# Patient Record
Sex: MALE | Race: AMERICAN INDIAN OR ALASKA NATIVE | HISPANIC OR LATINO | Employment: UNEMPLOYED | URBAN - METROPOLITAN AREA
[De-identification: names, ages, dates, MRNs, and addresses within clinical notes are randomized per-mention and may not be internally consistent; named-entity substitution may affect disease eponyms.]

---

## 2017-01-19 ENCOUNTER — ALLSCRIPTS OFFICE VISIT (OUTPATIENT)
Dept: OTHER | Facility: OTHER | Age: 52
End: 2017-01-19

## 2017-01-19 DIAGNOSIS — G25.81 RESTLESS LEGS SYNDROME: ICD-10-CM

## 2017-01-19 DIAGNOSIS — E78.5 HYPERLIPIDEMIA: ICD-10-CM

## 2017-01-19 DIAGNOSIS — R07.89 OTHER CHEST PAIN: ICD-10-CM

## 2017-01-19 DIAGNOSIS — M54.9 DORSALGIA: ICD-10-CM

## 2017-01-30 ENCOUNTER — HOSPITAL ENCOUNTER (OUTPATIENT)
Dept: RADIOLOGY | Facility: HOSPITAL | Age: 52
Discharge: HOME | End: 2017-01-30
Payer: COMMERCIAL

## 2017-01-30 ENCOUNTER — APPOINTMENT (OUTPATIENT)
Dept: LAB | Facility: HOSPITAL | Age: 52
End: 2017-01-30
Payer: COMMERCIAL

## 2017-01-30 DIAGNOSIS — G25.81 RESTLESS LEGS SYNDROME: ICD-10-CM

## 2017-01-30 DIAGNOSIS — E78.5 HYPERLIPIDEMIA: ICD-10-CM

## 2017-01-30 DIAGNOSIS — M54.9 DORSALGIA: ICD-10-CM

## 2017-01-30 DIAGNOSIS — R07.89 OTHER CHEST PAIN: ICD-10-CM

## 2017-01-30 LAB
ALBUMIN SERPL BCP-MCNC: 3.6 G/DL (ref 3.5–5)
ALP SERPL-CCNC: 78 U/L (ref 46–116)
ALT SERPL W P-5'-P-CCNC: 38 U/L (ref 12–78)
ANION GAP SERPL CALCULATED.3IONS-SCNC: 9 MMOL/L (ref 4–13)
AST SERPL W P-5'-P-CCNC: 20 U/L (ref 5–45)
BACTERIA UR QL AUTO: ABNORMAL /HPF
BASOPHILS # BLD AUTO: 0.05 THOUSANDS/ΜL (ref 0–0.1)
BASOPHILS NFR BLD AUTO: 1 % (ref 0–1)
BILIRUB SERPL-MCNC: 0.29 MG/DL (ref 0.2–1)
BILIRUB UR QL STRIP: NEGATIVE
BUN SERPL-MCNC: 14 MG/DL (ref 5–25)
CALCIUM SERPL-MCNC: 8.8 MG/DL (ref 8.3–10.1)
CHLORIDE SERPL-SCNC: 106 MMOL/L (ref 100–108)
CHOLEST SERPL-MCNC: 213 MG/DL (ref 50–200)
CLARITY UR: CLEAR
CO2 SERPL-SCNC: 27 MMOL/L (ref 21–32)
COLOR UR: YELLOW
CREAT SERPL-MCNC: 1.01 MG/DL (ref 0.6–1.3)
EOSINOPHIL # BLD AUTO: 0.2 THOUSAND/ΜL (ref 0–0.61)
EOSINOPHIL NFR BLD AUTO: 3 % (ref 0–6)
ERYTHROCYTE [DISTWIDTH] IN BLOOD BY AUTOMATED COUNT: 14.8 % (ref 11.6–15.1)
FOLATE SERPL-MCNC: 10.6 NG/ML (ref 3.1–17.5)
GFR SERPL CREATININE-BSD FRML MDRD: >60 ML/MIN/1.73SQ M
GLUCOSE SERPL-MCNC: 99 MG/DL (ref 65–140)
GLUCOSE UR STRIP-MCNC: NEGATIVE MG/DL
HCT VFR BLD AUTO: 44.2 % (ref 36.5–49.3)
HDLC SERPL-MCNC: 42 MG/DL (ref 40–60)
HGB BLD-MCNC: 15.1 G/DL (ref 12–17)
HGB UR QL STRIP.AUTO: ABNORMAL
KETONES UR STRIP-MCNC: NEGATIVE MG/DL
LDLC SERPL CALC-MCNC: 149 MG/DL (ref 0–100)
LEUKOCYTE ESTERASE UR QL STRIP: NEGATIVE
LYMPHOCYTES # BLD AUTO: 2.09 THOUSANDS/ΜL (ref 0.6–4.47)
LYMPHOCYTES NFR BLD AUTO: 29 % (ref 14–44)
MCH RBC QN AUTO: 31.5 PG (ref 26.8–34.3)
MCHC RBC AUTO-ENTMCNC: 34.2 G/DL (ref 31.4–37.4)
MCV RBC AUTO: 92 FL (ref 82–98)
MONOCYTES # BLD AUTO: 0.53 THOUSAND/ΜL (ref 0.17–1.22)
MONOCYTES NFR BLD AUTO: 7 % (ref 4–12)
NEUTROPHILS # BLD AUTO: 4.46 THOUSANDS/ΜL (ref 1.85–7.62)
NEUTS SEG NFR BLD AUTO: 60 % (ref 43–75)
NITRITE UR QL STRIP: NEGATIVE
NON-SQ EPI CELLS URNS QL MICRO: ABNORMAL /HPF
PH UR STRIP.AUTO: 5.5 [PH] (ref 4.5–8)
PLATELET # BLD AUTO: 202 THOUSANDS/UL (ref 149–390)
PMV BLD AUTO: 11 FL (ref 8.9–12.7)
POTASSIUM SERPL-SCNC: 4.1 MMOL/L (ref 3.5–5.3)
PROT SERPL-MCNC: 7.3 G/DL (ref 6.4–8.2)
PROT UR STRIP-MCNC: NEGATIVE MG/DL
RBC # BLD AUTO: 4.79 MILLION/UL (ref 3.88–5.62)
RBC #/AREA URNS AUTO: ABNORMAL /HPF
SODIUM SERPL-SCNC: 142 MMOL/L (ref 136–145)
SP GR UR STRIP.AUTO: 1.02 (ref 1–1.03)
TRIGL SERPL-MCNC: 108 MG/DL
TSH SERPL DL<=0.05 MIU/L-ACNC: 2.96 UIU/ML (ref 0.36–3.74)
UROBILINOGEN UR QL STRIP.AUTO: 0.2 E.U./DL
VIT B12 SERPL-MCNC: 205 PG/ML (ref 100–900)
WBC # BLD AUTO: 7.33 THOUSAND/UL (ref 4.31–10.16)
WBC #/AREA URNS AUTO: ABNORMAL /HPF

## 2017-01-30 PROCEDURE — 36415 COLL VENOUS BLD VENIPUNCTURE: CPT

## 2017-01-30 PROCEDURE — 82607 VITAMIN B-12: CPT

## 2017-01-30 PROCEDURE — 72110 X-RAY EXAM L-2 SPINE 4/>VWS: CPT

## 2017-01-30 PROCEDURE — 84443 ASSAY THYROID STIM HORMONE: CPT

## 2017-01-30 PROCEDURE — 82746 ASSAY OF FOLIC ACID SERUM: CPT

## 2017-01-30 PROCEDURE — 80053 COMPREHEN METABOLIC PANEL: CPT

## 2017-01-30 PROCEDURE — 87086 URINE CULTURE/COLONY COUNT: CPT

## 2017-01-30 PROCEDURE — 80061 LIPID PANEL: CPT

## 2017-01-30 PROCEDURE — 81001 URINALYSIS AUTO W/SCOPE: CPT

## 2017-01-30 PROCEDURE — 85025 COMPLETE CBC W/AUTO DIFF WBC: CPT

## 2017-01-31 ENCOUNTER — GENERIC CONVERSION - ENCOUNTER (OUTPATIENT)
Dept: OTHER | Facility: OTHER | Age: 52
End: 2017-01-31

## 2017-01-31 LAB — BACTERIA UR CULT: NORMAL

## 2017-02-01 ENCOUNTER — GENERIC CONVERSION - ENCOUNTER (OUTPATIENT)
Dept: OTHER | Facility: OTHER | Age: 52
End: 2017-02-01

## 2017-02-14 ENCOUNTER — HOSPITAL ENCOUNTER (OUTPATIENT)
Dept: NON INVASIVE DIAGNOSTICS | Facility: HOSPITAL | Age: 52
Discharge: HOME | End: 2017-02-14
Payer: COMMERCIAL

## 2017-02-14 DIAGNOSIS — R07.89 OTHER CHEST PAIN: ICD-10-CM

## 2017-02-14 PROCEDURE — 93017 CV STRESS TEST TRACING ONLY: CPT

## 2017-02-21 ENCOUNTER — GENERIC CONVERSION - ENCOUNTER (OUTPATIENT)
Dept: OTHER | Facility: OTHER | Age: 52
End: 2017-02-21

## 2017-05-11 ENCOUNTER — ALLSCRIPTS OFFICE VISIT (OUTPATIENT)
Dept: OTHER | Facility: OTHER | Age: 52
End: 2017-05-11

## 2017-05-11 DIAGNOSIS — N52.9 MALE ERECTILE DYSFUNCTION: ICD-10-CM

## 2018-01-10 NOTE — RESULT NOTES
Verified Results  (1) URINALYSIS w URINE C/S REFLEX (will reflex a microscopy if leukocytes, occult blood, or nitrites are not within normal limits) 37HBQ4356 10:08AM Aristides Moya Order Number: [id number]     Test Name Result Flag Reference   COLOR Yellow     CLARITY Clear     PH UA 5 5  4 5-8 0   LEUKOCYTE ESTERASE UA Negative  Negative   NITRITE UA Negative  Negative   PROTEIN UA Negative mg/dl  Negative   GLUCOSE UA Negative mg/dl  Negative   KETONES UA Negative mg/dl  Negative   UROBILINOGEN UA 0 2 E U /dl  0 2, 1 0 E U /dl   BILIRUBIN UA Negative  Negative   BLOOD UA Small A Negative, Trace-Intact   SPECIFIC GRAVITY UA 1 025  1 003-1 030   BACTERIA Occasional /hpf  None Seen, Occasional   EPITHELIAL CELLS Occasional /hpf  None Seen, Occasional   RBC UA 0-1 /hpf A None Seen   WBC UA 0-1 /hpf A None Seen   CLINICAL REPORT (Report)     Test:        Urine culture  Specimen Type:   Urine  Specimen Date:   1/30/2017 10:08 AM  Result Date:    1/31/2017 9:33 AM  Result Status:   Final result  Resulting Lab:    [facility]            Tel: [phone]      CULTURE                                       ------------------                                   No Growth <1000 cfu/mL

## 2018-01-12 VITALS
TEMPERATURE: 97 F | WEIGHT: 183 LBS | SYSTOLIC BLOOD PRESSURE: 108 MMHG | HEIGHT: 68 IN | HEART RATE: 90 BPM | OXYGEN SATURATION: 98 % | RESPIRATION RATE: 20 BRPM | DIASTOLIC BLOOD PRESSURE: 60 MMHG | BODY MASS INDEX: 27.74 KG/M2

## 2018-01-12 NOTE — RESULT NOTES
Verified Results  (1) CBC/PLT/DIFF 52XSF7791 10:08AM Lizabeth Santana Order Number: [id number]     Test Name Result Flag Reference   WBC COUNT 7 33 Thousand/uL  4 31-10 16   RBC COUNT 4 79 Million/uL  3 88-5 62   HEMOGLOBIN 15 1 g/dL  12 0-17 0   HEMATOCRIT 44 2 %  36 5-49 3   MCV 92 fL  82-98   MCH 31 5 pg  26 8-34 3   MCHC 34 2 g/dL  31 4-37 4   RDW 14 8 %  11 6-15 1   MPV 11 0 fL  8 9-12 7   PLATELET COUNT 590 Thousands/uL  149-390   NEUTROPHILS RELATIVE PERCENT 60 %  43-75   LYMPHOCYTES RELATIVE PERCENT 29 %  14-44   MONOCYTES RELATIVE PERCENT 7 %  4-12   EOSINOPHILS RELATIVE PERCENT 3 %  0-6   BASOPHILS RELATIVE PERCENT 1 %  0-1   NEUTROPHILS ABSOLUTE COUNT 4 46 Thousands/?L  1 85-7 62   LYMPHOCYTES ABSOLUTE COUNT 2 09 Thousands/?L  0 60-4 47   MONOCYTES ABSOLUTE COUNT 0 53 Thousand/?L  0 17-1 22   EOSINOPHILS ABSOLUTE COUNT 0 20 Thousand/?L  0 00-0 61   BASOPHILS ABSOLUTE COUNT 0 05 Thousands/?L  0 00-0 10   - Patient Instructions: This bloodwork is non-fasting  Please drink two glasses of water morning of bloodwork  - Patient Instructions: This bloodwork is non-fasting  Please drink two glasses of water morning of bloodwork  (1) COMPREHENSIVE METABOLIC PANEL 59OFO2641 06:96XI Lizabeth Santana Order Number: [id number]     Test Name Result Flag Reference   GLUCOSE,RANDM 99 mg/dL     If the patient is fasting, the ADA then defines impaired fasting glucose as > 100 mg/dL and diabetes as > or equal to 123 mg/dL     SODIUM 142 mmol/L  136-145   POTASSIUM 4 1 mmol/L  3 5-5 3   CHLORIDE 106 mmol/L  100-108   CARBON DIOXIDE 27 mmol/L  21-32   ANION GAP (CALC) 9 mmol/L  4-13   BLOOD UREA NITROGEN 14 mg/dL  5-25   CREATININE 1 01 mg/dL  0 60-1 30   Standardized to IDMS reference method   CALCIUM 8 8 mg/dL  8 3-10 1   BILI, TOTAL 0 29 mg/dL  0 20-1 00   ALK PHOSPHATAS 78 U/L     ALT (SGPT) 38 U/L  12-78   AST(SGOT) 20 U/L  5-45   ALBUMIN 3 6 g/dL  3 5-5 0   TOTAL PROTEIN 7 3 g/dL  6 4-8 2   eGFR Non-African American      >60 0 ml/min/1 73sq m   - Patient Instructions: This is a fasting blood test  Water,black tea or black  coffee only after 9:00pm the night before test Drink 2 glasses of water the morning of test   National Kidney Disease Education Program recommendations are as follows:  GFR calculation is accurate only with a steady state creatinine  Chronic Kidney disease less than 60 ml/min/1 73 sq  meters  Kidney failure less than 15 ml/min/1 73 sq  meters  (1) LIPID PANEL, FASTING 30Jan2017 10:08AM Catrachito Aguilar Order Number: [id number]     Test Name Result Flag Reference   CHOLESTEROL 213 mg/dL H    HDL,DIRECT 42 mg/dL  40-60   Specimen collection should occur prior to Metamizole administration due to the potential for falsely depressed results  LDL CHOLESTEROL CALCULATED 149 mg/dL H 0-100   - Patient Instructions: This is a fasting blood test  Water,black tea or black  coffee only after 9:00pm the night before test   Drink 2 glasses of water the morning of test     - Patient Instructions: This is a fasting blood test  Water,black tea or black  coffee only after 9:00pm the night before test Drink 2 glasses of water the morning of test   Triglyceride:         Normal              <150 mg/dl       Borderline High    150-199 mg/dl       High               200-499 mg/dl       Very High          >499 mg/dl  Cholesterol:         Desirable        <200 mg/dl      Borderline High  200-239 mg/dl      High             >239 mg/dl  HDL Cholesterol:        High    >59 mg/dL      Low     <41 mg/dL  LDL CALCULATED:    This screening LDL is a calculated result  It does not have the accuracy of the Direct Measured LDL in the monitoring of patients with hyperlipidemia and/or statin therapy  Direct Measure LDL (JHN650) must be ordered separately in these patients     TRIGLYCERIDES 108 mg/dL  <=150   Specimen collection should occur prior to N-Acetylcysteine or Metamizole administration due to the potential for falsely depressed results  (1) TSH WITH FT4 REFLEX 30Jan2017 10:08 [employer] Order Number: [id number]     Test Name Result Flag Reference   TSH 2 963 uIU/mL  0 358-3 740   - Patient Instructions: This is a fasting blood test  Water,black tea or black  coffee only after 9:00pm the night before test Drink 2 glasses of water the morning of test   Patients undergoing fluorescein dye angiography may retain small amounts of fluorescein in the body for 48-72 hours post procedure  Samples containing fluorescein can produce falsely depressed TSH values  If the patient had this procedure,a specimen should be resubmitted post fluorescein clearance       (1) URINALYSIS w URINE C/S REFLEX (will reflex a microscopy if leukocytes, occult blood, or nitrites are not within normal limits) [id number] 10:08 [employer] Order Number: [id number]     Test Name Result Flag Reference   COLOR Yellow     CLARITY Clear     PH UA 5 5  4 5-8 0   LEUKOCYTE ESTERASE UA Negative  Negative   NITRITE UA Negative  Negative   PROTEIN UA Negative mg/dl  Negative   GLUCOSE UA Negative mg/dl  Negative   KETONES UA Negative mg/dl  Negative   UROBILINOGEN UA 0 2 E U /dl  0 2, 1 0 E U /dl   BILIRUBIN UA Negative  Negative   BLOOD UA Small A Negative, Trace-Intact   SPECIFIC GRAVITY UA 1 025  1 003-1 030   BACTERIA Occasional /hpf  None Seen, Occasional   EPITHELIAL CELLS Occasional /hpf  None Seen, Occasional   RBC UA 0-1 /hpf A None Seen   WBC UA 0-1 /hpf A None Seen     (1) VITAMIN B12 30Jan2017 10:08 [employer] Order Number: [id number]     Test Name Result Flag Reference   VITAMIN B12 205 pg/mL  100-900     (1) FOLATE 19OMI3417 10:08 [employer] Order Number: [id number]     Test Name Result Flag Reference   FOLATE 10 6 ng/mL  3 1-17 5       Plan  Hyperlipidemia    · Atorvastatin Calcium 20 MG Oral Tablet; TAKE 1 TABLET DAILY AS DIRECTED

## 2018-01-12 NOTE — RESULT NOTES
Verified Results  STRESS TEST ONLY, EXERCISE 40SZY6418 08:43AM Wilbur Carlson Order Number: [id number]    - Patient Instructions: To schedule this appointment, please contact Central Scheduling at [phone]  Test Name Result Flag Reference   STRESS TEST ONLY, EXERCISE (Report)     Bony [address]fall [address]  [facility], [address]   [phone]     Stress Electrocardiography during Exercise     Name: Latasha Loja   MR #: [id number]   Account #: [de-identified]   Study date: 2017   : 1965   Age: 49 years   Gender: Male   Height: 68 in   Weight: 183 lb   BSA: 1 97 m squared     Allergies: NO KNOWN ALLERGIES     Diagnosis: R07 9 - Chest pain, unspecified     Primary Physician: Margie Irving PA-C   Referring Physician: Margie Irving PA-C   RN: Vasile Charles RN BSN   Technician: Ranjan Rich   GROUP: [facility]   Interpreting Physician: Ashley Thomason MD PhD   Report Prepared By[de-identified] Ranjan Rich     CLINICAL QUESTION: Detection of coronary artery disease  HISTORY: back pain The patient is a 49year old  male  Chest pain status: chest pain  Other symptoms: dyspnea  Coronary artery disease risk factors: dyslipidemia and family history of premature coronary artery disease  Cardiovascular history: none significant  Medications: a lipid lowering agent  Previous test results: abnormal ECG  PHYSICAL EXAM: Baseline physical exam screening: normal      REST ECG: Normal sinus rhythm w/ RBBB     PROCEDURE: Treadmill exercise testing was performed, using the Hortensia protocol  Stress electrocardiographic evaluation was performed       HORTENSIA PROTOCOL:   HR bpm SBP mmHg DBP mmHg Symptoms   Baseline 94 114 26 none   Stage 1 112 142 76 none   Stage 2 129 149 45 none   Stage 3 144 183 56 none   Stage 4 151 180 70 --   Recovery 1 123 161 68 subsiding   Recovery 2 106 116 68 none     STRESS SUMMARY: STOPPED DUE TO BACK PAIN Duration of exercise was 9 min and 30 sec  The patient exercised to protocol stage 4  Maximal work rate was 10 9 METs  Maximal heart rate during stress was 153 bpm ( 90 % of maximal predicted heart   rate)  The rate-pressure product for the peak heart rate and blood pressure was 36578  There was no chest pain during stress  The stress test was terminated due to achievement of target heart rate and fatigue  The stress ECG was equivocal   for ischemia  Arrhythmia during stress: isolated premature ventricular beats  The specificity of this test was limited by resting ECG abnormalities  SUMMARY:   - Stress results: Duration of exercise was 9 min and 30 sec  Target heart rate was achieved  There was no chest pain during stress  - ECG conclusions: The stress ECG was equivocal for ischemia  The specificity of this test was limited by resting ECG abnormalities  IMPRESSIONS: RESTING SAT 98% PEAK STRESS SAT 96% Equivocal study after maximal exercise with reproduction of symptoms pf BARRIENTOS but not chest tightness       Prepared and signed by     Martita Freitas MD PhD   Signed 02/14/2017 18:17:22

## 2018-01-13 VITALS
SYSTOLIC BLOOD PRESSURE: 126 MMHG | RESPIRATION RATE: 20 BRPM | WEIGHT: 180.13 LBS | OXYGEN SATURATION: 99 % | TEMPERATURE: 97.1 F | DIASTOLIC BLOOD PRESSURE: 70 MMHG | HEIGHT: 68 IN | BODY MASS INDEX: 27.3 KG/M2 | HEART RATE: 93 BPM

## 2018-01-16 NOTE — PROGRESS NOTES
Assessment    1  Erectile dysfunction (607 84) (N52 9)   2  Encounter for preventive health examination (V70 0) (Z00 00)   3  's permit PE (physical examination) (V70 3) (Z02 4)   4  Current every day smoker (305 1) (F17 200)    Plan  Colon cancer screening    · 2 - Shelby Angeles MD (Gastroenterology) Co-Management  *  Status: Hold For -  Scheduling  Requested for: 18FQB4780  Care Summary provided  : Yes   · COLONOSCOPY; Status:Active; Requested for:11May2017;   Erectile dysfunction    · (1) TESTOSTERONE, FREE (DIRECT) AND TOTAL; Status:Active; Requested  for:11May2017; Health Maintenance    · *VB-Depression Screening; Status:Complete;   Done: 39TYA9058 08:49AM  SocHx: Current every day smoker    · Begin a limited exercise program ; Status:Complete;   Done: 71PVL4655   · Decreasing the stress in your life may help your condition improve ; Status:Complete;    Done: 42SEV8428   · Limit your use of alcohol to 2 drinks or cans of beer a day ; Status:Complete;   Done:  82ZHG4348   · Regular aerobic exercise can help reduce stress ; Status:Complete;   Done: 61PMA0660   · There are many exercise options for seniors ; Status:Complete;   Done: 37QCS2189   · We recommend you quit smoking  Time spent counseling today was greater than 3  minutes ; Status:Complete;   Done: 88JED5674   · You need to quit smoking ; Status:Complete;   Done: 89AEZ7360   · You need to stop smoking  Though it is not easy, more than half of all adult smokers  have quit  We encourage you to write down all the reasons you should quit smoking and  set a quit date for yourself  Ask us how we can help  You may also call  3800-QUIT-NOW for free resources and assistance ; Status:Complete;   Done:  81MIF9915    Discussion/Summary  Impression: health maintenance visit  Currently, he eats an adequate diet and has an inadequate exercise regimen   Prostate cancer screening: the risks and benefits of prostate cancer screening were discussed and prostate cancer screening is current  Testicular cancer screening: the risks and benefits of testicular cancer screening were discussed and testicular cancer screening is current  Colorectal cancer screening: colonoscopy has been ordered  The immunizations are up to date  He was advised to be evaluated by an optometrist and a dentist  Advice and education were given regarding nutrition, aerobic exercise, tobacco cessation and seat belt use  Discussed diet and exercise  This will help lower cholesterol  May also help with ED symptoms  Smoking cessation will also help  Patient will try to cut back on his own at this time  Does not want assistance  Will get testosterone lab work  Discussed 's safety  Filled out physical form  Follow up in 1 year for physical    Possible side effects of new medications were reviewed with the patient/guardian today  The treatment plan was reviewed with the patient/guardian  The patient/guardian understands and agrees with the treatment plan     Self Referrals: No      Chief Complaint   License Physical      History of Present Illness  HM, Adult Male: The patient is being seen for a health maintenance evaluation  The last health maintenance visit was 1 year(s) ago  General Health: The patient's health since the last visit is described as good  He does not have regular dental visits  He denies vision problems  Immunizations status: up to date  Lifestyle:  He consumes a diverse and healthy diet  He does not exercise regularly  He uses tobacco  He denies alcohol use  He denies drug use  Reproductive health:  the patient is sexually active  birth control is not being practiced  He complains of erectile dysfunction  He is interested in treatment for erectile dysfunction  Screening:   metabolic screening reviewed and current  HPI: 46 y/o M presenting for yearly physical and 's permit physical form   Patient states that muscle relaxer has been helping with the RLS  Patient does have concerns with ED  Patient has a history of drug and ETOH abuse, been clean for 12 years, is a current smoker and has elevated cholesterol  Patient has not had colonoscopy completed and would like to have one done  Review of Systems    Constitutional: No fever or chills, feels well, no tiredness, no recent weight gain or weight loss  Eyes: No complaints of eye pain, no red eyes, no discharge from eyes, no itchy eyes  ENT: no complaints of earache, no hearing loss, no nosebleeds, no nasal discharge, no sore throat, no hoarseness  Cardiovascular: No complaints of slow heart rate, no fast heart rate, no chest pain, no palpitations, no leg claudication, no lower extremity  Respiratory: No complaints of shortness of breath, no wheezing, no cough, no SOB on exertion, no orthopnea or PND  Gastrointestinal: No complaints of abdominal pain, no constipation, no nausea or vomiting, no diarrhea or bloody stools  Genitourinary: No complaints of dysuria, no incontinence, no hesitancy, no nocturia, no genital lesion, no testicular pain  Musculoskeletal: No complaints of arthralgia, no myalgias, no joint swelling or stiffness, no limb pain or swelling  Integumentary: No complaints of skin rash or skin lesions, no itching, no skin wound, no dry skin  Neurological: No compliants of headache, no confusion, no convulsions, no numbness or tingling, no dizziness or fainting, no limb weakness, no difficulty walking  Psychiatric: Is not suicidal, no sleep disturbances, no anxiety or depression, no change in personality, no emotional problems  Endocrine: erectile dysfunction  Hematologic/Lymphatic: No complaints of swollen glands, no swollen glands in the neck, does not bleed easily, no easy bruising  ROS reviewed  Active Problems    1  Back pain (724 5) (M54 9)   2  Chest tightness (786 59) (R07 89)   3  Colon cancer screening (V76 51) (Z12 11)   4   Hyperlipidemia (272 4) (E78 5)   5  Restless leg syndrome (333 94) (G25 81)    Past Medical History    · History of drug abuse (305 93) (Z87 898)   · Denied: History of mental disorder    Family History  Mother    · Family history of malignant neoplasm of stomach (V16 0) (Z80 0)  Father    · Family history of alcoholism (V17 0) (Z81 1)   · Family history of Hypertelorism  Paternal [de-identified] Sister    · Family history of Diabetes mellitus of other type with proliferative retinopathy   · History of heart surgery  Family History    · Family history of alcoholism (V17 0) (Z81 1)   · Denied: No family history of mental disorder   · Denied: Family history of Non-abuse drug dependence    Social History    · Current every day smoker (305 1) (F17 200)   · No Taoism beliefs   · Primary language is Wolof   · Social alcohol use (Z78 9)    Current Meds   1  Atorvastatin Calcium 20 MG Oral Tablet; TAKE 1 TABLET DAILY AS DIRECTED; Therapy: 96VRB3763 to (Evaluate:01Cia5419)  Requested for: 40ADB9131; Last   Rx:31Jan2017 Ordered   2  Gabapentin 300 MG Oral Capsule; TAKE 1 CAPSULE AT BEDTIME; Therapy: 78BXQ0178 to (Evaluate:00Oed1994)  Requested for: 18OAE3436; Last   Rx:19Jan2017 Ordered   3  Gemfibrozil 600 MG Oral Tablet; TAKE 1 TABLET DAILY; Therapy: 79HKI2923 to (Evaluate:29Wta9303)  Requested for: 27GDA2986; Last   Rx:08Pwv7171 Ordered   4  Methocarbamol 500 MG Oral Tablet; TAKE 1 TABLET 3 TIMES DAILY; Therapy: 00UKE6339 to (Evaluate:85Zkk8124)  Requested for: 91ZQD8632; Last   Rx:19Jan2017 Ordered   5  Naproxen 500 MG Oral Tablet; take 1 tablet every 12 hours with food as needed; Therapy: 76WUK1341 to (Evaluate:80Riq5525)  Requested for: 32OYW1458; Last   Rx:19Jan2017 Ordered    Allergies    1   No Known Drug Allergies    Vitals   Recorded: 08ITN2651 08:44AM   Temperature 97 1 F, Tympanic   Heart Rate 93   Respiration 20   Systolic 596   Diastolic 70   Height 5 ft 8 in   Weight 180 lb 2 oz   BMI Calculated 27 39   BSA Calculated 1 95   O2 Saturation 99     Physical Exam    Constitutional   General appearance: No acute distress, well appearing and well nourished  Head and Face   Head and face: Normal     Palpation of the face and sinuses: No sinus tenderness  Eyes   Conjunctiva and lids: No erythema, swelling or discharge  Pupils and irises: Equal, round, reactive to light  Ears, Nose, Mouth, and Throat   External inspection of ears and nose: Normal     Otoscopic examination: Tympanic membranes translucent with normal light reflex  Canals patent without erythema  Hearing: Normal     Nasal mucosa, septum, and turbinates: Normal without edema or erythema  Lips, teeth, and gums: Normal, good dentition  Oropharynx: Normal with no erythema, edema, exudate or lesions  Neck   Neck: Supple, symmetric, trachea midline, no masses  Thyroid: Normal, no thyromegaly  Pulmonary   Respiratory effort: No increased work of breathing or signs of respiratory distress  Auscultation of lungs: Clear to auscultation  Cardiovascular   Palpation of heart: Normal PMI, no thrills  Auscultation of heart: Normal rate and rhythm, normal S1 and S2, no murmurs  Carotid pulses: 2+ bilaterally  Peripheral vascular exam: Normal     Examination of extremities for edema and/or varicosities: Normal     Abdomen   Abdomen: Non-tender, no masses  Liver and spleen: No hepatomegaly or splenomegaly  Lymphatic   Palpation of lymph nodes in neck: No lymphadenopathy  Musculoskeletal   Gait and station: Normal     Inspection/palpation of digits and nails: Normal without clubbing or cyanosis  Inspection/palpation of joints, bones, and muscles: Normal     Range of motion: Normal     Stability: Normal     Muscle strength/tone: Normal     Skin   Skin and subcutaneous tissue: Normal without rashes or lesions  Palpation of skin and subcutaneous tissue: Normal turgor  Neurologic   Cranial nerves: Cranial nerves 2-12 intact      Cortical function: Normal mental status  Reflexes: 2+ and symmetric  Sensation: No sensory loss  Coordination: Normal finger to nose and heel to shin  Psychiatric   Judgment and insight: Normal     Orientation to person, place and time: Normal     Mood and affect: Normal        Results/Data  *VB-Depression Screening 63OFT3492 08:49AM Dot Perera     Test Name Result Flag Reference   Depression Scale Result      Depression Screen - Negative For Symptoms     PHQ-2 Adult Depression Screening 12YYM2519 08:49AM User, s     Test Name Result Flag Reference   PHQ-2 Adult Depression Score 0     Over the last two weeks, how often have you been bothered by any of the following problems?   Little interest or pleasure in doing things: Not at all - 0  Feeling down, depressed, or hopeless: Not at all - 0   PHQ-2 Adult Depression Screening Negative         Signatures   Electronically signed by : DYLAN Francisco; May 11 2017 11:30AM EST                       (Author)    Electronically signed by : FLORIDA Franklin ; May 11 2017  1:30PM EST

## 2018-01-16 NOTE — RESULT NOTES
Verified Results  * XR SPINE LUMBAR MINIMUM 4 VIEWS NON INJURY 30Jan2017 09:47AM Madie Medeiros Order Number: [id number]     Test Name Result Flag Reference   XR SPINE LUMBAR MINIMUM 4 VIEWS (Report)     LUMBAR SPINE     INDICATION: Low back pain for 3 to 4 days, worse with twisting the torso  COMPARISON: None     VIEWS: AP, lateral, bilateral oblique and coned down projections; 6 images     FINDINGS:     Alignment is normal  There is subtle rightward curvature of lumbar spine with apex at L4  Curvature is only about 2 degrees  There is no radiographic evidence of acute fracture or destructive osseous lesion  No significant lumbar degenerative change noted  Visualized soft tissues appear unremarkable         IMPRESSION:     Unremarkable study       Workstation performed: [id number]     Signed by:   Ga Patton MD   1/31/17

## 2018-01-24 ENCOUNTER — APPOINTMENT (EMERGENCY)
Dept: CT IMAGING | Facility: HOSPITAL | Age: 53
End: 2018-01-24
Payer: COMMERCIAL

## 2018-01-24 ENCOUNTER — APPOINTMENT (EMERGENCY)
Dept: RADIOLOGY | Facility: HOSPITAL | Age: 53
End: 2018-01-24
Payer: COMMERCIAL

## 2018-01-24 ENCOUNTER — HOSPITAL ENCOUNTER (EMERGENCY)
Facility: HOSPITAL | Age: 53
Discharge: HOME | End: 2018-01-24
Attending: EMERGENCY MEDICINE | Admitting: EMERGENCY MEDICINE
Payer: COMMERCIAL

## 2018-01-24 VITALS
TEMPERATURE: 98 F | WEIGHT: 175 LBS | OXYGEN SATURATION: 98 % | DIASTOLIC BLOOD PRESSURE: 58 MMHG | SYSTOLIC BLOOD PRESSURE: 123 MMHG | BODY MASS INDEX: 26.61 KG/M2 | RESPIRATION RATE: 16 BRPM | HEART RATE: 61 BPM

## 2018-01-24 DIAGNOSIS — R51.9 HEADACHE: Primary | ICD-10-CM

## 2018-01-24 DIAGNOSIS — R06.00 DYSPNEA: ICD-10-CM

## 2018-01-24 LAB
ANION GAP SERPL CALCULATED.3IONS-SCNC: 10 MMOL/L (ref 4–13)
ATRIAL RATE: 66 BPM
BASOPHILS # BLD AUTO: 0.04 THOUSANDS/ΜL (ref 0–0.1)
BASOPHILS NFR BLD AUTO: 1 % (ref 0–1)
BUN SERPL-MCNC: 15 MG/DL (ref 5–25)
CALCIUM SERPL-MCNC: 8 MG/DL (ref 8.3–10.1)
CHLORIDE SERPL-SCNC: 108 MMOL/L (ref 100–108)
CO2 SERPL-SCNC: 25 MMOL/L (ref 21–32)
CREAT SERPL-MCNC: 1.15 MG/DL (ref 0.6–1.3)
EOSINOPHIL # BLD AUTO: 0.22 THOUSAND/ΜL (ref 0–0.61)
EOSINOPHIL NFR BLD AUTO: 3 % (ref 0–6)
ERYTHROCYTE [DISTWIDTH] IN BLOOD BY AUTOMATED COUNT: 14.5 % (ref 11.6–15.1)
GFR SERPL CREATININE-BSD FRML MDRD: 73 ML/MIN/1.73SQ M
GLUCOSE SERPL-MCNC: 117 MG/DL (ref 65–140)
HCT VFR BLD AUTO: 44.7 % (ref 36.5–49.3)
HGB BLD-MCNC: 15.1 G/DL (ref 12–17)
LYMPHOCYTES # BLD AUTO: 1.74 THOUSANDS/ΜL (ref 0.6–4.47)
LYMPHOCYTES NFR BLD AUTO: 27 % (ref 14–44)
MCH RBC QN AUTO: 32.8 PG (ref 26.8–34.3)
MCHC RBC AUTO-ENTMCNC: 33.8 G/DL (ref 31.4–37.4)
MCV RBC AUTO: 97 FL (ref 82–98)
MONOCYTES # BLD AUTO: 0.37 THOUSAND/ΜL (ref 0.17–1.22)
MONOCYTES NFR BLD AUTO: 6 % (ref 4–12)
NEUTROPHILS # BLD AUTO: 4.18 THOUSANDS/ΜL (ref 1.85–7.62)
NEUTS SEG NFR BLD AUTO: 64 % (ref 43–75)
NT-PROBNP SERPL-MCNC: 9 PG/ML
P AXIS: 46 DEGREES
PLATELET # BLD AUTO: 174 THOUSANDS/UL (ref 149–390)
PMV BLD AUTO: 11.2 FL (ref 8.9–12.7)
POTASSIUM SERPL-SCNC: 4 MMOL/L (ref 3.5–5.3)
PR INTERVAL: 152 MS
QRS AXIS: -80 DEGREES
QRSD INTERVAL: 156 MS
QT INTERVAL: 420 MS
QTC INTERVAL: 440 MS
RBC # BLD AUTO: 4.6 MILLION/UL (ref 3.88–5.62)
SODIUM SERPL-SCNC: 143 MMOL/L (ref 136–145)
T WAVE AXIS: 27 DEGREES
TROPONIN I SERPL-MCNC: <0.02 NG/ML
VENTRICULAR RATE: 66 BPM
WBC # BLD AUTO: 6.55 THOUSAND/UL (ref 4.31–10.16)

## 2018-01-24 PROCEDURE — 99284 EMERGENCY DEPT VISIT MOD MDM: CPT

## 2018-01-24 PROCEDURE — 71046 X-RAY EXAM CHEST 2 VIEWS: CPT

## 2018-01-24 PROCEDURE — 84484 ASSAY OF TROPONIN QUANT: CPT | Performed by: EMERGENCY MEDICINE

## 2018-01-24 PROCEDURE — 93010 ELECTROCARDIOGRAM REPORT: CPT | Performed by: INTERNAL MEDICINE

## 2018-01-24 PROCEDURE — 36415 COLL VENOUS BLD VENIPUNCTURE: CPT | Performed by: EMERGENCY MEDICINE

## 2018-01-24 PROCEDURE — 70450 CT HEAD/BRAIN W/O DYE: CPT

## 2018-01-24 PROCEDURE — 85025 COMPLETE CBC W/AUTO DIFF WBC: CPT | Performed by: EMERGENCY MEDICINE

## 2018-01-24 PROCEDURE — 80048 BASIC METABOLIC PNL TOTAL CA: CPT | Performed by: EMERGENCY MEDICINE

## 2018-01-24 PROCEDURE — 83880 ASSAY OF NATRIURETIC PEPTIDE: CPT | Performed by: EMERGENCY MEDICINE

## 2018-01-24 PROCEDURE — 93005 ELECTROCARDIOGRAM TRACING: CPT

## 2018-01-24 RX ORDER — ACETAMINOPHEN 325 MG/1
650 TABLET ORAL ONCE
Status: COMPLETED | OUTPATIENT
Start: 2018-01-24 | End: 2018-01-24

## 2018-01-24 RX ADMIN — ACETAMINOPHEN 650 MG: 325 TABLET, FILM COATED ORAL at 09:52

## 2018-01-24 NOTE — DISCHARGE INSTRUCTIONS
Dolor de traci karen   LO QUE NECESITA SABER:   El dolor de [place] karen es un dolor o molestia que comienza de reperosina y LOC rápidamente  Usted puede tener un dolor de traci karen sólo cuando siente estrés o come ciertos alimentos  Otro tipo dolor de traci karen puede producirse todos los días y a veces varias veces al día  INSTRUCCIONES SOBRE EL [facility]ITAL[facility]:   Regrese a la paul de emergencias si:   · Usted tiene dolor intenso  · Usted tiene entumecimiento en un lado de sheldon rishi o cuerpo  · Usted tiene un dolor de traic que ocurre después de un golpe en la traci, kwesi caída u otro trauma  · Tiene dolor de [place], está olvidadizo o confundido o tiene dificultad para hablar  · Tiene dolor de [place], rigidez en el yulia y [place]  Pregúntele a sheldon Reyne Rave vitaminas y minerales son adecuados para usted  · Usted tiene un dolor de traci dee y está vomitando  · Usted tiene dolor de traci todos los días y no se [place] aun después de tratarlo  · Jailyn anum de [place] u ocurren nuevos síntomas cuando tiene dolor de [place]  · Usted tiene preguntas o inquietudes acerca de sheldon condición o cuidado  Medicamentos:  Es posible que usted necesite alguno de los siguientes:  · Un medicamento con receta para el dolor  podrían ser Annita Mariela  El medicamento que recomienda sheldon médico dependerá del tipo de dolor de traci que tenga  Usted necesitará sandie medicamentos para el dolor de traci según las indicaciones para evitar un problema llamado dolor de traci de rebote  Estos anum de [place] ocurren con el uso regular de analgésicos para los trastornos de dolor de [place]  · AINEs (Analgésicos antiinflamatorios no esteroides) yisel el ibuprofeno, ayudan a disminuir la inflamación, el dolor y la [place]  Lissa medicamento esta disponible con o sin kwesi receta médica  Los AINEs pueden causar sangrado estomacal o problemas renales en ciertas personas   Si usted daylin un medicamento anticoagulante, siempre pregúntele a sheldon médico si los FABRICIO son seguros para usted  Siempre lu la etiqueta de martínez medicamento y [place] instrucciones  · El acetaminofén  [place] el dolor y baja la fiebre  Está disponible sin receta médica  Pregunte la cantidad y la frecuencia con que debe tomarlos  [address]  Lu las etiquetas de todos los demás medicamentos que esté usando para saber si también contienen acetaminofén, o pregunte a sheldon médico o farmacéutico  El acetaminofén puede causar daño en el hígado cuando no se daylin de forma correcta  No use más de 3 gramos (3,000 miligramos) en total de acetaminofeno en un día  · Antidepresivos  se pueden administrar para algunos tipos de anum de [place]  · [place] fiorella medicamentos yisel se le haya indicado  Consulte con sheldon médico si usted jorje que sheldon medicamento no le está ayudando o si presenta efectos secundarios  Infórmele si es alérgico a cualquier medicamento  Mantenga kwesi lista actualizada de los [place], las vitaminas y los productos herbales que daylin  Incluya los siguientes datos de los medicamentos: cantidad, frecuencia y motivo de administración  Traiga con usted la lista o los envases de la píldoras a fiorella citas de seguimiento  Lleve la lista de los medicamentos con usted en flakito de kwesi emergencia  El manejo de sheldon síntomas:   · Aplique hielo o calor  en la troy donde sheldon hijo siente el dolor de traci  Utilice un paquete (compresa) de hielo o calor  Para un paquete de hielo, también puede colocar hielo molido en kwesi bolsa plástica  Cubra el paquete de hielo o la bolsa con kwesi toalla pequeña antes de aplicarla en la piel  Tanto el hielo yisel el calor ayudan a reducir el dolor, y el calor también contribuye a reducir los C H  [employer]  Aplique calor lora 20 a 30 minutos cada 2 horas  Aplique hielo lora 15 a 20 minutos cada hora  Aplique calor o hielo lora el tiempo y la cantidad de días que se le indique   Usted puede alternar el calor y el hielo  · Relaje fiorella músculos  Acuéstese en kwesi posición cómoda y cierre fiorella ojos  Relaje fiorella músculos lentamente  Comience por los dedos de los pies y avance hacia arriba al merle de sheldon cuerpo  · Registre en un diario fiorella anum de [place]  Escriba cuándo comienzan y terminan fiorella migrañas  Rossanabuster Jorgensen y qué estaba haciendo cuando comenzó la migraña  Registre lo que comió y lo que tomó las 24 horas previas al comienzo de sheldon migraña  Aditi Mussel dolor y dónde le duele: Lleve un registro de lo que hizo para tratar sheldon Betsy Raring y si obtuvo un resultado satisfactorio  Cómo prevenir un dolor de traci karen:   · Evite cualquier cosa que provoque un dolor de traci karen  Los ejemplos incluyen la exposición a sustancias químicas, las grandes altitudes o no dormir lo suficiente  Glory kwesi rutina para dormir  Acuéstese y [employer] días a la misma hora  No utilice aparatos electrónicos antes de acostarse  Pueden provocarle un dolor de traci o impedirle dormir abby  · No fume  La nicotina y otras sustancias químicas en los cigarrillos y puros pueden desencadenar un dolor de traci karen o [place]  Pida información a sheldon médico si usted actualmente fuma y necesita ayuda para dejar de fumar  Los cigarrillos electrónicos o tabaco sin humo todavía contienen nicotina  Consulte con sheldon médico antes de [employer]  · Limite el consumo de alcohol según le indicaron  El alcohol puede provocar un dolor de traci karen o empeorarlo  Si usted tiene anum de [place] de racimo, no katy alcohol lora un episodio  Para otros tipos de anum de [place], pregúntele a sheldon proveedor de atención médica si es seguro para usted beber alcohol  Pregunte cuál es la cantidad bagley que puede beber y con qué frecuencia  · Ejercítese según indicaciones  El ejercicio puede reducir la tensión y [place] a aliviar el dolor de [place]   Propóngase hacer 30 minutos de [place] todos los días de la [place]  Dowling médico puede ayudarle a crear un plan de ejercicios  · Consuma alimentos saludables y variados  [address] frutas, verduras, productos lácteos bajos en grasa, alexey [place], pescado y frijoles cocidos  Dowling médico o dietista puede ayudarle a crear planes de comidas si desea evitar los alimentos que provocan anum de [place]  Acuda a jailyn consultas de control con dowling médico según le indicaron  Traiga dowling registro de anum de traci con usted cuando visite a dowling médico  Anote jailyn preguntas para que se acuerde de hacerlas lora jailyn visitas  © 2017 [address] Ned [address] Information is for End User's use only and may not be sold, redistributed or otherwise used for commercial purposes  All illustrations and images included in CareNotes® are the copyrighted property of A D A M  Inc  or Preston Celaya  Esta información es sólo para uso en educación  Dowling intención no es darle un consejo médico sobre enfermedades o tratamientos  Colsulte con dowling Freeda Jefferson farmacéutico antes de seguir cualquier régimen médico para saber si es seguro y efectivo para usted  Disnea   LO QUE NECESITA SABER:   La disnea es kwesi dificultad o incomodidad al respirar  Es posible que tenga respiración fatigosa, dolorosa o poco profunda  Es posible que le falte el aire  La disnea puede ocurrir [facility] en reposo o al realizar [employer]  Es posible que tenga disnea por un corto período de [place], o puede ser crónica  La disnea es a menudo un síntoma de kwesi enfermedad o afección  INSTRUCCIONES SOBRE EL [facility]PITAL[facility]IA:   Regrese a la paul de emergencias si:   · Jailyn signos y síntomas están igual o empeoran en las siguientes 24 horas del tratamiento  · Usted tiene escalofríos con temblores o fiebre de más de 102° F (38 9° C)  · Usted tiene un [employer], presión u opresión en dowling pecho       · Usted tiene kwesi nueva o empeora dowling tos o sibilancias (respiración ruidosa en el pecho) o expectora virginia    · Usted siente que no recibe suficiente aire  · La piel sobre fiorella costillas o en sheldon yulia se hunden cuando usted respira  · Usted tiene un heather dolor de traci con vómitos y dolor abdominal      · Usted se siente confundido o mareado  Comuníquese con sheldon especialista o médico sí:   · Usted tiene preguntas o inquietudes acerca de sheldon condición o cuidado  Medicamentos:   · Medicamentos,  pueden administrarse para tratar la causa de sheldon disnea  Los [place] pueden reducir la inflamación de las vías respiratorias o disminuir el líquido alrededor del corazón o los pulmones  Se pueden administrar otros medicamentos para reducir la ansiedad y para que se sienta más calmado y Layne ryan  · Lucien fiorella medicamentos yisel se le haya indicado  Consulte con sheldon médico si usted jorje que sheldon medicamento no le está ayudando o si presenta efectos secundarios  Infórmele si es alérgico a cualquier medicamento  Mantenga kwesi lista actualizada de los [place], las vitaminas y los productos herbales que daylin  Incluya los siguientes datos de los medicamentos: cantidad, frecuencia y motivo de administración  Traiga con usted la lista o los envases de la píldoras a fiorella citas de seguimiento  Lleve la lista de los medicamentos con usted en flakito de kwesi emergencia  Controle la disnea de larga duración:   · Elabore un plan de acción  Usted y sheldon médico pueden trabajar juntos para crear un plan sobre cómo [place] episodios de disnea  El plan puede incluir actividades cotidianas, cambios en el tratamiento y qué hacer si tiene problemas respiratorios graves  · Al sandie asiento inclínese hacia adelante en fiorella codos  [place] ayuda a [employer] y a facilitar la respiración  · Use la respiración con los labios fruncidos cada vez que se sienta corto de respiración    Respire por la nariz y muy despacio exhale por sheldon boca con los labios ligeramente fruncidos o en forma de ''U''  Se debería demorar el doble de tiempo al expulsar el aire de lo que le tee en inhalarlo  · No fume  La nicotina y otros químicos contenidos en los cigarrillos y puros pueden causar daño pulmonar y empeorar fiorella síntomas  Pida información a sheldon médico si usted actualmente fuma y necesita ayuda para dejar de fumar  Los cigarrillos electrónicos o tabaco sin humo todavía contienen nicotina  Consulte con sheldon médico antes de [employer]  · [place] o mantenga un peso saludable  Sheldon médico le puede ayudar a elaborar un plan para perder peso de kwesi forma bagley si usted tiene sobrepeso  · Ejercítese según indicaciones  El ejercicio ayuda a los pulmones a trabajar con más facilidad  El ejercicio también ayuda a perder peso, si fuera necesario  Trate de hacer unos 30 minutos de actividad física la mayoría de los días de la [place]  Sheldon médico puede ayudarlo a crear un plan de ejercicios que sea seguro para usted  Marcio kwesi milton de control con sheldon médico o especialista yisel se lo indicaron:  Anote fiorella preguntas para que se acuerde de hacerlas lora fiorella visitas  © 2017 26[address] Ned  Information is for End User's use only and may not be sold, redistributed or otherwise used for commercial purposes  All illustrations and images included in CareNotes® are the copyrighted property of A D A M , Inc  or Preston Celaya  Esta información es sólo para uso en educación  Sheldon intención no es darle un consejo médico sobre enfermedades o tratamientos  Colsulte con sheldon Gala Primer farmacéutico antes de seguir cualquier régimen médico para saber si es seguro y efectivo para usted

## 2018-01-24 NOTE — ED PROVIDER NOTES
History  Chief Complaint   Patient presents with    Headache     pt c/o posterior headache since this morning  pt report during night he woke up with SOB and vomited x2  pt denies CP or SOB at this time  History provided by:  Patient   used: No    Medical Problem   Location:  C/o dyspnea suddenly last night without cp or diaphoresis but had 2 episodes of vomiting  Resolved in 20 minutes  Severity:  Severe  Onset quality:  Sudden  Duration:  20 minutes  Timing:  Sporadic  Progression:  Resolved  Chronicity:  New  Context:  Multiple times over the last several months  Snores  Finds it easy to fall asleep during the day  Stress test last year unremarkable  No CP  Posterior HA today  Relieved by:  Nothing  Worsened by:  Nothing but only occurs at night during sleeping about 1 am   Ineffective treatments:  None tried  Associated symptoms: headaches, nausea, shortness of breath and vomiting    Associated symptoms: no abdominal pain, no chest pain, no congestion, no cough, no diarrhea, no ear pain, no fever, no loss of consciousness, no rash, no rhinorrhea, no sore throat and no wheezing    Headaches:     Chronicity:  New    No history of DVT or PE  Oxygen saturation 98%, no respiratory distress, normal respiratory rate, clear lungs  He is a smoker  Never been evaluated for sleep apnea  No history of heartburn  Stress test last year was unremarkable  Does have a right bundle branch block on the EKG which is old  None       Past Medical History:   Diagnosis Date    Hyperlipidemia        History reviewed  No pertinent surgical history  History reviewed  No pertinent family history  I have reviewed and agree with the history as documented  Social History   Substance Use Topics    Smoking status: Current Every Day Smoker    Smokeless tobacco: Never Used    Alcohol use No        Review of Systems   Constitutional: Negative for chills and fever     HENT: Negative for congestion, ear pain, rhinorrhea and sore throat  Respiratory: Positive for shortness of breath  Negative for cough, chest tightness and wheezing  Cardiovascular: Negative for chest pain, palpitations and leg swelling  Gastrointestinal: Positive for nausea and vomiting  Negative for abdominal pain and diarrhea  Genitourinary: Negative for difficulty urinating and dysuria  Musculoskeletal: Negative for gait problem and neck pain  Skin: Negative for rash  Neurological: Positive for headaches  Negative for dizziness, tremors, loss of consciousness, facial asymmetry, speech difficulty, weakness, light-headedness and numbness  All other systems reviewed and are negative  Physical Exam  ED Triage Vitals   Temperature Pulse Respirations Blood Pressure SpO2   01/24/18 0918 01/24/18 0918 01/24/18 0918 01/24/18 0918 01/24/18 0918   97 9 °F (36 6 °C) 72 16 118/69 99 %      Temp Source Heart Rate Source Patient Position - Orthostatic VS BP Location FiO2 (%)   01/24/18 0918 01/24/18 0918 01/24/18 0918 01/24/18 0918 --   Oral Monitor Sitting Right arm       Pain Score       01/24/18 1052       No Pain           Orthostatic Vital Signs  Vitals:    01/24/18 0918 01/24/18 1052   BP: 118/69 123/58   Pulse: 72 61   Patient Position - Orthostatic VS: Sitting        Physical Exam   Constitutional: He appears well-developed and well-nourished  He is cooperative  Non-toxic appearance  He does not have a sickly appearance  He does not appear ill  No distress  HENT:   Head: Normocephalic and atraumatic  Right Ear: Hearing and tympanic membrane normal  No drainage or swelling  Left Ear: Hearing and tympanic membrane normal  No drainage or swelling  Mouth/Throat: Uvula is midline, oropharynx is clear and moist and mucous membranes are normal  No oropharyngeal exudate  Eyes: Conjunctivae, EOM and lids are normal  Pupils are equal, round, and reactive to light  Right eye exhibits no discharge   Left eye exhibits no discharge  Neck: Trachea normal and normal range of motion  Neck supple  No JVD present  Cardiovascular: Normal rate, regular rhythm, normal heart sounds, intact distal pulses and normal pulses  Exam reveals no gallop and no friction rub  No murmur heard  Pulmonary/Chest: Effort normal and breath sounds normal  No stridor  No respiratory distress  He has no wheezes  He has no rales  Abdominal: Soft  Normal appearance  He exhibits no distension, no ascites and no mass  There is no hepatosplenomegaly  There is no tenderness  There is no rebound, no guarding and no CVA tenderness  Musculoskeletal: Normal range of motion  He exhibits no edema, tenderness or deformity  Lymphadenopathy:     He has no cervical adenopathy  Right: No inguinal adenopathy present  Left: No inguinal adenopathy present  Neurological: He is alert  He has normal strength  No cranial nerve deficit or sensory deficit  He exhibits normal muscle tone  Coordination and gait normal  GCS eye subscore is 4  GCS verbal subscore is 5  GCS motor subscore is 6  Skin: Skin is warm, dry and intact  No rash noted  He is not diaphoretic  No pallor  Psychiatric: He has a normal mood and affect  His speech is normal  Cognition and memory are normal    Nursing note and vitals reviewed        ED Medications  Medications   acetaminophen (TYLENOL) tablet 650 mg (650 mg Oral Given 1/24/18 0952)       Diagnostic Studies  Results Reviewed     Procedure Component Value Units Date/Time    B-type natriuretic peptide [[id number]]  (Normal) Collected:  01/24/18 0951    Lab Status:  Final result Specimen:  Blood from Arm, Left Updated:  01/24/18 1043     NT-proBNP 9 pg/mL     Troponin I [[id number]]  (Normal) Collected:  01/24/18 0951    Lab Status:  Final result Specimen:  Blood from Arm, Left Updated:  01/24/18 1026     Troponin I <0 02 ng/mL     Narrative:         Siemens Chemistry analyzer 99% cutoff is > 0 04 ng/mL in network labs    o cTnI 99% cutoff is useful only when applied to patients in the clinical setting of myocardial ischemia  o cTnI 99% cutoff should be interpreted in the context of clinical history, ECG findings and possibly cardiac imaging to establish correct diagnosis  o cTnI 99% cutoff may be suggestive but clearly not indicative of a coronary event without the clinical setting of myocardial ischemia  CBC and differential [[id number]]  (Normal) Collected:  01/24/18 0951    Lab Status:  Final result Specimen:  Blood from Arm, Left Updated:  01/24/18 1019     WBC 6 55 Thousand/uL      RBC 4 60 Million/uL      Hemoglobin 15 1 g/dL      Hematocrit 44 7 %      MCV 97 fL      MCH 32 8 pg      MCHC 33 8 g/dL      RDW 14 5 %      MPV 11 2 fL      Platelets 624 Thousands/uL      Neutrophils Relative 64 %      Lymphocytes Relative 27 %      Monocytes Relative 6 %      Eosinophils Relative 3 %      Basophils Relative 1 %      Neutrophils Absolute 4 18 Thousands/µL      Lymphocytes Absolute 1 74 Thousands/µL      Monocytes Absolute 0 37 Thousand/µL      Eosinophils Absolute 0 22 Thousand/µL      Basophils Absolute 0 04 Thousands/µL     Basic metabolic panel [[id number]]  (Abnormal) Collected:  01/24/18 0951    Lab Status:  Final result Specimen:  Blood from Arm, Left Updated:  01/24/18 1017     Sodium 143 mmol/L      Potassium 4 0 mmol/L      Chloride 108 mmol/L      CO2 25 mmol/L      Anion Gap 10 mmol/L      BUN 15 mg/dL      Creatinine 1 15 mg/dL      Glucose 117 mg/dL      Calcium 8 0 (L) mg/dL      eGFR 73 ml/min/1 73sq m     Narrative:         National Kidney Disease Education Program recommendations are as follows:  GFR calculation is accurate only with a steady state creatinine  Chronic Kidney disease less than 60 ml/min/1 73 sq  meters  Kidney failure less than 15 ml/min/1 73 sq  meters  I have personally reviewed the x-ray and my findings are: no acute disease        XR chest 2 views   Final Result by Jordan Rodriges MD (01/24 1027)      No active pulmonary disease  Workstation performed: [id number]         CT head without contrast   Final Result by Josseline Belcher DO (01/24 1013)      No acute intracranial abnormality  Workstation performed: [id number]                    Procedures  ECG 12 Lead Documentation  Date/Time: 1/24/2018 10:00 AM  Performed by: Stephanie Rosa by: Deacon Bowman     Indications / Diagnosis:  Dyspnea, headache  ECG reviewed by me, the ED Provider: yes    Patient location:  ED  Previous ECG:     Previous ECG:  Compared to current    Comparison ECG info:  8/2006    Similarity:  No change  Interpretation:     Interpretation: non-specific    Rate:     ECG rate:  66    ECG rate assessment: normal    Rhythm:     Rhythm: sinus rhythm    Ectopy:     Ectopy: none    QRS:     QRS axis:  Left    QRS intervals: Wide  Conduction:     Conduction: abnormal      Abnormal conduction: complete RBBB    ST segments:     ST segments:  Normal  T waves:     T waves: non-specific             Phone Contacts  ED Phone Contact    ED Course  ED Course                                MDM  Number of Diagnoses or Management Options  Dyspnea:   Headache:   Diagnosis management comments: Unclear etiology  This has happened multiple times and given his complex of symptoms I am concerned about the possibility that he could be suffering from sleep apnea which is causing this sudden onset of awakening  He has no signs of congestive heart failure, chest x-ray is clear, lungs clear, normal oxygen saturation  No DVT or PE risk factors, no leg swelling or tenderness  EKG appears unchanged with a negative troponin and BNP  No major electrolyte abnormalities and he is not anemic  He can try Pepcid at night to see if this potentially could be heartburn and I told him to avoid alcohol before he goes to bed and no sleep medicine  He can follow up with his primary care doctor         Amount and/or Complexity of Data Reviewed  Clinical lab tests: ordered and reviewed  Tests in the radiology section of CPT®: ordered and reviewed  Tests in the medicine section of CPT®: ordered and reviewed  Review and summarize past medical records: yes    Patient Progress  Patient progress: stable    CritCare Time    Disposition  Final diagnoses:   Headache   Dyspnea     Time reflects when diagnosis was documented in both MDM as applicable and the Disposition within this note     Time User Action Codes Description Comment    1/24/2018 10:55 AM Go Gutierrez Add [R51] Headache     1/24/2018 10:55 AM Go Gutierrez Add [R06 00] Dyspnea       ED Disposition     ED Disposition Condition Comment    Discharge  Danae Thakur discharge to home/self care  Condition at discharge: Good        Follow-up Information     Follow up With Specialties Details Why Contact Info    [facility] Schedule an appointment as soon as possible for a visit  THE [facility] [facility] [phone] [facility]          There are no discharge medications for this patient  No discharge procedures on file      ED Provider  Electronically Signed by           El Fernando MD  01/24/18 0902

## 2018-01-25 ENCOUNTER — TRANSCRIBE ORDERS (OUTPATIENT)
Dept: LAB | Facility: CLINIC | Age: 53
End: 2018-01-25

## 2018-01-25 ENCOUNTER — APPOINTMENT (OUTPATIENT)
Dept: LAB | Facility: CLINIC | Age: 53
End: 2018-01-25
Payer: COMMERCIAL

## 2018-01-25 ENCOUNTER — OFFICE VISIT (OUTPATIENT)
Dept: FAMILY MEDICINE CLINIC | Facility: CLINIC | Age: 53
End: 2018-01-25
Payer: COMMERCIAL

## 2018-01-25 VITALS
HEART RATE: 82 BPM | HEIGHT: 67 IN | TEMPERATURE: 96.1 F | OXYGEN SATURATION: 99 % | BODY MASS INDEX: 29.47 KG/M2 | SYSTOLIC BLOOD PRESSURE: 128 MMHG | WEIGHT: 187.8 LBS | DIASTOLIC BLOOD PRESSURE: 66 MMHG | RESPIRATION RATE: 18 BRPM

## 2018-01-25 DIAGNOSIS — G47.33 OBSTRUCTIVE SLEEP APNEA: Primary | ICD-10-CM

## 2018-01-25 DIAGNOSIS — E78.5 HYPERLIPIDEMIA, UNSPECIFIED HYPERLIPIDEMIA TYPE: ICD-10-CM

## 2018-01-25 PROBLEM — G25.81 RESTLESS LEG SYNDROME: Status: ACTIVE | Noted: 2017-01-19

## 2018-01-25 PROBLEM — N52.9 ERECTILE DYSFUNCTION: Status: ACTIVE | Noted: 2017-05-11

## 2018-01-25 PROBLEM — M54.9 BACK PAIN: Status: ACTIVE | Noted: 2017-01-19

## 2018-01-25 LAB
ALBUMIN SERPL BCP-MCNC: 4 G/DL (ref 3.5–5)
ALP SERPL-CCNC: 80 U/L (ref 46–116)
ALT SERPL W P-5'-P-CCNC: 41 U/L (ref 12–78)
ANION GAP SERPL CALCULATED.3IONS-SCNC: 4 MMOL/L (ref 4–13)
AST SERPL W P-5'-P-CCNC: 22 U/L (ref 5–45)
BILIRUB SERPL-MCNC: 0.31 MG/DL (ref 0.2–1)
BUN SERPL-MCNC: 15 MG/DL (ref 5–25)
CALCIUM SERPL-MCNC: 8.9 MG/DL (ref 8.3–10.1)
CHLORIDE SERPL-SCNC: 109 MMOL/L (ref 100–108)
CHOLEST SERPL-MCNC: 202 MG/DL (ref 50–200)
CO2 SERPL-SCNC: 28 MMOL/L (ref 21–32)
CREAT SERPL-MCNC: 1.03 MG/DL (ref 0.6–1.3)
GFR SERPL CREATININE-BSD FRML MDRD: 84 ML/MIN/1.73SQ M
GLUCOSE P FAST SERPL-MCNC: 100 MG/DL (ref 65–99)
HDLC SERPL-MCNC: 41 MG/DL (ref 40–60)
LDLC SERPL CALC-MCNC: 138 MG/DL (ref 0–100)
POTASSIUM SERPL-SCNC: 3.9 MMOL/L (ref 3.5–5.3)
PROT SERPL-MCNC: 7.6 G/DL (ref 6.4–8.2)
SODIUM SERPL-SCNC: 141 MMOL/L (ref 136–145)
TRIGL SERPL-MCNC: 116 MG/DL

## 2018-01-25 PROCEDURE — 80061 LIPID PANEL: CPT

## 2018-01-25 PROCEDURE — 80053 COMPREHEN METABOLIC PANEL: CPT

## 2018-01-25 PROCEDURE — 99213 OFFICE O/P EST LOW 20 MIN: CPT | Performed by: PHYSICIAN ASSISTANT

## 2018-01-25 PROCEDURE — 36415 COLL VENOUS BLD VENIPUNCTURE: CPT

## 2018-01-25 RX ORDER — METHOCARBAMOL 500 MG/1
1 TABLET, FILM COATED ORAL 3 TIMES DAILY
COMMUNITY
Start: 2017-01-19 | End: 2019-12-11 | Stop reason: SDUPTHER

## 2018-01-25 RX ORDER — ATORVASTATIN CALCIUM 20 MG/1
1 TABLET, FILM COATED ORAL DAILY
COMMUNITY
Start: 2017-01-31 | End: 2018-01-25 | Stop reason: SDDI

## 2018-01-25 RX ORDER — GABAPENTIN 300 MG/1
1 CAPSULE ORAL
COMMUNITY
Start: 2017-01-19 | End: 2020-06-16 | Stop reason: SDUPTHER

## 2018-01-25 RX ORDER — GEMFIBROZIL 600 MG/1
1 TABLET, FILM COATED ORAL DAILY
COMMUNITY
Start: 2017-02-24 | End: 2018-01-25 | Stop reason: SDDI

## 2018-01-25 RX ORDER — NAPROXEN 500 MG/1
1 TABLET ORAL
COMMUNITY
Start: 2017-01-19 | End: 2019-12-11 | Stop reason: SDUPTHER

## 2018-01-25 NOTE — PATIENT INSTRUCTIONS
Hiperlipidemia   LO QUE NECESITA SABER:   ¿Qué es la hiperlipidemia? La hiperlipidemia son los [address] de lípidos (Selwyn Roe) en [place]  Estos lípidos incluyen al colesterol o triglicéridos  Los lípidos son producidos por dowling cuerpo  También provienen de los [place] usted consume  [place] cuerpo necesita lípidos para funcionar correctamente, rey los niveles altos aumentan dowling riesgo de enfermedad cardíaca, ataque al corazón y de un derrame cerebral   ¿Qué aumenta mi riesgo para hiperlipidemia? · Antecedentes familiares de niveles altos de lípidos    · Georgie dieta abdoulaye en grasas saturadas, colesterol o calorías     · Consumo elevado de alcohol o fumar    · Falta de actividad física regular    · Condiciones médicas yisel el hipotiroidismo, obesidad o diabetes tipo 2    · Ciertos medicamentos, yisel los de la presión arterial, hormonas y esteroides  ¿Cómo se controla y trata la hiperlipidemia? [place] médico podría recomendarle que usted realice cambios en dow[place] estilo de marta para ayudarlo a disminuir los niveles de los lípidos  Es posible que usted también necesite sandie medicamentos para disminuir fiorella niveles de lípidos  Algunos de los cambios en dow[place] estilo de marta que podrían ser necesarios incluyen los siguientes:  · Mantenga un peso saludable  Consulte con [place] médico cuánto debería pesar  Solicite que lo ayude a crear un plan para bajar de peso si tiene sobrepeso  La pérdida de peso puede disminuir fiorella niveles de colesterol y triglicéridos  · Ejercítese según indicaciones  El ejercicio reduce los niveles de colesterol y lo ayuda a mantener un peso saludable  Marcio 40 minutos o más de ejercicio [facility] 3 y 4 días cada semana  Puede dividir el ejercicio en cuatro entrenamientos de 10 minutos en vez de 40 minutos a la vez  Los ejemplos de ejercicio moderado incluyen caminar enérgicamente, nadar o montar en bicicleta  Trabaje con [place] médico para planificar el mejor programa de ejercicios para usted      · No fume   La nicotina y otros químicos de los cigarrillos y cigarros pueden aumentar el riesgo de ataque cardíaco y accidente cerebrovascular  Pida información a sheldon médico si usted actualmente fuma y necesita ayuda para dejar de fumar  Los cigarrillos electrónicos o tabaco sin humo todavía contienen nicotina  Consulte con sheldon médico antes de [employer]  · Consuma alimentos saludables para el corazón  Hable con sheldon dietista acerca de kwesi dieta saludable para el corazón  Lo siguiente le ayudará a controlar sheldon hiperlipidemia:     ¨ Reduzca la cantidad total de grasa que usted consume  Elija alexey magras, leche descremada o con 1% de [place] y productos lácteos bajos en grasa, yisel yogur y Jasvir-barre  Limite o evite el consumo de carne Juan Roper  La carne daniel es abdoulaye en grasas y colesterol  [address] [facility] grasas no saludables por grasa saludables  Las grasa que no son saludables incluyen a las grasas saturadas, grasas transgénicas y el colesterol  Elija margarinas suaves que raul bajas en grasas saturadas y que tengan poca o nada de grasas transgénicas  Las grasas monoinsaturadas son grasas saludables  Estas se encuentran en el aceite de russell, el aceite de canola, el aguacate y los madiha secos  Las grasas poliinsaturadas también son saludables  Estas se encuentran en el pescado, la linaza, las nueces y la soya  ¨ Consuma frutas y [employer]  Estas son bajas en calorías y [address] y son Enrike Walt buena priyanka de vitaminas esenciales  [address] verduras de [employer] oscuro, damon y anaranjado  Los ejemplos incluyen espinaca, col rizada, brócoli y zanahorias  ¨ [place] alimentos ricos en fibras  Elija alimentos de granos enteros y [employer]  Las buenas harper [place] panes integrales o los cereales, los frijoles, chícharos, frutas y verduras  · Pregunte a sheldon médico si usted puede sandie alcohol  El alcohol puede aumentar sheldon presión arterial y los niveles de triglicéridos   Un trago equivale a 12 onzas de cerveza, 5 onzas de vino o 1 onza y ½ de [address]  Llame al 911 en flakito de presentar lo siguiente:   · Usted tiene alguno de los siguientes signos de un ataque cardíaco:      ¨ Estornudos, presión, o dolor en sheldon pecho que dura mas de 5 minutos o regresa  ¨ Malestar o dolor en sheldon espalda, yulia, mandíbula, abdomen, o brazo     ¨ Dificultad para respirar    ¨ Náuseas o vómito    ¨ Siente un desvanecimiento o tiene sudores fríos especialmente en el pecho o dificultad para respirar  · Usted tiene alguno de los siguientes signos de derrame cerebral:      ¨ Adormecimiento o caída de un lado de sheldon rishi     ¨ Debilidad en un brazo o kwesi pierna    ¨ Confusión o debilidad para hablar    ¨ Mareos o dolor de traci intenso, o pérdida de la visión  ¿Cuándo davidson comunicarme con mi médico?   · Usted tiene preguntas o inquietudes acerca de sheldon condición o cuidado  ACUERDOS SOBRE SHELDON CUIDADO:   Usted tiene el derecho de ayudar a planear sheldon cuidado  Aprenda todo lo que pueda sobre sheldon condición y yisel darle tratamiento  Discuta fiorella opciones de tratamiento con fiorella médicos para decidir el cuidado que usted desea recibir  Usted siempre tiene el derecho de rechazar el tratamiento  Esta información es sólo para uso en educación  Sheldon intención no es darle un consejo médico sobre enfermedades o tratamientos  Colsulte con sheldon Ladena Creed farmacéutico antes de seguir cualquier régimen médico para saber si es seguro y efectivo para usted  © 2017 [address] Information is for End User's use only and may not be sold, redistributed or otherwise used for commercial purposes  All illustrations and images included in CareNotes® are the copyrighted property of A D A M , Inc  or [address]  Apnea del sueño   LO QUE NECESITA SABER:   ¿Qué es la apnea de sueño? El apnea del sueño también se conoce yisel apnea obstructiva del sueño   Es kwesi condición que provoca que usted deje de respirar por 10 segundos o [employer] dormido  Lora el sueño normal, fiorella músculos mantienen abierta sheldon garganta, permitiendo que el aire pase fácilmente  El apnea del sueño provoca que los músculos y tejidos alrededor de sheldon garganta se relajen y obstruyan el paso del aire  El apnea del sueño podría suceder varias veces mientras usted duerme  ¿Qué aumenta mi riesgo de apnea del sueño? · Ingerir alcohol o sandie medicamentos para relajarse antes de dormir    · Obesidad o un yulia que mide 16 pulgadas (41 centímetros) o más     · Fumar cigarrillos    · Ser hombre o ser kwesi sixto en etapa de menopausia    · Un historial familiar de apnea del sueño    · Ciertas condiciones médicas, yisel la hipertensión, diabetes o derrame cerebral     · Un tabique desviado, lengua o amígdalas grandes, sobremordida o mentón pequeño  ¿Cuáles son los signos y síntomas del apnea del sueño? · No mostrar signos de que está respirando por 10 segundos o [address] duerme    · [employer], bufar, jadear o asfixiarse mientras duerme y despertarse repentinamente debido a esto    · Dificultad para pensar, recordar o enfocarse en quehaceres para el día siguiente    · Dolor de traci o náusea    · Mojar la cama o despertarse frecuentemente lora la noche para orinar    · [employer], lento y cansado lora el día  ¿Cómo se diagnostica el apnea del sueño? · Sheldon médico le preguntará sobre fiorella signos y síntomas, cuándo empezaron y qué tan grave son  Es probable que le pregunte sobre fiorella condiciones médicas y cuáles medicamentos daylin  Dígale a sheldon médico si usted fuma y si alguien de sheldon ayaan tiene apnea del sueño  También es probable que sheldon médico le pregunte a la persona que duerme a sheldon lado sobre fiorella síntomas  · Es probable que usted necesite usar un aparato que monitorea el nivel de oxígeno en [place] mientras usted duerme   Lo más probable es que usted tenga que someterse a un estudio del sueño (polisomnografía) si usted está somnoliento lora el día  El estudio del sueño ayuda a mostrar el funcionamiento de sheldon cerebro, corazón y sistema respiratorio mientras usted duerme  También podría monitorear las etapas del sueño, los niveles de oxígeno, la posición del cuerpo, el movimiento de los ojos y los ronquidos  ¿Cómo se trata el apnea del sueño? · Eloisa Del máquina para medir la presión positiva continua en las vías respiratorias (PPCVR)  se [place] para mantener abiertas fiorella vías respiratorias mientras usted duerme  Torie Rakes se coloca sobre sheldon nariz y [place], o solo en sheldon boca  La máscara estará conectada a la máquina de CPAP  La PPCVR libera un soplo de aire suave en la máscara cuando usted respira  Greasy ayuda a mantener fiorella vías respiratorias abiertas para que usted pueda respirar con más regularidad  Podrían darle oxígeno adicional a través de la [place]  · Un dispositivo bucal  podría ser recomendado por sheldon médico  El dispositivo se parece a un protector bucal o retenedor dental  Lissa dispositivo no permite que la lengua y los tejidos de sheldon boca obstruyan sheldon garganta mientras duerme  · Cirugía  podría ser necesaria para extraer el tejido adicional que está obstruyendo sheldon boca, [place]  ¿Cómo puedo controlar los síntomas? · No fume  La nicotina y otras sustancias químicas que contienen los cigarrillos y cigarros pueden dañar los pulmones  Pida información a sheldon médico si usted actualmente fuma y necesita ayuda para dejar de fumar  Los cigarrillos electrónicos o tabaco sin humo todavía contienen nicotina  Consulte con sheldon médico antes de [employer]  · No ingiera alcohol ni tome sedantes antes de dormir  El alcohol y los sedantes pueden relajar los músculos y tejidos que están alrededor de sheldon garganta  Greasy puede obstruir el flujo de aire a fiorella pulmones  · Mantenga un peso saludable  El tejido en exceso alrededor de sheldon garganta puede llegar a restringir sheldon respiración   [place] a sheldon médico información si necesita perder peso  · Duerma de un lado o use almohadas especiales diseñadas para evitar el apnea del sueño  [place] dayana que goems lengua y otros tejidos obstruyan la garganta  Usted también puede intentar elevar la cabecera de gomes cama  ¿Cuándo davidson buscar atención inmediata? · Usted tiene dolor torácico y dificultad para respirar  ¿Cuándo davidson comunicarme con mi médico?   · Usted se siente muy cansado o deprimido  · Usted tiene dificultad para mantenerse despierto lora el día  · Tiene dificultad para pensar y recordar cosas  · Usted tiene preguntas o inquietudes acerca de gomes condición o cuidado  ACUERDOS SOBRE GOMES CUIDADO:   Usted tiene el derecho de ayudar a planear gomes cuidado  Aprenda todo lo que pueda sobre gomes condición y yisel darle tratamiento  Discuta fiorella opciones de tratamiento con fiorella médicos para decidir el cuidado que usted desea recibir  Usted siempre tiene el derecho de rechazar el tratamiento  Esta información es sólo para uso en educación  Gomes intención no es darle un consejo médico sobre enfermedades o tratamientos  Colsulte con gomes Ahmet Marija farmacéutico antes de seguir cualquier régimen médico para saber si es seguro y efectivo para usted  © 2017 [address] Information is for End User's use only and may not be sold, redistributed or otherwise used for commercial purposes  All illustrations and images included in CareNotes® are the copyrighted property of A D A M , Inc  or Preston Celaya

## 2018-01-25 NOTE — PROGRESS NOTES
Assessment/Plan:    Hyperlipidemia  Ordered lab work       Diagnoses and all orders for this visit:    Obstructive sleep apnea  -     Ambulatory referral to Sleep Medicine; Future    Hyperlipidemia, unspecified hyperlipidemia type  -     Comprehensive metabolic panel; Future  -     Lipid panel; Future    Other orders  -     atorvastatin (LIPITOR) 20 mg tablet; Take 1 tablet by mouth daily  -     gabapentin (NEURONTIN) 300 mg capsule; Take 1 capsule by mouth  -     gemfibrozil (LOPID) 600 mg tablet; Take 1 tablet by mouth daily  -     methocarbamol (ROBAXIN) 500 mg tablet; Take 1 tablet by mouth 3 (three) times a day  -     naproxen (NAPROSYN) 500 mg tablet; Take 1 tablet by mouth          Subjective:      Patient ID: June Avilez is a 47 y o  male  Uruguayan-speaking, translation by wife  60-year-old male presenting with concerns of sleep apnea x2 months  Patient states that he is waking up almost every night from a dead sleep gasping for air  States that symptoms typically last 15-20 seconds  Afterwards patient is able to go back to sleep  Wife states that patient does snore loudly throughout the night  Patient typically tries to eat a hours of sleep at night, however when he wakes up in the morning he has daytime fatigue  Associates symptoms when shortness of breath does occur can include headaches  Patient does state that he was struggling to breathe 2 nights ago which caused him to vomit  Currently denies any fevers, chills  There is duplication of cholesterol medication in patient's profile  White states that patient is currently not taking any cholesterol medication  The following portions of the patient's history were reviewed and updated as appropriate: allergies, current medications, past family history, past medical history, past social history, past surgical history and problem list     Review of Systems   Constitutional: Positive for fatigue   Negative for activity change, appetite change, chills and fever  HENT: Negative for facial swelling, postnasal drip, rhinorrhea and sore throat  Respiratory: Positive for apnea and shortness of breath  Negative for cough, choking, chest tightness, wheezing and stridor  Cardiovascular: Negative  Gastrointestinal:        As noted in HPI   Neurological: Positive for headaches  Negative for dizziness, syncope and weakness  Objective:     Physical Exam   Constitutional: He appears well-developed and well-nourished  No distress  HENT:   Nose: Nose normal    Mouth/Throat: Oropharynx is clear and moist    Neck: Normal range of motion  Neck supple  No thyromegaly present  Cardiovascular: Normal rate, regular rhythm and normal heart sounds  Pulmonary/Chest: Effort normal and breath sounds normal  No respiratory distress  He has no wheezes  He has no rales  He exhibits no tenderness  Abdominal: Soft  Bowel sounds are normal    Lymphadenopathy:     He has no cervical adenopathy

## 2018-02-02 ENCOUNTER — OFFICE VISIT (OUTPATIENT)
Dept: SLEEP CENTER | Facility: CLINIC | Age: 53
End: 2018-02-02

## 2018-02-02 VITALS
BODY MASS INDEX: 27.78 KG/M2 | DIASTOLIC BLOOD PRESSURE: 64 MMHG | SYSTOLIC BLOOD PRESSURE: 106 MMHG | HEIGHT: 69 IN | WEIGHT: 187.6 LBS | HEART RATE: 60 BPM

## 2018-02-02 DIAGNOSIS — G89.4 CHRONIC PAIN DISORDER: ICD-10-CM

## 2018-02-02 DIAGNOSIS — G47.33 OBSTRUCTIVE SLEEP APNEA: Primary | ICD-10-CM

## 2018-02-02 DIAGNOSIS — G47.09 OTHER INSOMNIA: ICD-10-CM

## 2018-02-02 DIAGNOSIS — R06.81 GERD WITH APNEA: ICD-10-CM

## 2018-02-02 DIAGNOSIS — G47.10 HYPERSOMNIA: ICD-10-CM

## 2018-02-02 DIAGNOSIS — G25.81 RESTLESS LEG SYNDROME: ICD-10-CM

## 2018-02-02 DIAGNOSIS — E66.3 OVERWEIGHT (BMI 25.0-29.9): ICD-10-CM

## 2018-02-02 DIAGNOSIS — K21.9 GERD WITH APNEA: ICD-10-CM

## 2018-02-02 DIAGNOSIS — R00.2 PALPITATION: ICD-10-CM

## 2018-02-02 DIAGNOSIS — R35.1 NOCTURIA: ICD-10-CM

## 2018-02-02 NOTE — PROGRESS NOTES
Review of Systems      Genitourinary frequent urination and frequent urination at night   Cardiology chest pain, palpitations/fluttering feeling in your chest and lightheadedness   Gastrointestinal heartburn/acid reflux   Neurology headaches, muscle weakness, numbness/tingling of an extremity, loss of consciousness, decreased concentration, confusion, difficulity finding words and loss/change of vision   Constitutional none   Integumentary none   Psychiatry anxiety, depression and irritability   Musculoskeletal joint pain, muscle tenderness/aching, back pain and sciatica   Pulmonary awakening with choking sensation, shortness of breath and chest tightness   ENT nasal or sinus congestion, throat clearing and ringing in ears   Endocrine excessive thirst, excessive hunger, intolerance of heat and intolerance of cold   Hematological none       Patient states that in the last month he has had moments where he loses respiration and can not breath for about 10-20seconds  He states that this has happened six times at night and one time during the day within the last month

## 2018-02-02 NOTE — PROGRESS NOTES
Consultation - Sleep Center   Jessa Purdy  47 y o  male  :1965  XLB:[id number]    Physician Requesting Consult: Javier Nogueira PA-C     Reason for Consult : At your kind request I saw this patient for initial sleep evaluation today  The patient is here to evaluate for suspected Obstructive Sleep Apnea  Medications, Past, Family and Social Histories were reviewed  Herewith findings in summary  Full details are available from our records  Current Outpatient Prescriptions   Medication Sig Dispense Refill    gabapentin (NEURONTIN) 300 mg capsule Take 1 capsule by mouth      methocarbamol (ROBAXIN) 500 mg tablet Take 1 tablet by mouth 3 (three) times a day      naproxen (NAPROSYN) 500 mg tablet Take 1 tablet by mouth       No current facility-administered medications for this visit  HPI:  He presents with complaint of awakenings with choking that started 2 months ago  At times these episodes are associated with palpitations  He also reports snoring ongoing for approximately 2 decades  He is not aware of modifying factors  He has radicular symptoms involving the right lower extremity and reported symptoms suggestive of restless leg syndrome including involuntary jerking during sleep  He reported no features of parasomnia  Sleep Routine:  He is spending 7-1/2 hours in bed  He typically takes an hour or more to fall asleep with use of over-the-counter sleep aids  He denied racing thoughts  He watches TV in bed  Sleep is interrupted at least 3 times a night  He awakens with the aid of an alarm and is not refreshed  He has excessive daytime drowsiness and is napping for 1-2 hours a day but  under rated himself at Total score: 6 /24 on the [place] Sleepiness Scale  Habits:   reports that he has been smoking Cigarettes  He has never used smokeless tobacco ,  reports that he does not drink alcohol ,  reports that he does not use drugs  ,  He uses moderate amount of caffeine    He does not exercise ]     12 point ROS:  Attached    Family History:Negative for sleep disturbanc    Physical Exam: Vitals are stable: Blood pressure 106/64, pulse 60, height 5' 9" (1 753 m), weight 85 1 kg (187 lb 9 6 oz)  BMI: Body mass index is 27 7 kg/m²      This is a well groomed, well appearingmale, in no distress  Mental state  appears normal  Craniofacial anatomy[is briseyda  There are no abnormal neck masses  Nasal airwayis paten  Mucous membranes appeared normal  The oral airway reveals Base of tongue is at Modified Mallampati class I  There is redundant soft palate tissue and AP narrowing  The rest of his exam was unremarkable  Impression:   1  Obstructive sleep apnea  Ambulatory referral to Sleep Medicine    Diagnostic Sleep Study    CPAP Study    Sleep CPAP setup with DME   2  Other insomnia     3  Restless leg syndrome     4  Hypersomnia     5  Chronic pain disorder     6  Nocturia     7  Overweight (BMI 25 0-29 9)     8  Palpitation     9  GERD with apnea          Plan:   1  With respect to above conditions, I counseled on pathophysiology, diagnosis, treatment options, risks and benefits; interrelationship and effects on symptoms and comorbidities; risks of no treatment; costs and insurance aspects  2  I advised on weight reduction, avoiding sleeping supine, using alcohol or sedating medications close to bed time and on safe driving practices  3  I did some  Cognitive behavioral therapy, advised on Sleep Hygiene and behavioral techniques to manage Insomnia  4  Nocturnal polysomnography is indicated and a diagnostic study will be scheduled  5  Patient is willing to try Positive airway pressure therapy and will be scheduled for a titration study if indicated  6  Follow-up will be scheduled after the studies to review results, further details of treatment options and to initiate/adjust therapy  Thank you for allowing me to participate in the care of this patient   I will keep you apprised of developments      Sincerely,    Vidya Marsh MD  Board Certified Specialist

## 2018-02-04 ENCOUNTER — HOSPITAL ENCOUNTER (OUTPATIENT)
Dept: SLEEP CENTER | Facility: CLINIC | Age: 53
Discharge: HOME | End: 2018-02-04
Payer: COMMERCIAL

## 2018-02-04 DIAGNOSIS — G47.33 OBSTRUCTIVE SLEEP APNEA: ICD-10-CM

## 2018-02-04 PROCEDURE — 95810 POLYSOM 6/> YRS 4/> PARAM: CPT

## 2018-02-05 NOTE — PROGRESS NOTES
Sleep Study Documentation    Pre-Sleep Study       Sleep testing procedure explained to patient:YES    Patient napped prior to study:YES- more than 30 minutes  Napped after 2PM: yes    Caffeine:Dayshift worker after 12PM   Caffeine use:NO    Alcohol:Dayshift workers after 5PM: Alcohol use:NO    Typical day for patient:YES       Study Documentation    Study type: Diagnostic   Snore:None  Supplemental O2: no    O2 flow rate (L/min) range  N/A  O2 flow rate (L/min) final  N/A   Minimum SaO2 91%  Baseline SaO2 96%                  Study Terminated: N/A study was completed      Post-Sleep Study    Medication used at bedtime or during sleep study:NO    Patient reports time it took to fall asleep:less than 20 minutes    Patient reports waking up during study:1 to 2 times  Patient reports returning to sleep in 10 to 30 minutes  Patient reports sleeping 4 to 6 hours with dreaming      Patient reports sleep during study:typical    Patient rated sleepiness: Somewhat sleepy or tired    Post PAP treatment patient reports feeling N/A

## 2018-02-12 ENCOUNTER — HOSPITAL ENCOUNTER (OUTPATIENT)
Dept: SLEEP CENTER | Facility: CLINIC | Age: 53
Discharge: HOME | End: 2018-02-12
Payer: COMMERCIAL

## 2018-02-12 ENCOUNTER — TELEPHONE (OUTPATIENT)
Dept: SLEEP CENTER | Facility: CLINIC | Age: 53
End: 2018-02-12

## 2018-02-12 DIAGNOSIS — G47.33 OBSTRUCTIVE SLEEP APNEA: ICD-10-CM

## 2018-02-12 PROCEDURE — 95811 POLYSOM 6/>YRS CPAP 4/> PARM: CPT

## 2018-02-13 NOTE — PROGRESS NOTES
Sleep Study Documentation    Pre-Sleep Study       Sleep testing procedure explained to patient:YES    Patient napped prior to study:NO    Caffeine:Dayshift worker after 12PM   Caffeine use:NO    Alcohol:Dayshift workers after 5PM: Alcohol use:NO    Typical day for patient:YES              Study Documentation  Treatment   Optimal PAP pressure: 8  Leak:Small  Snore:Eliminated  REM Obtained:yes  Supplemental O2: no    Minimum SaO2 94  Baseline SaO2 96  PAP mask tried (list all) ResMed AirFit P10; [employer] Eson  PAP mask choice (final) [employer] Eson medium  Mode of Therapy:CPAP        EKG abnormalities: no If yes, EPOCH example and comments:     EEG abnormalities: no If yes, EPOCH example and comments          Post-Sleep Study    Medication used at bedtime or during sleep study:NO    Patient reports time it took to fall asleep:less than 20 minutes    Patient reports waking up during study:1 to 2 times  Patient reports returning to sleep in 10 to 30 minutes  Patient reports sleeping less than 2 hours without dreaming  Patient reports sleep during study:typical    Patient rated sleepiness: Somewhat sleepy or tired    PAP treatment:no

## 2018-02-16 ENCOUNTER — TELEPHONE (OUTPATIENT)
Dept: SLEEP CENTER | Facility: CLINIC | Age: 53
End: 2018-02-16

## 2018-02-16 DIAGNOSIS — G47.33 OSA (OBSTRUCTIVE SLEEP APNEA): Primary | ICD-10-CM

## 2018-02-16 NOTE — TELEPHONE ENCOUNTER
Left patient a message, recd CPAP study results will fax to [facility] today

## 2018-02-23 ENCOUNTER — HOSPITAL ENCOUNTER (OUTPATIENT)
Dept: SLEEP CENTER | Facility: CLINIC | Age: 53
Discharge: HOME | End: 2018-02-23

## 2018-02-23 ENCOUNTER — TELEPHONE (OUTPATIENT)
Dept: SLEEP CENTER | Facility: CLINIC | Age: 53
End: 2018-02-23

## 2018-02-23 DIAGNOSIS — G47.33 OBSTRUCTIVE SLEEP APNEA: ICD-10-CM

## 2018-02-23 NOTE — TELEPHONE ENCOUNTER
Pt states he has a lot of anxiety at nite  He is going to start the CPAP tonight and is asking if he can have medication to help him sleep

## 2018-02-26 NOTE — TELEPHONE ENCOUNTER
Call to patient to notify of physicians answer regarding antianxiety medication( used)  Voicemail left by

## 2018-03-27 ENCOUNTER — OFFICE VISIT (OUTPATIENT)
Dept: SLEEP CENTER | Facility: CLINIC | Age: 53
End: 2018-03-27

## 2018-03-27 VITALS
HEIGHT: 69 IN | HEART RATE: 72 BPM | BODY MASS INDEX: 27.78 KG/M2 | SYSTOLIC BLOOD PRESSURE: 110 MMHG | WEIGHT: 187.6 LBS | DIASTOLIC BLOOD PRESSURE: 62 MMHG

## 2018-03-27 DIAGNOSIS — R06.81 GERD WITH APNEA: ICD-10-CM

## 2018-03-27 DIAGNOSIS — G47.10 HYPERSOMNIA: ICD-10-CM

## 2018-03-27 DIAGNOSIS — G47.61 PLMD (PERIODIC LIMB MOVEMENT DISORDER): ICD-10-CM

## 2018-03-27 DIAGNOSIS — E66.3 OVERWEIGHT (BMI 25.0-29.9): ICD-10-CM

## 2018-03-27 DIAGNOSIS — K21.9 GERD WITH APNEA: ICD-10-CM

## 2018-03-27 DIAGNOSIS — G47.09 OTHER INSOMNIA: ICD-10-CM

## 2018-03-27 DIAGNOSIS — G47.33 OSA (OBSTRUCTIVE SLEEP APNEA): Primary | ICD-10-CM

## 2018-03-27 NOTE — PROGRESS NOTES
Review of Systems      Genitourinary frequent urination and frequent urination at night   Cardiology none   Gastrointestinal heartburn/acid reflux   Neurology headaches and numbness/tingling of an extremity   Constitutional none   Integumentary none   Psychiatry none   Musculoskeletal back pain   Pulmonary none   ENT none   Endocrine none   Hematological none

## 2018-03-27 NOTE — PROGRESS NOTES
Follow-Up Note - Sleep Center   Shawn Grider  47 y o  male  :1965  OSK:[id number]    CC: I saw this patient for follow-up in clinic today for his Sleep Disordered Breathing, Coexisting Sleep and Medical Problems  The patient had both diagnostic and therapeutic sleep studies [and is here to review results and to initiate therapy]  The diagnostic [study] confirmed [severe] obstructive sleep apnea:  [AHI] 46/hour   Minimum oxygen saturation 89 %  [and 3 minutes of total sleep time was spent with saturations less than 90%  [Intermittent] snoring was noted  During the subsequent therapeutic study, sleep disordered breathing was [successfully] remediated with PAP at 8 cm H2O  There were mild periodic limb movements of sleep    Medications, Past, Family & Social History were reviewed  He  has a past medical history of Hyperlipidemia  He has a current medication list which includes the following prescription(s): gabapentin, methocarbamol, and naproxen  ROS: reviewed, see attached   HPI:  With respect to positive airway pressure therapy (PAP) compliance data download shows: [ he is using PAP > 4 hours per night 18 5% of the time and AHI is 32/hour at [90th percentile] pressure of 8cm H2O ]   Delmar Hirsch reports:   -  significant difficulty tolerating PAP; [ dry mouth and mask discomfort]  -  benefiting from use ; [sleeping better]   -  he is no longer on pain medications  -  he has lower extremity discomfort but no typical restless leg symptoms and is not aware of jerking movements during sleep    Sleep Routine:  Delmar Hirsch reports getting 6-7 hours sleep; he has no difficulty initiating or maintaining sleep   He awakens spontaneously feeling refreshed He admits to much improved excessive drowsiness  [facility] rated himself at Total score: 12 /24 on the [place] sleepiness scale ]  But continues to take naps occasionally      EXAM: Vitals are stable: Blood pressure 110/62, pulse 72, height 5' 9" (1 753 m), weight 85 1 kg (187 lb 9 6 oz)  Body mass index is 27 7 kg/m²  Johana Cheng Neck Circumference: 46 cm  Patient is alert, orientated, cooperative [and in no distress]  Mental state [appears normal]  There are [no] facial pressure marks or rashes  [  ] Physical findings are essentially unchanged from previous  IMPRESSION:     1  TOM (obstructive sleep apnea)     2  Other insomnia     3  PLMD (periodic limb movement disorder)     4  Hypersomnia         PLAN:  1  I reviewed results of the Sleep studies with the patient  2  I discussed treatment options with risks and benefits  3  Treatment with  PAP is medically necessary and Ewa Francis is agreable to continue use  4  Pressure setting: [Continue at 8-14 cm H2O in auto titrating mode]   5  Instruction on care of equipment and strategies to improve comfort with use of PAP were discussed [:controlling mucosal dryness, nasal symptoms and Mask leaks]  6  Care coordinated with DME provider and prescription to replace supplies as needed was provided  He is due to have his mask refitted  7  Need for compliance with therapy and weight reduction were emphasized  8  I advise trying Biotene mouthwash or Xylimelt  9  No treatment is needed for the periodic limb movements of sleep  10  With your consent, follow-up is advised in 1 month [to monitor progress, compliance and to adjust therapy]  Thank you for allowing me to participate in the care of this patient      Sincerely,    Hitesh Garcias MD  Board Certified Specialist

## 2018-03-30 ENCOUNTER — HOSPITAL ENCOUNTER (OUTPATIENT)
Dept: SLEEP CENTER | Facility: CLINIC | Age: 53
Discharge: HOME | End: 2018-03-30

## 2019-12-11 ENCOUNTER — HOSPITAL ENCOUNTER (EMERGENCY)
Facility: HOSPITAL | Age: 54
Discharge: HOME | End: 2019-12-11
Attending: EMERGENCY MEDICINE | Admitting: EMERGENCY MEDICINE
Payer: COMMERCIAL

## 2019-12-11 VITALS
OXYGEN SATURATION: 98 % | DIASTOLIC BLOOD PRESSURE: 75 MMHG | WEIGHT: 198.63 LBS | HEART RATE: 67 BPM | TEMPERATURE: 98.1 F | RESPIRATION RATE: 18 BRPM | BODY MASS INDEX: 29.33 KG/M2 | SYSTOLIC BLOOD PRESSURE: 151 MMHG

## 2019-12-11 DIAGNOSIS — M54.41 ACUTE BILATERAL LOW BACK PAIN WITH BILATERAL SCIATICA: Primary | ICD-10-CM

## 2019-12-11 DIAGNOSIS — M54.42 ACUTE BILATERAL LOW BACK PAIN WITH BILATERAL SCIATICA: Primary | ICD-10-CM

## 2019-12-11 PROCEDURE — 99284 EMERGENCY DEPT VISIT MOD MDM: CPT | Performed by: PHYSICIAN ASSISTANT

## 2019-12-11 PROCEDURE — 99283 EMERGENCY DEPT VISIT LOW MDM: CPT

## 2019-12-11 PROCEDURE — 96372 THER/PROPH/DIAG INJ SC/IM: CPT

## 2019-12-11 RX ORDER — KETOROLAC TROMETHAMINE 30 MG/ML
15 INJECTION, SOLUTION INTRAMUSCULAR; INTRAVENOUS ONCE
Status: COMPLETED | OUTPATIENT
Start: 2019-12-11 | End: 2019-12-11

## 2019-12-11 RX ORDER — METHOCARBAMOL 500 MG/1
500 TABLET, FILM COATED ORAL 3 TIMES DAILY
Qty: 30 TABLET | Refills: 0 | Status: SHIPPED | OUTPATIENT
Start: 2019-12-11 | End: 2020-06-16 | Stop reason: SDUPTHER

## 2019-12-11 RX ORDER — NAPROXEN 500 MG/1
500 TABLET ORAL 2 TIMES DAILY WITH MEALS
Qty: 10 TABLET | Refills: 0 | Status: SHIPPED | OUTPATIENT
Start: 2019-12-11 | End: 2020-06-16 | Stop reason: SDUPTHER

## 2019-12-11 RX ORDER — LIDOCAINE 50 MG/G
1 PATCH TOPICAL ONCE
Status: DISCONTINUED | OUTPATIENT
Start: 2019-12-11 | End: 2019-12-11 | Stop reason: HOSPADM

## 2019-12-11 RX ADMIN — LIDOCAINE 1 PATCH: 50 PATCH TOPICAL at 17:54

## 2019-12-11 RX ADMIN — KETOROLAC TROMETHAMINE 15 MG: 30 INJECTION, SOLUTION INTRAMUSCULAR; INTRAVENOUS at 17:53

## 2020-06-15 ENCOUNTER — TELEPHONE (OUTPATIENT)
Dept: FAMILY MEDICINE CLINIC | Facility: CLINIC | Age: 55
End: 2020-06-15

## 2020-06-16 ENCOUNTER — OFFICE VISIT (OUTPATIENT)
Dept: FAMILY MEDICINE CLINIC | Facility: CLINIC | Age: 55
End: 2020-06-16

## 2020-06-16 VITALS
BODY MASS INDEX: 29.95 KG/M2 | SYSTOLIC BLOOD PRESSURE: 132 MMHG | TEMPERATURE: 98.2 F | DIASTOLIC BLOOD PRESSURE: 80 MMHG | HEART RATE: 69 BPM | OXYGEN SATURATION: 99 % | HEIGHT: 69 IN | RESPIRATION RATE: 20 BRPM | WEIGHT: 202.2 LBS

## 2020-06-16 DIAGNOSIS — Z00.00 ANNUAL PHYSICAL EXAM: Primary | ICD-10-CM

## 2020-06-16 DIAGNOSIS — M54.42 CHRONIC BILATERAL LOW BACK PAIN WITH BILATERAL SCIATICA: ICD-10-CM

## 2020-06-16 DIAGNOSIS — G89.29 CHRONIC BILATERAL LOW BACK PAIN WITH BILATERAL SCIATICA: ICD-10-CM

## 2020-06-16 DIAGNOSIS — E66.3 OVERWEIGHT: ICD-10-CM

## 2020-06-16 DIAGNOSIS — Z12.11 SCREENING FOR COLON CANCER: ICD-10-CM

## 2020-06-16 DIAGNOSIS — E78.5 HYPERLIPIDEMIA, UNSPECIFIED HYPERLIPIDEMIA TYPE: ICD-10-CM

## 2020-06-16 DIAGNOSIS — M54.41 CHRONIC BILATERAL LOW BACK PAIN WITH BILATERAL SCIATICA: ICD-10-CM

## 2020-06-16 DIAGNOSIS — Z12.5 SCREENING FOR PROSTATE CANCER: ICD-10-CM

## 2020-06-16 PROCEDURE — 99396 PREV VISIT EST AGE 40-64: CPT | Performed by: PHYSICIAN ASSISTANT

## 2020-06-16 RX ORDER — GABAPENTIN 300 MG/1
300 CAPSULE ORAL
Qty: 90 CAPSULE | Refills: 1 | Status: SHIPPED | OUTPATIENT
Start: 2020-06-16 | End: 2021-09-17

## 2020-06-16 RX ORDER — METHOCARBAMOL 500 MG/1
500 TABLET, FILM COATED ORAL 3 TIMES DAILY PRN
Qty: 90 TABLET | Refills: 2 | Status: SHIPPED | OUTPATIENT
Start: 2020-06-16 | End: 2021-09-17

## 2020-06-16 RX ORDER — NAPROXEN 500 MG/1
500 TABLET ORAL 2 TIMES DAILY WITH MEALS
Qty: 180 TABLET | Refills: 0 | Status: SHIPPED | OUTPATIENT
Start: 2020-06-16 | End: 2021-02-15

## 2020-06-22 ENCOUNTER — OFFICE VISIT (OUTPATIENT)
Dept: PAIN MEDICINE | Facility: CLINIC | Age: 55
End: 2020-06-22
Payer: COMMERCIAL

## 2020-06-22 VITALS
SYSTOLIC BLOOD PRESSURE: 150 MMHG | BODY MASS INDEX: 30.07 KG/M2 | TEMPERATURE: 99.2 F | HEIGHT: 69 IN | HEART RATE: 72 BPM | DIASTOLIC BLOOD PRESSURE: 96 MMHG | WEIGHT: 203 LBS

## 2020-06-22 DIAGNOSIS — M54.9 CHRONIC MID BACK PAIN: Primary | ICD-10-CM

## 2020-06-22 DIAGNOSIS — G89.29 CHRONIC MIDLINE THORACIC BACK PAIN: ICD-10-CM

## 2020-06-22 DIAGNOSIS — G89.29 CHRONIC BILATERAL LOW BACK PAIN, UNSPECIFIED WHETHER SCIATICA PRESENT: ICD-10-CM

## 2020-06-22 DIAGNOSIS — G89.29 CHRONIC MID BACK PAIN: Primary | ICD-10-CM

## 2020-06-22 DIAGNOSIS — M54.6 CHRONIC MIDLINE THORACIC BACK PAIN: ICD-10-CM

## 2020-06-22 DIAGNOSIS — M54.50 CHRONIC BILATERAL LOW BACK PAIN, UNSPECIFIED WHETHER SCIATICA PRESENT: ICD-10-CM

## 2020-06-22 PROCEDURE — 99203 OFFICE O/P NEW LOW 30 MIN: CPT | Performed by: PHYSICIAN ASSISTANT

## 2020-06-22 PROCEDURE — 3008F BODY MASS INDEX DOCD: CPT | Performed by: PHYSICIAN ASSISTANT

## 2020-06-22 PROCEDURE — 1036F TOBACCO NON-USER: CPT | Performed by: PHYSICIAN ASSISTANT

## 2020-06-25 ENCOUNTER — HOSPITAL ENCOUNTER (OUTPATIENT)
Dept: RADIOLOGY | Facility: HOSPITAL | Age: 55
Discharge: HOME | End: 2020-06-25
Payer: COMMERCIAL

## 2020-06-25 ENCOUNTER — APPOINTMENT (OUTPATIENT)
Dept: LAB | Facility: HOSPITAL | Age: 55
End: 2020-06-25
Payer: COMMERCIAL

## 2020-06-25 DIAGNOSIS — G89.29 CHRONIC BILATERAL LOW BACK PAIN, UNSPECIFIED WHETHER SCIATICA PRESENT: ICD-10-CM

## 2020-06-25 DIAGNOSIS — M54.6 CHRONIC MIDLINE THORACIC BACK PAIN: ICD-10-CM

## 2020-06-25 DIAGNOSIS — E78.5 HYPERLIPIDEMIA, UNSPECIFIED HYPERLIPIDEMIA TYPE: ICD-10-CM

## 2020-06-25 DIAGNOSIS — G89.29 CHRONIC MID BACK PAIN: ICD-10-CM

## 2020-06-25 DIAGNOSIS — M54.9 CHRONIC MID BACK PAIN: ICD-10-CM

## 2020-06-25 DIAGNOSIS — Z12.5 SCREENING FOR PROSTATE CANCER: ICD-10-CM

## 2020-06-25 DIAGNOSIS — G89.29 CHRONIC MIDLINE THORACIC BACK PAIN: ICD-10-CM

## 2020-06-25 DIAGNOSIS — M54.50 CHRONIC BILATERAL LOW BACK PAIN, UNSPECIFIED WHETHER SCIATICA PRESENT: ICD-10-CM

## 2020-06-25 LAB
ALBUMIN SERPL BCP-MCNC: 3.9 G/DL (ref 3.5–5)
ALP SERPL-CCNC: 75 U/L (ref 46–116)
ALT SERPL W P-5'-P-CCNC: 100 U/L (ref 12–78)
ANION GAP SERPL CALCULATED.3IONS-SCNC: 7 MMOL/L (ref 4–13)
AST SERPL W P-5'-P-CCNC: 43 U/L (ref 5–45)
BASOPHILS # BLD AUTO: 0.06 THOUSANDS/ΜL (ref 0–0.1)
BASOPHILS NFR BLD AUTO: 1 % (ref 0–1)
BILIRUB SERPL-MCNC: 0.47 MG/DL (ref 0.2–1)
BUN SERPL-MCNC: 21 MG/DL (ref 5–25)
CALCIUM SERPL-MCNC: 8.5 MG/DL (ref 8.3–10.1)
CHLORIDE SERPL-SCNC: 107 MMOL/L (ref 100–108)
CHOLEST SERPL-MCNC: 213 MG/DL (ref 50–200)
CO2 SERPL-SCNC: 26 MMOL/L (ref 21–32)
CREAT SERPL-MCNC: 1.31 MG/DL (ref 0.6–1.3)
EOSINOPHIL # BLD AUTO: 0.29 THOUSAND/ΜL (ref 0–0.61)
EOSINOPHIL NFR BLD AUTO: 5 % (ref 0–6)
ERYTHROCYTE [DISTWIDTH] IN BLOOD BY AUTOMATED COUNT: 14 % (ref 11.6–15.1)
GFR SERPL CREATININE-BSD FRML MDRD: 61 ML/MIN/1.73SQ M
GLUCOSE P FAST SERPL-MCNC: 126 MG/DL (ref 65–99)
HCT VFR BLD AUTO: 43.9 % (ref 36.5–49.3)
HDLC SERPL-MCNC: 37 MG/DL
HGB BLD-MCNC: 14.6 G/DL (ref 12–17)
IMM GRANULOCYTES # BLD AUTO: 0.02 THOUSAND/UL (ref 0–0.2)
IMM GRANULOCYTES NFR BLD AUTO: 0 % (ref 0–2)
LDLC SERPL CALC-MCNC: 157 MG/DL (ref 0–100)
LYMPHOCYTES # BLD AUTO: 2.62 THOUSANDS/ΜL (ref 0.6–4.47)
LYMPHOCYTES NFR BLD AUTO: 44 % (ref 14–44)
MCH RBC QN AUTO: 31.7 PG (ref 26.8–34.3)
MCHC RBC AUTO-ENTMCNC: 33.3 G/DL (ref 31.4–37.4)
MCV RBC AUTO: 95 FL (ref 82–98)
MONOCYTES # BLD AUTO: 0.4 THOUSAND/ΜL (ref 0.17–1.22)
MONOCYTES NFR BLD AUTO: 7 % (ref 4–12)
NEUTROPHILS # BLD AUTO: 2.62 THOUSANDS/ΜL (ref 1.85–7.62)
NEUTS SEG NFR BLD AUTO: 43 % (ref 43–75)
NONHDLC SERPL-MCNC: 176 MG/DL
NRBC BLD AUTO-RTO: 0 /100 WBCS
PLATELET # BLD AUTO: 186 THOUSANDS/UL (ref 149–390)
PMV BLD AUTO: 10.4 FL (ref 8.9–12.7)
POTASSIUM SERPL-SCNC: 4.1 MMOL/L (ref 3.5–5.3)
PROT SERPL-MCNC: 7.2 G/DL (ref 6.4–8.2)
RBC # BLD AUTO: 4.6 MILLION/UL (ref 3.88–5.62)
SODIUM SERPL-SCNC: 140 MMOL/L (ref 136–145)
TRIGL SERPL-MCNC: 95 MG/DL
WBC # BLD AUTO: 6.01 THOUSAND/UL (ref 4.31–10.16)

## 2020-06-25 PROCEDURE — 84153 ASSAY OF PSA TOTAL: CPT

## 2020-06-25 PROCEDURE — 36415 COLL VENOUS BLD VENIPUNCTURE: CPT

## 2020-06-25 PROCEDURE — 80061 LIPID PANEL: CPT

## 2020-06-25 PROCEDURE — 72072 X-RAY EXAM THORAC SPINE 3VWS: CPT

## 2020-06-25 PROCEDURE — 80053 COMPREHEN METABOLIC PANEL: CPT

## 2020-06-25 PROCEDURE — 85025 COMPLETE CBC W/AUTO DIFF WBC: CPT

## 2020-06-25 PROCEDURE — 72100 X-RAY EXAM L-S SPINE 2/3 VWS: CPT

## 2020-06-25 PROCEDURE — 84154 ASSAY OF PSA FREE: CPT

## 2020-06-27 LAB
PSA FREE MFR SERPL: 18.5 %
PSA FREE SERPL-MCNC: 0.24 NG/ML
PSA SERPL-MCNC: 1.3 NG/ML (ref 0–4)

## 2020-06-29 DIAGNOSIS — E78.5 HYPERLIPIDEMIA, UNSPECIFIED HYPERLIPIDEMIA TYPE: ICD-10-CM

## 2020-06-29 DIAGNOSIS — R73.09 ELEVATED GLUCOSE LEVEL: Primary | ICD-10-CM

## 2020-06-29 RX ORDER — ATORVASTATIN CALCIUM 10 MG/1
10 TABLET, FILM COATED ORAL DAILY
Qty: 90 TABLET | Refills: 1 | Status: SHIPPED | OUTPATIENT
Start: 2020-06-29 | End: 2021-02-26

## 2021-02-15 ENCOUNTER — OFFICE VISIT (OUTPATIENT)
Dept: FAMILY MEDICINE CLINIC | Facility: CLINIC | Age: 56
End: 2021-02-15

## 2021-02-15 VITALS
WEIGHT: 195 LBS | BODY MASS INDEX: 28.88 KG/M2 | HEART RATE: 69 BPM | OXYGEN SATURATION: 98 % | SYSTOLIC BLOOD PRESSURE: 132 MMHG | DIASTOLIC BLOOD PRESSURE: 78 MMHG | TEMPERATURE: 96.7 F | RESPIRATION RATE: 18 BRPM | HEIGHT: 69 IN

## 2021-02-15 DIAGNOSIS — R73.09 ABNORMAL BLOOD SUGAR: ICD-10-CM

## 2021-02-15 DIAGNOSIS — M77.11 LATERAL EPICONDYLITIS OF BOTH ELBOWS: ICD-10-CM

## 2021-02-15 DIAGNOSIS — M25.562 CHRONIC PAIN OF BOTH KNEES: Primary | ICD-10-CM

## 2021-02-15 DIAGNOSIS — Z11.4 SCREENING FOR HIV (HUMAN IMMUNODEFICIENCY VIRUS): ICD-10-CM

## 2021-02-15 DIAGNOSIS — G89.29 CHRONIC PAIN OF BOTH KNEES: Primary | ICD-10-CM

## 2021-02-15 DIAGNOSIS — M77.12 LATERAL EPICONDYLITIS OF BOTH ELBOWS: ICD-10-CM

## 2021-02-15 DIAGNOSIS — R79.89 ELEVATED LFTS: ICD-10-CM

## 2021-02-15 DIAGNOSIS — M25.561 CHRONIC PAIN OF BOTH KNEES: Primary | ICD-10-CM

## 2021-02-15 DIAGNOSIS — E53.8 LOW VITAMIN B12 LEVEL: ICD-10-CM

## 2021-02-15 PROCEDURE — 1036F TOBACCO NON-USER: CPT | Performed by: PHYSICIAN ASSISTANT

## 2021-02-15 PROCEDURE — 99214 OFFICE O/P EST MOD 30 MIN: CPT | Performed by: PHYSICIAN ASSISTANT

## 2021-02-15 PROCEDURE — 3008F BODY MASS INDEX DOCD: CPT | Performed by: PHYSICIAN ASSISTANT

## 2021-02-15 RX ORDER — DICLOFENAC SODIUM 75 MG/1
75 TABLET, DELAYED RELEASE ORAL 2 TIMES DAILY
Qty: 60 TABLET | Refills: 1 | Status: SHIPPED | OUTPATIENT
Start: 2021-02-15 | End: 2021-09-17

## 2021-02-15 NOTE — PATIENT INSTRUCTIONS
Ejercicios para el codo de [place]   LO QUE NECESITA SABER:   ¿Qué son los ejercicios para el codo de [place]? Los ejercicios para el codo de [place] a disminuir el dolor del codo, el [place], la [place] [address] y la mano  Tae Holts a fortalecer los músculos del [place] y prevenir más lesiones  Empiece con estos ejercicios lentamente  Marcio los ejercicios con ambos brazos  Deténgase si siente dolor  · Extensión de los dedos: Mantenga los dedos juntos  Coloque kwesi goma elástica alrededor [employer] dedos y pulgar  Separe los dedos y luego júntelos lentamente sin permitir que la chaparro se suelte  Repita 40 veces  · Estiramiento del flexor de la porsche: Sostenga el brazo estirado en frente de usted con la kathleen hacia abajo  Use la otra mano para agarrar los dedos  Valentine Haymaker codo recto y doble lentamente la mano hacia atrás  Las yemas de los dedos deberían apuntar Arlys Mealy arriba y la kathleen debería apuntar al lado contrario de usted  Marcio esto hasta que sienta que se estira la parte superior de sheldon [place] [address]  Sostenga está posición por 10 segundos  Repita 5 veces  · Estiramiento del extensor de la porsche: Lissa estiramiento es lo opuesto al estiramiento del flexor de la porsche  Sostenga el brazo estirado en frente de usted con la kathleen hacia abajo  Use la otra mano para agarrar los dedos  Mantenga el codo recto y doble lentamente la mano hacia abajo  Las yemas de los dedos deberían apuntar hacia abajo y la kathleen debería apuntar hacia usted  Marcio esto hasta que sienta el estiramiento de la [place] [address]  Sostenga está posición por 10 segundos  Repita 5 veces  · Flexión del bíceps: Coloque la mano debajo del codo lesionado yisel soporte  Voltee la kathleen para que apunte [place] y sostenga kwesi pesa en la mano  Consulte con sheldon médico cuánto peso debería usar  Laurance Jean-Claude y estire el codo 30 veces  · Agarre: Sostenga kwesi pelota de goma suave o kwesi pelota de tenis en la [place]   Exprima la pelota tan heather yisel pueda y [place] posición  Pregúntele a sheldon médico por cuánto tiempo tiene que [employer]  Repita estos pasos yisel se lo haya indicado sheldon médico     ¿Cuándo davidson comunicarme con mi médico?  · El dolor o la debilidad en el brazo, la [place] [address], la mano o los dedos [place]  · Usted tiene entumecimiento u hormigueo [employer] brazo, [place] [address], mano o dedos  · Tiene preguntas o inquietudes acerca de los ejercicios para el codo de [place]  ACUERDOS SOBRE SHELDON CUIDADO:   Usted tiene el derecho de ayudar a planear sheldon cuidado  Aprenda todo lo que pueda sobre sheldon condición y yisel darle tratamiento  Discuta fiorella opciones de tratamiento con fiorella médicos para decidir el cuidado que usted desea recibir  Usted siempre tiene el derecho de rechazar el tratamiento  Esta información es sólo para uso en educación  Sheldon intención no es darle un consejo médico sobre enfermedades o tratamientos  Colsulte con sheldon [place] farmacéutico antes de seguir cualquier régimen médico para saber si es seguro y efectivo para usted  © Copyright [address] Information is for End User's use only and may not be sold, redistributed or otherwise used for commercial purposes   All illustrations and images included in CareNotes® are the copyrighted property of A D A M , Inc  or [address] [place] [address] [address]

## 2021-02-15 NOTE — PROGRESS NOTES
Assessment/Plan:    Chronic pain of both knees  To switch naproxen to meloxicam   X-rays ordered  Lateral epicondylitis of both elbows  Home exercises printed and recommended         Problem List Items Addressed This Visit        Musculoskeletal and Integument    Lateral epicondylitis of both elbows     Home exercises printed and recommended            Other    Chronic pain of both knees - Primary     To switch naproxen to meloxicam   X-rays ordered  Relevant Medications    diclofenac (VOLTAREN) 75 mg EC tablet    Other Relevant Orders    XR knee 3 vw left non injury    XR knee 3 vw right non injury      Other Visit Diagnoses     Elevated LFTs        Relevant Orders    CBC and differential    Comprehensive metabolic panel    Lipid panel    Chronic Hepatitis Panel    Abnormal blood sugar        Relevant Orders    CBC and differential    Comprehensive metabolic panel    Hemoglobin A1c (w/out EAG)    Low vitamin B12 level        Relevant Orders    Vitamin B12    Screening for HIV (human immunodeficiency virus)        Relevant Orders    HIV 1/2 Antigen/Antibody (4th Generation) w Reflex [facility]            Subjective:      Patient ID: Brittney Knott is a 48 y o  male  HPI status  daljit [id number]     64-year-old male here for pain in his arms and shoulders  He does have a history of periodic limb movement disorder  He does take gabapentin but he takes it sporadically  He does take naproxen for his upper back and that does help  He has been having pain in both his upper and lower extremities for a long time  The naproxen gabapentin do not help for this     He has had pain in both UE for last 5 years but it has been worse for 6 months  He has not done PT  His pain is very intense so he has not gone to therapy because he feels like it is going to make the pain worse     Pain in the upper extremities is usually by his elbows and sometimes radiates up to the biceps      He is also having Pain in his knees  He is getting pain in both knees  He has a hard time getting up when sitting  Sometimes knees will swell  He uses ice and wraps it  He has had pain for 3 years but last 6 months has  Been worse  He does get pain in his lower back that shoots into his legs  He also had an abnormal blood sugar back in June  It was recommended that he recheck hemoglobin A1c level and also had elevated liver function test at this time  He states he was unaware of this  The following portions of the patient's history were reviewed and updated as appropriate:   He  has a past medical history of Hyperlipidemia  He   Patient Active Problem List    Diagnosis Date Noted    Chronic pain of both knees 02/15/2021    Lateral epicondylitis of both elbows 02/15/2021    TOM (obstructive sleep apnea) 03/27/2018    PLMD (periodic limb movement disorder) 03/27/2018    Other insomnia 03/27/2018    Hypersomnia 03/27/2018    Overweight (BMI 25 0-29 9) 03/27/2018    GERD with apnea 03/27/2018    Erectile dysfunction 05/11/2017    Restless leg syndrome 01/19/2017    Hyperlipidemia 01/19/2017    Back pain 01/19/2017     He  has no past surgical history on file  His family history is not on file  He  reports that he has quit smoking  His smoking use included cigarettes  He has never used smokeless tobacco  He reports that he does not drink alcohol or use drugs    Current Outpatient Medications   Medication Sig Dispense Refill    atorvastatin (LIPITOR) 10 mg tablet Take 1 tablet (10 mg total) by mouth daily 90 tablet 1    gabapentin (NEURONTIN) 300 mg capsule Take 1 capsule (300 mg total) by mouth daily at bedtime 90 capsule 1    diclofenac (VOLTAREN) 75 mg EC tablet Take 1 tablet (75 mg total) by mouth 2 (two) times a day 60 tablet 1    diclofenac sodium (VOLTAREN) 1 % Apply 2 g topically 4 (four) times a day for 7 days (Patient not taking: Reported on 6/22/2020) 56 g 0    methocarbamol (ROBAXIN) 500 mg tablet Take 1 tablet (500 mg total) by mouth 3 (three) times a day as needed for muscle spasms (Patient not taking: Reported on 6/22/2020) 90 tablet 2     No current facility-administered medications for this visit  Current Outpatient Medications on File Prior to Visit   Medication Sig    atorvastatin (LIPITOR) 10 mg tablet Take 1 tablet (10 mg total) by mouth daily    gabapentin (NEURONTIN) 300 mg capsule Take 1 capsule (300 mg total) by mouth daily at bedtime    [DISCONTINUED] naproxen (NAPROSYN) 500 mg tablet Take 1 tablet (500 mg total) by mouth 2 (two) times a day with meals    diclofenac sodium (VOLTAREN) 1 % Apply 2 g topically 4 (four) times a day for 7 days (Patient not taking: Reported on 6/22/2020)    methocarbamol (ROBAXIN) 500 mg tablet Take 1 tablet (500 mg total) by mouth 3 (three) times a day as needed for muscle spasms (Patient not taking: Reported on 6/22/2020)     No current facility-administered medications on file prior to visit  He has No Known Allergies       Review of Systems   Constitutional: Negative for activity change, appetite change, fatigue, fever and unexpected weight change  HENT: Negative for dental problem, ear pain, hearing loss and sore throat  Eyes: Negative for visual disturbance  Respiratory: Negative for cough and wheezing  Gastrointestinal: Negative for abdominal pain, constipation, diarrhea and vomiting  Genitourinary: Negative for difficulty urinating and dysuria  Musculoskeletal: Positive for arthralgias, back pain, gait problem, joint swelling, neck pain and neck stiffness  Negative for myalgias  Skin: Negative for rash  Neurological: Negative for dizziness and headaches  Psychiatric/Behavioral: Negative for behavioral problems  The patient is not nervous/anxious            Objective:      /78 (BP Location: Left arm, Patient Position: Sitting, Cuff Size: Standard)   Pulse 69   Temp (!) 96 7 °F (35 9 °C) (Temporal)   Resp 18   Ht 5' 9" (1 753 m)   Wt 88 5 kg (195 lb)   SpO2 98%   BMI 28 80 kg/m²          Physical Exam  Vitals signs and nursing note reviewed  Constitutional:       General: He is not in acute distress  Appearance: He is well-developed  HENT:      Head: Normocephalic and atraumatic  Right Ear: External ear normal       Left Ear: External ear normal    Eyes:      Conjunctiva/sclera: Conjunctivae normal    Neck:      Musculoskeletal: Normal range of motion and neck supple  Thyroid: No thyromegaly  Cardiovascular:      Rate and Rhythm: Normal rate and regular rhythm  Heart sounds: Normal heart sounds  No murmur  Pulmonary:      Effort: Pulmonary effort is normal  No respiratory distress  Breath sounds: Normal breath sounds  No wheezing  Musculoskeletal:         General: Tenderness (Thoracic spine T2-T4, mild tenderness over the left knee patella  Tender over both lateral epicondyles) present  No swelling or deformity  Comments: Decreased range of motion cervical spine   Lymphadenopathy:      Cervical: No cervical adenopathy  Neurological:      Mental Status: He is alert and oriented to person, place, and time     Psychiatric:         Mood and Affect: Mood normal          Behavior: Behavior normal

## 2021-02-25 ENCOUNTER — HOSPITAL ENCOUNTER (OUTPATIENT)
Dept: RADIOLOGY | Facility: HOSPITAL | Age: 56
Discharge: HOME | End: 2021-02-25
Payer: COMMERCIAL

## 2021-02-25 ENCOUNTER — LAB (OUTPATIENT)
Dept: LAB | Facility: HOSPITAL | Age: 56
End: 2021-02-25
Payer: COMMERCIAL

## 2021-02-25 DIAGNOSIS — R73.09 ABNORMAL BLOOD SUGAR: ICD-10-CM

## 2021-02-25 DIAGNOSIS — E53.8 LOW VITAMIN B12 LEVEL: ICD-10-CM

## 2021-02-25 DIAGNOSIS — M25.561 CHRONIC PAIN OF BOTH KNEES: ICD-10-CM

## 2021-02-25 DIAGNOSIS — Z11.4 SCREENING FOR HIV (HUMAN IMMUNODEFICIENCY VIRUS): ICD-10-CM

## 2021-02-25 DIAGNOSIS — R79.89 ELEVATED LFTS: ICD-10-CM

## 2021-02-25 DIAGNOSIS — M25.562 CHRONIC PAIN OF BOTH KNEES: ICD-10-CM

## 2021-02-25 DIAGNOSIS — G89.29 CHRONIC PAIN OF BOTH KNEES: ICD-10-CM

## 2021-02-25 LAB
ALBUMIN SERPL BCP-MCNC: 3.7 G/DL (ref 3.5–5)
ALP SERPL-CCNC: 95 U/L (ref 46–116)
ALT SERPL W P-5'-P-CCNC: 60 U/L (ref 12–78)
ANION GAP SERPL CALCULATED.3IONS-SCNC: 7 MMOL/L (ref 4–13)
AST SERPL W P-5'-P-CCNC: 31 U/L (ref 5–45)
BASOPHILS # BLD AUTO: 0.05 THOUSANDS/ΜL (ref 0–0.1)
BASOPHILS NFR BLD AUTO: 1 % (ref 0–1)
BILIRUB SERPL-MCNC: 0.28 MG/DL (ref 0.2–1)
BUN SERPL-MCNC: 20 MG/DL (ref 5–25)
CALCIUM SERPL-MCNC: 8.8 MG/DL (ref 8.3–10.1)
CHLORIDE SERPL-SCNC: 107 MMOL/L (ref 100–108)
CHOLEST SERPL-MCNC: 218 MG/DL (ref 50–200)
CO2 SERPL-SCNC: 26 MMOL/L (ref 21–32)
CREAT SERPL-MCNC: 1.27 MG/DL (ref 0.6–1.3)
EOSINOPHIL # BLD AUTO: 0.25 THOUSAND/ΜL (ref 0–0.61)
EOSINOPHIL NFR BLD AUTO: 4 % (ref 0–6)
ERYTHROCYTE [DISTWIDTH] IN BLOOD BY AUTOMATED COUNT: 13.5 % (ref 11.6–15.1)
GFR SERPL CREATININE-BSD FRML MDRD: 63 ML/MIN/1.73SQ M
GLUCOSE P FAST SERPL-MCNC: 123 MG/DL (ref 65–99)
HBV CORE AB SER QL: NORMAL
HBV CORE IGM SER QL: NORMAL
HBV SURFACE AG SER QL: NORMAL
HCT VFR BLD AUTO: 43.3 % (ref 36.5–49.3)
HCV AB SER QL: NORMAL
HDLC SERPL-MCNC: 40 MG/DL
HGB BLD-MCNC: 14.4 G/DL (ref 12–17)
IMM GRANULOCYTES # BLD AUTO: 0.02 THOUSAND/UL (ref 0–0.2)
IMM GRANULOCYTES NFR BLD AUTO: 0 % (ref 0–2)
LDLC SERPL CALC-MCNC: 158 MG/DL (ref 0–100)
LYMPHOCYTES # BLD AUTO: 2.37 THOUSANDS/ΜL (ref 0.6–4.47)
LYMPHOCYTES NFR BLD AUTO: 37 % (ref 14–44)
MCH RBC QN AUTO: 31.7 PG (ref 26.8–34.3)
MCHC RBC AUTO-ENTMCNC: 33.3 G/DL (ref 31.4–37.4)
MCV RBC AUTO: 95 FL (ref 82–98)
MONOCYTES # BLD AUTO: 0.43 THOUSAND/ΜL (ref 0.17–1.22)
MONOCYTES NFR BLD AUTO: 7 % (ref 4–12)
NEUTROPHILS # BLD AUTO: 3.22 THOUSANDS/ΜL (ref 1.85–7.62)
NEUTS SEG NFR BLD AUTO: 51 % (ref 43–75)
NONHDLC SERPL-MCNC: 178 MG/DL
NRBC BLD AUTO-RTO: 0 /100 WBCS
PLATELET # BLD AUTO: 210 THOUSANDS/UL (ref 149–390)
PMV BLD AUTO: 10.4 FL (ref 8.9–12.7)
POTASSIUM SERPL-SCNC: 3.8 MMOL/L (ref 3.5–5.3)
PROT SERPL-MCNC: 7.4 G/DL (ref 6.4–8.2)
RBC # BLD AUTO: 4.54 MILLION/UL (ref 3.88–5.62)
SODIUM SERPL-SCNC: 140 MMOL/L (ref 136–145)
TRIGL SERPL-MCNC: 98 MG/DL
VIT B12 SERPL-MCNC: 205 PG/ML (ref 100–900)
WBC # BLD AUTO: 6.34 THOUSAND/UL (ref 4.31–10.16)

## 2021-02-25 PROCEDURE — 86803 HEPATITIS C AB TEST: CPT

## 2021-02-25 PROCEDURE — 86705 HEP B CORE ANTIBODY IGM: CPT

## 2021-02-25 PROCEDURE — 86704 HEP B CORE ANTIBODY TOTAL: CPT

## 2021-02-25 PROCEDURE — 36415 COLL VENOUS BLD VENIPUNCTURE: CPT

## 2021-02-25 PROCEDURE — 85025 COMPLETE CBC W/AUTO DIFF WBC: CPT

## 2021-02-25 PROCEDURE — 80053 COMPREHEN METABOLIC PANEL: CPT

## 2021-02-25 PROCEDURE — 87389 HIV-1 AG W/HIV-1&-2 AB AG IA: CPT

## 2021-02-25 PROCEDURE — 87340 HEPATITIS B SURFACE AG IA: CPT

## 2021-02-25 PROCEDURE — 80061 LIPID PANEL: CPT

## 2021-02-25 PROCEDURE — 73562 X-RAY EXAM OF KNEE 3: CPT

## 2021-02-25 PROCEDURE — 83036 HEMOGLOBIN GLYCOSYLATED A1C: CPT

## 2021-02-25 PROCEDURE — 82607 VITAMIN B-12: CPT

## 2021-02-26 DIAGNOSIS — E53.8 LOW SERUM VITAMIN B12: ICD-10-CM

## 2021-02-26 DIAGNOSIS — E78.5 HYPERLIPIDEMIA, UNSPECIFIED HYPERLIPIDEMIA TYPE: Primary | ICD-10-CM

## 2021-02-26 LAB
EST. AVERAGE GLUCOSE BLD GHB EST-MCNC: 114 MG/DL
HBA1C MFR BLD: 5.6 %
HIV 1+2 AB+HIV1 P24 AG SERPL QL IA: NORMAL

## 2021-02-26 RX ORDER — LANOLIN ALCOHOL/MO/W.PET/CERES
1000 CREAM (GRAM) TOPICAL DAILY
Qty: 90 TABLET | Refills: 1 | Status: SHIPPED | OUTPATIENT
Start: 2021-02-26 | End: 2022-01-20 | Stop reason: SDDI

## 2021-02-26 RX ORDER — ATORVASTATIN CALCIUM 40 MG/1
40 TABLET, FILM COATED ORAL DAILY
Qty: 90 TABLET | Refills: 1 | Status: SHIPPED | OUTPATIENT
Start: 2021-02-26 | End: 2022-01-20 | Stop reason: SDDI

## 2021-02-26 NOTE — RESULT ENCOUNTER NOTE
Hemoglobin A1c came back as normal   He does not have diabetes but he should still try to eat a low sugar and low carbohydrate diet

## 2021-02-26 NOTE — RESULT ENCOUNTER NOTE
His cholesterol was high still so we need to increase his atorvastatin to 40 mg  Also his booger was on the higher side  Under 125 is diabetes and he was 123  I would recommend that we do a hemoglobin A-1 C so Im going to put in a new order   Also his B12 was still lower than it should be so I recommend 1000 mg of B 12 daily

## 2021-09-17 ENCOUNTER — OFFICE VISIT (OUTPATIENT)
Dept: FAMILY MEDICINE CLINIC | Facility: CLINIC | Age: 56
End: 2021-09-17

## 2021-09-17 VITALS
BODY MASS INDEX: 30.07 KG/M2 | WEIGHT: 203 LBS | OXYGEN SATURATION: 97 % | TEMPERATURE: 97 F | HEART RATE: 74 BPM | DIASTOLIC BLOOD PRESSURE: 80 MMHG | HEIGHT: 69 IN | SYSTOLIC BLOOD PRESSURE: 150 MMHG | RESPIRATION RATE: 16 BRPM

## 2021-09-17 DIAGNOSIS — M25.50 POLYARTHRALGIA: Primary | ICD-10-CM

## 2021-09-17 DIAGNOSIS — G89.29 CHRONIC BILATERAL LOW BACK PAIN WITHOUT SCIATICA: ICD-10-CM

## 2021-09-17 DIAGNOSIS — M54.50 CHRONIC BILATERAL LOW BACK PAIN WITHOUT SCIATICA: ICD-10-CM

## 2021-09-17 DIAGNOSIS — E53.8 B12 DEFICIENCY: ICD-10-CM

## 2021-09-17 PROCEDURE — 99214 OFFICE O/P EST MOD 30 MIN: CPT | Performed by: PHYSICIAN ASSISTANT

## 2021-09-17 PROCEDURE — 96372 THER/PROPH/DIAG INJ SC/IM: CPT | Performed by: PHYSICIAN ASSISTANT

## 2021-09-17 RX ORDER — CYANOCOBALAMIN 1000 UG/ML
1000 INJECTION INTRAMUSCULAR; SUBCUTANEOUS DAILY
Status: DISCONTINUED | OUTPATIENT
Start: 2021-09-17 | End: 2022-01-20

## 2021-09-17 RX ORDER — PREDNISONE 10 MG/1
TABLET ORAL
Qty: 30 TABLET | Refills: 0 | Status: SHIPPED | OUTPATIENT
Start: 2021-09-17 | End: 2022-01-20 | Stop reason: SDDI

## 2021-09-17 RX ORDER — TIZANIDINE 2 MG/1
2 TABLET ORAL EVERY 8 HOURS PRN
Qty: 30 TABLET | Refills: 0 | Status: SHIPPED | OUTPATIENT
Start: 2021-09-17 | End: 2022-01-20 | Stop reason: SDDI

## 2021-09-17 RX ADMIN — CYANOCOBALAMIN 1000 MCG: 1000 INJECTION INTRAMUSCULAR; SUBCUTANEOUS at 11:33

## 2021-09-17 NOTE — ASSESSMENT & PLAN NOTE
Discussed that further testing can be ordered with completion of PT if no change  He wanted refill of tramadol today but based on xrays there was no indication and he has not tried many different options

## 2021-09-17 NOTE — PROGRESS NOTES
Assessment/Plan:    Chronic pain of both knees  xrays were normal prior  Recommend PT and also recommend additional labs for rheum causes    Back pain  Discussed that further testing can be ordered with completion of PT if no change  He wanted refill of tramadol today but based on xrays there was no indication and he has not tried many different options  Problem List Items Addressed This Visit        Other    Back pain     Discussed that further testing can be ordered with completion of PT if no change  He wanted refill of tramadol today but based on xrays there was no indication and he has not tried many different options  Relevant Orders    Ambulatory referral to Physical Therapy      Other Visit Diagnoses     Polyarthralgia    -  Primary    Relevant Medications    tiZANidine (ZANAFLEX) 2 mg tablet    predniSONE 10 mg tablet    Other Relevant Orders    CBC and differential    Lyme Antibody Profile with reflex to WB    RF Screen w/ Reflex to Titer    C-reactive protein    ALVIN Screen w/ Reflex to Titer/Pattern    Ambulatory referral to Physical Therapy    Celiac Disease Comprehensive Panel    B12 deficiency        Relevant Medications    cyanocobalamin injection 1,000 mcg    Other Relevant Orders    Celiac Disease Comprehensive Panel        follow up in 1 month  Subjective:      Patient ID: Gaby Coto is a 55 y o  male  HPI  55 year M here for joint pains and back pain  He has had back pain for years  He has a history of disc problems  He has never done PT for this but would be willing to  He did see PM and they recommended PT  He was on tramadol which he says was stopped at last visit, but records from here do not show it was rx in the past at all and he cant recall who the prescriber was  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  He is also having Pain in shoulders, elbows, risks and knees  She tried meloxicam and it didn't help  Gabapentin made him feel sick  He has been having RLS  He does have intermittent sleep but is not concerned with this and is more concerned with pain  The following portions of the patient's history were reviewed and updated as appropriate: He  has a past medical history of Hyperlipidemia  He   Patient Active Problem List    Diagnosis Date Noted    Chronic pain of both knees 02/15/2021    Lateral epicondylitis of both elbows 02/15/2021    TOM (obstructive sleep apnea) 03/27/2018    PLMD (periodic limb movement disorder) 03/27/2018    Other insomnia 03/27/2018    Hypersomnia 03/27/2018    Overweight (BMI 25 0-29 9) 03/27/2018    GERD with apnea 03/27/2018    Erectile dysfunction 05/11/2017    Restless leg syndrome 01/19/2017    Hyperlipidemia 01/19/2017    Back pain 01/19/2017     He  has no past surgical history on file  His family history is not on file  He  reports that he has quit smoking  His smoking use included cigarettes  He has never used smokeless tobacco  He reports that he does not drink alcohol and does not use drugs    Current Outpatient Medications   Medication Sig Dispense Refill    atorvastatin (LIPITOR) 40 mg tablet Take 1 tablet (40 mg total) by mouth daily (Patient not taking: Reported on 9/17/2021) 90 tablet 1    predniSONE 10 mg tablet Take 4 pills for 4 day, 3 pills for 3 days, 2 pills for 2 days and 1 pill on last day 30 tablet 0    tiZANidine (ZANAFLEX) 2 mg tablet Take 1 tablet (2 mg total) by mouth every 8 (eight) hours as needed for muscle spasms 30 tablet 0    vitamin B-12 (VITAMIN B-12) 1,000 mcg tablet Take 1 tablet (1,000 mcg total) by mouth daily (Patient not taking: Reported on 9/17/2021) 90 tablet 1     Current Facility-Administered Medications   Medication Dose Route Frequency Provider Last Rate Last Admin    cyanocobalamin injection 1,000 mcg  1,000 mcg Intramuscular Daily Kye Conde PA-C   1,000 mcg at 09/17/21 1133     Current Outpatient Medications on File Prior to Visit   Medication Sig    atorvastatin (LIPITOR) 40 mg tablet Take 1 tablet (40 mg total) by mouth daily (Patient not taking: Reported on 9/17/2021)    vitamin B-12 (VITAMIN B-12) 1,000 mcg tablet Take 1 tablet (1,000 mcg total) by mouth daily (Patient not taking: Reported on 9/17/2021)    [DISCONTINUED] diclofenac (VOLTAREN) 75 mg EC tablet Take 1 tablet (75 mg total) by mouth 2 (two) times a day (Patient not taking: Reported on 9/17/2021)    [DISCONTINUED] diclofenac sodium (VOLTAREN) 1 % Apply 2 g topically 4 (four) times a day for 7 days (Patient not taking: Reported on 6/22/2020)    [DISCONTINUED] gabapentin (NEURONTIN) 300 mg capsule Take 1 capsule (300 mg total) by mouth daily at bedtime (Patient not taking: Reported on 9/17/2021)    [DISCONTINUED] methocarbamol (ROBAXIN) 500 mg tablet Take 1 tablet (500 mg total) by mouth 3 (three) times a day as needed for muscle spasms (Patient not taking: Reported on 6/22/2020)     No current facility-administered medications on file prior to visit  He has No Known Allergies       Review of Systems   Constitutional: Positive for fatigue  Negative for activity change, appetite change, fever and unexpected weight change  HENT: Negative for dental problem, ear pain, hearing loss and sore throat  Eyes: Negative for visual disturbance  Respiratory: Negative for cough and wheezing  Gastrointestinal: Negative for abdominal pain, constipation, diarrhea and vomiting  Genitourinary: Negative for difficulty urinating and dysuria  Musculoskeletal: Positive for arthralgias, back pain, myalgias, neck pain and neck stiffness  Skin: Negative for rash  Neurological: Positive for headaches  Negative for dizziness  Psychiatric/Behavioral: Positive for sleep disturbance  Negative for behavioral problems           Objective:      /80 (BP Location: Left arm, Patient Position: Sitting, Cuff Size: Standard)   Pulse 74   Temp (!) 97 °F (36 1 °C) (Temporal)   Resp 16   Ht 5' 9" (1 753 m)   Wt 92 1 kg (203 lb)   SpO2 97%   BMI 29 98 kg/m²          Physical Exam  Vitals and nursing note reviewed  Constitutional:       General: He is not in acute distress  Appearance: Normal appearance  He is well-developed  HENT:      Head: Normocephalic and atraumatic  Right Ear: External ear normal       Left Ear: External ear normal    Eyes:      Conjunctiva/sclera: Conjunctivae normal    Neck:      Thyroid: No thyromegaly  Cardiovascular:      Rate and Rhythm: Normal rate and regular rhythm  Heart sounds: Normal heart sounds  No murmur heard  Pulmonary:      Effort: Pulmonary effort is normal  No respiratory distress  Breath sounds: Normal breath sounds  No wheezing  Musculoskeletal:         General: Tenderness (thoracic and lumbar spine diffusely) present  No swelling or deformity  Cervical back: Normal range of motion and neck supple  Lymphadenopathy:      Cervical: No cervical adenopathy  Neurological:      Mental Status: He is alert and oriented to person, place, and time     Psychiatric:         Mood and Affect: Mood normal          Behavior: Behavior normal

## 2021-11-16 ENCOUNTER — NURSE TRIAGE (OUTPATIENT)
Dept: OTHER | Facility: OTHER | Age: 56
End: 2021-11-16

## 2021-11-16 DIAGNOSIS — Z20.822 ENCOUNTER FOR SCREENING LABORATORY TESTING FOR COVID-19 VIRUS: Primary | ICD-10-CM

## 2021-11-16 PROCEDURE — U0005 INFEC AGEN DETEC AMPLI PROBE: HCPCS | Performed by: PHYSICIAN ASSISTANT

## 2021-11-16 PROCEDURE — U0003 INFECTIOUS AGENT DETECTION BY NUCLEIC ACID (DNA OR RNA); SEVERE ACUTE RESPIRATORY SYNDROME CORONAVIRUS 2 (SARS-COV-2) (CORONAVIRUS DISEASE [COVID-19]), AMPLIFIED PROBE TECHNIQUE, MAKING USE OF HIGH THROUGHPUT TECHNOLOGIES AS DESCRIBED BY CMS-2020-01-R: HCPCS | Performed by: PHYSICIAN ASSISTANT

## 2021-12-31 ENCOUNTER — HOSPITAL ENCOUNTER (EMERGENCY)
Facility: HOSPITAL | Age: 56
Discharge: HOME | End: 2021-12-31
Attending: EMERGENCY MEDICINE | Admitting: EMERGENCY MEDICINE
Payer: COMMERCIAL

## 2021-12-31 VITALS
WEIGHT: 198.41 LBS | BODY MASS INDEX: 29.3 KG/M2 | DIASTOLIC BLOOD PRESSURE: 77 MMHG | SYSTOLIC BLOOD PRESSURE: 129 MMHG | RESPIRATION RATE: 18 BRPM | HEART RATE: 84 BPM | OXYGEN SATURATION: 97 % | TEMPERATURE: 98.4 F

## 2021-12-31 DIAGNOSIS — B34.9 ACUTE VIRAL SYNDROME: Primary | ICD-10-CM

## 2021-12-31 DIAGNOSIS — Z20.822 PERSON UNDER INVESTIGATION FOR COVID-19: ICD-10-CM

## 2021-12-31 PROCEDURE — 87636 SARSCOV2 & INF A&B AMP PRB: CPT | Performed by: PHYSICIAN ASSISTANT

## 2021-12-31 PROCEDURE — 99283 EMERGENCY DEPT VISIT LOW MDM: CPT

## 2021-12-31 PROCEDURE — 99284 EMERGENCY DEPT VISIT MOD MDM: CPT | Performed by: PHYSICIAN ASSISTANT

## 2021-12-31 NOTE — DISCHARGE INSTRUCTIONS
DISCHARGE INSTRUCTIONS:    FOLLOW UP WITH YOUR PRIMARY CARE PROVIDER OR THE [address] [place] [address]  MAKE AN APPOINTMENT TO BE SEEN  TAKE TYLENOL OR MOTRIN FOR PAIN OR FEVER  REST AND DRINK PLENTY OF FLUIDS  IF SYMPTOMS WORSEN OR NEW SYMPTOMS ARISE, RETURN TO THE ER TO BE SEEN

## 2021-12-31 NOTE — ED PROVIDER NOTES
History  Chief Complaint   Patient presents with    Flu Symptoms     Pt c/o headache, cough, SOB, and body aches for 4 days  Wife has sililar symptoms     56y  o male with PMH of HLD presents to the ER for headache, cough, body aches, fever and diarrhea for 5 days  He has been taking Tylenol, Motrin, NyQuil and DayQuil for symptoms  Symptoms are constant  He has positive sick contacts  He denies recent travel  He denies chills, sore throat, chest pain, dyspnea, nausea, vomiting, abdominal pain, weakness or paresthesias  History provided by:  Patient   used: No        Prior to Admission Medications   Prescriptions Last Dose Informant Patient Reported? Taking?   atorvastatin (LIPITOR) 40 mg tablet   No No   Sig: Take 1 tablet (40 mg total) by mouth daily   Patient not taking: Reported on 9/17/2021   predniSONE 10 mg tablet   No No   Sig: Take 4 pills for 4 day, 3 pills for 3 days, 2 pills for 2 days and 1 pill on last day   tiZANidine (ZANAFLEX) 2 mg tablet   No No   Sig: Take 1 tablet (2 mg total) by mouth every 8 (eight) hours as needed for muscle spasms   vitamin B-12 (VITAMIN B-12) 1,000 mcg tablet   No No   Sig: Take 1 tablet (1,000 mcg total) by mouth daily   Patient not taking: Reported on 9/17/2021      Facility-Administered Medications Last Administration Doses Remaining   cyanocobalamin injection 1,000 mcg 9/17/2021 11:33 AM           Past Medical History:   Diagnosis Date    Hyperlipidemia        History reviewed  No pertinent surgical history  History reviewed  No pertinent family history  I have reviewed and agree with the history as documented      E-Cigarette/Vaping     E-Cigarette/Vaping Substances     Social History     Tobacco Use    Smoking status: Former Smoker     Types: Cigarettes    Smokeless tobacco: Never Used   Substance Use Topics    Alcohol use: No     Alcohol/week: 1 0 standard drink     Types: 1 Cans of beer per week    Drug use: No       Review of Systems   Constitutional: Positive for fever  Negative for activity change, appetite change and chills  HENT: Negative for congestion, drooling, ear discharge, ear pain, facial swelling, rhinorrhea and sore throat  Eyes: Negative for redness  Respiratory: Positive for cough  Negative for shortness of breath  Cardiovascular: Negative for chest pain  Gastrointestinal: Positive for diarrhea  Negative for abdominal pain, nausea and vomiting  Musculoskeletal: Positive for myalgias  Negative for neck stiffness  Skin: Negative for rash  Allergic/Immunologic: Negative for food allergies  Neurological: Positive for headaches  Negative for weakness and numbness  Physical Exam  Physical Exam  Vitals and nursing note reviewed  Constitutional:       General: He is not in acute distress  Appearance: He is not toxic-appearing  HENT:      Head: Normocephalic and atraumatic  Eyes:      Conjunctiva/sclera: Conjunctivae normal    Neck:      Trachea: No tracheal deviation  Cardiovascular:      Rate and Rhythm: Normal rate  Pulmonary:      Effort: Pulmonary effort is normal  No respiratory distress  Abdominal:      General: There is no distension  Musculoskeletal:      Cervical back: Normal range of motion and neck supple  Skin:     General: Skin is warm and dry  Findings: No rash  Neurological:      Mental Status: He is alert  GCS: GCS eye subscore is 4  GCS verbal subscore is 5  GCS motor subscore is 6     Psychiatric:         Mood and Affect: Mood normal          Vital Signs  ED Triage Vitals [12/31/21 1332]   Temperature Pulse Respirations Blood Pressure SpO2   98 4 °F (36 9 °C) 84 18 129/77 97 %      Temp Source Heart Rate Source Patient Position - Orthostatic VS BP Location FiO2 (%)   Oral -- -- -- --      Pain Score       7           Vitals:    12/31/21 1332   BP: 129/77   Pulse: 84         Visual Acuity      ED Medications  Medications - No data to display    Diagnostic Studies  Results Reviewed     Procedure Component Value Units Date/Time    COVID/FLU - 24 hour TAT [[id number]] Collected: 12/31/21 1427    Lab Status: In process Specimen: Nasopharyngeal Swab Updated: 12/31/21 1437                 No orders to display              Procedures  Procedures         ED Course                               SBIRT 22yo+      Most Recent Value   SBIRT (25 yo +)    In order to provide better care to our patients, we are screening all of our patients for alcohol and drug use  Would it be okay to ask you these screening questions? Unable to answer at this time Filed at: 12/31/2021 1429                    MDM  Number of Diagnoses or Management Options  Acute viral syndrome: new and requires workup  Person under investigation for COVID-19: new and requires workup  Diagnosis management comments: DDX consists of but not limited to: viral syndrome, covid, flu    Will check covid/flu test  Will discharge patient prior to test resulting  Will call patient with results  Patient agreeable  The management plan was discussed in detail with the patient at bedside and all questions were answered  Prior to discharge, we provided both verbal and written instructions  We discussed with the patient the signs and symptoms for which to return to the emergency department  All questions were answered and patient was comfortable with the plan of care and discharged to home  Instructed the patient to follow up with the primary care provider and/or specialist provided and their written instructions  The patient verbalized understanding of our discussion and plan of care, and agrees to return to the Emergency Department for concerns and progression of illness      At discharge, I instructed the patient to:  -follow up with pcp  -take Tylenol or Motrin for pain or fever  -rest and drink plenty of fluids  -return to the ER if symptoms worsened or new symptoms arose  Patient agreed to this plan and was stable at time of discharge  Amount and/or Complexity of Data Reviewed  Clinical lab tests: ordered and reviewed    Patient Progress  Patient progress: stable      Disposition  Final diagnoses:   Acute viral syndrome   Person under investigation for COVID-19     Time reflects when diagnosis was documented in both MDM as applicable and the Disposition within this note     Time User Action Codes Description Comment    12/31/2021  2:23 PM Bryce MORA Add [B34 9] Acute viral syndrome     12/31/2021  2:23 PM Bryce Lucianofrancisco MORA Add [Z20 822] Person under investigation for COVID-19       ED Disposition     ED Disposition Condition Date/Time Comment    Discharge Stable Fri Dec 31, 2021  2:23 PM [employer] discharge to home/self care  Follow-up Information     Follow up With Specialties Details Why Contact Info    Kye Conde PA-C Family Medicine, Physician Assistant Schedule an appointment as soon as possible for a visit  As needed [address]  [address] [facility] [address]  [phone]            Discharge Medication List as of 12/31/2021  2:24 PM      CONTINUE these medications which have NOT CHANGED    Details   atorvastatin (LIPITOR) 40 mg tablet Take 1 tablet (40 mg total) by mouth daily, Starting Fri 2/26/2021, Normal      predniSONE 10 mg tablet Take 4 pills for 4 day, 3 pills for 3 days, 2 pills for 2 days and 1 pill on last day, Normal      tiZANidine (ZANAFLEX) 2 mg tablet Take 1 tablet (2 mg total) by mouth every 8 (eight) hours as needed for muscle spasms, Starting Fri 9/17/2021, Normal      vitamin B-12 (VITAMIN B-12) 1,000 mcg tablet Take 1 tablet (1,000 mcg total) by mouth daily, Starting Fri 2/26/2021, Normal             No discharge procedures on file      PDMP Review       Value Time User    PDMP Reviewed  Yes 9/17/2021 10:28 AM Kye Conde PA-C          ED Provider  Electronically Signed by           Milan Benedict PA-C  12/31/21 3704

## 2022-01-05 LAB
FLUAV RNA RESP QL NAA+PROBE: NEGATIVE
FLUBV RNA RESP QL NAA+PROBE: NEGATIVE
SARS-COV-2 RNA RESP QL NAA+PROBE: POSITIVE

## 2022-01-10 ENCOUNTER — APPOINTMENT (EMERGENCY)
Dept: RADIOLOGY | Facility: HOSPITAL | Age: 57
End: 2022-01-10
Payer: MEDICARE

## 2022-01-10 ENCOUNTER — HOSPITAL ENCOUNTER (EMERGENCY)
Facility: HOSPITAL | Age: 57
Discharge: HOME | End: 2022-01-10
Attending: EMERGENCY MEDICINE | Admitting: EMERGENCY MEDICINE
Payer: MEDICARE

## 2022-01-10 ENCOUNTER — APPOINTMENT (EMERGENCY)
Dept: CT IMAGING | Facility: HOSPITAL | Age: 57
End: 2022-01-10
Payer: MEDICARE

## 2022-01-10 VITALS
BODY MASS INDEX: 27.25 KG/M2 | SYSTOLIC BLOOD PRESSURE: 105 MMHG | DIASTOLIC BLOOD PRESSURE: 69 MMHG | OXYGEN SATURATION: 99 % | RESPIRATION RATE: 16 BRPM | WEIGHT: 184.53 LBS | HEART RATE: 75 BPM | TEMPERATURE: 98.5 F

## 2022-01-10 DIAGNOSIS — J12.82 PNEUMONIA DUE TO COVID-19 VIRUS: Primary | ICD-10-CM

## 2022-01-10 DIAGNOSIS — E87.6 HYPOKALEMIA: ICD-10-CM

## 2022-01-10 DIAGNOSIS — N17.9 AKI (ACUTE KIDNEY INJURY) (HCC): ICD-10-CM

## 2022-01-10 DIAGNOSIS — R79.89 ELEVATED LFTS: ICD-10-CM

## 2022-01-10 DIAGNOSIS — U07.1 PNEUMONIA DUE TO COVID-19 VIRUS: Primary | ICD-10-CM

## 2022-01-10 LAB
2HR DELTA HS TROPONIN: -13 NG/L
ALBUMIN SERPL BCP-MCNC: 2.5 G/DL (ref 3.5–5)
ALP SERPL-CCNC: 154 U/L (ref 46–116)
ALT SERPL W P-5'-P-CCNC: 172 U/L (ref 12–78)
ANION GAP SERPL CALCULATED.3IONS-SCNC: 10 MMOL/L (ref 4–13)
AST SERPL W P-5'-P-CCNC: 102 U/L (ref 5–45)
ATRIAL RATE: 71 BPM
ATRIAL RATE: 82 BPM
BASOPHILS # BLD AUTO: 0.01 THOUSANDS/ΜL (ref 0–0.1)
BASOPHILS NFR BLD AUTO: 0 % (ref 0–1)
BILIRUB SERPL-MCNC: 0.86 MG/DL (ref 0.2–1)
BUN SERPL-MCNC: 19 MG/DL (ref 5–25)
CALCIUM ALBUM COR SERPL-MCNC: 9.6 MG/DL (ref 8.3–10.1)
CALCIUM SERPL-MCNC: 8.4 MG/DL (ref 8.3–10.1)
CARDIAC TROPONIN I PNL SERPL HS: 20 NG/L
CARDIAC TROPONIN I PNL SERPL HS: 7 NG/L
CHLORIDE SERPL-SCNC: 97 MMOL/L (ref 100–108)
CO2 SERPL-SCNC: 28 MMOL/L (ref 21–32)
CREAT SERPL-MCNC: 1.42 MG/DL (ref 0.6–1.3)
EOSINOPHIL # BLD AUTO: 0.1 THOUSAND/ΜL (ref 0–0.61)
EOSINOPHIL NFR BLD AUTO: 1 % (ref 0–6)
ERYTHROCYTE [DISTWIDTH] IN BLOOD BY AUTOMATED COUNT: 13 % (ref 11.6–15.1)
GFR SERPL CREATININE-BSD FRML MDRD: 55 ML/MIN/1.73SQ M
GLUCOSE SERPL-MCNC: 132 MG/DL (ref 65–140)
HCT VFR BLD AUTO: 39.6 % (ref 36.5–49.3)
HGB BLD-MCNC: 13.6 G/DL (ref 12–17)
IMM GRANULOCYTES # BLD AUTO: 0.06 THOUSAND/UL (ref 0–0.2)
IMM GRANULOCYTES NFR BLD AUTO: 1 % (ref 0–2)
LYMPHOCYTES # BLD AUTO: 0.81 THOUSANDS/ΜL (ref 0.6–4.47)
LYMPHOCYTES NFR BLD AUTO: 11 % (ref 14–44)
MCH RBC QN AUTO: 31.5 PG (ref 26.8–34.3)
MCHC RBC AUTO-ENTMCNC: 34.3 G/DL (ref 31.4–37.4)
MCV RBC AUTO: 92 FL (ref 82–98)
MONOCYTES # BLD AUTO: 0.52 THOUSAND/ΜL (ref 0.17–1.22)
MONOCYTES NFR BLD AUTO: 7 % (ref 4–12)
NEUTROPHILS # BLD AUTO: 6.05 THOUSANDS/ΜL (ref 1.85–7.62)
NEUTS SEG NFR BLD AUTO: 80 % (ref 43–75)
NRBC BLD AUTO-RTO: 0 /100 WBCS
P AXIS: 62 DEGREES
P AXIS: 63 DEGREES
PLATELET # BLD AUTO: 350 THOUSANDS/UL (ref 149–390)
PMV BLD AUTO: 10.3 FL (ref 8.9–12.7)
POTASSIUM SERPL-SCNC: 3 MMOL/L (ref 3.5–5.3)
PR INTERVAL: 152 MS
PR INTERVAL: 154 MS
PROT SERPL-MCNC: 7.9 G/DL (ref 6.4–8.2)
QRS AXIS: -51 DEGREES
QRS AXIS: -64 DEGREES
QRSD INTERVAL: 138 MS
QRSD INTERVAL: 146 MS
QT INTERVAL: 414 MS
QT INTERVAL: 428 MS
QTC INTERVAL: 449 MS
QTC INTERVAL: 500 MS
RBC # BLD AUTO: 4.32 MILLION/UL (ref 3.88–5.62)
SODIUM SERPL-SCNC: 135 MMOL/L (ref 136–145)
T WAVE AXIS: 24 DEGREES
T WAVE AXIS: 33 DEGREES
VENTRICULAR RATE: 71 BPM
VENTRICULAR RATE: 82 BPM
WBC # BLD AUTO: 7.55 THOUSAND/UL (ref 4.31–10.16)

## 2022-01-10 PROCEDURE — 70450 CT HEAD/BRAIN W/O DYE: CPT

## 2022-01-10 PROCEDURE — 36415 COLL VENOUS BLD VENIPUNCTURE: CPT | Performed by: PHYSICIAN ASSISTANT

## 2022-01-10 PROCEDURE — 93010 ELECTROCARDIOGRAM REPORT: CPT | Performed by: INTERNAL MEDICINE

## 2022-01-10 PROCEDURE — 96361 HYDRATE IV INFUSION ADD-ON: CPT

## 2022-01-10 PROCEDURE — 85025 COMPLETE CBC W/AUTO DIFF WBC: CPT | Performed by: PHYSICIAN ASSISTANT

## 2022-01-10 PROCEDURE — 93005 ELECTROCARDIOGRAM TRACING: CPT

## 2022-01-10 PROCEDURE — 84484 ASSAY OF TROPONIN QUANT: CPT | Performed by: PHYSICIAN ASSISTANT

## 2022-01-10 PROCEDURE — 96360 HYDRATION IV INFUSION INIT: CPT

## 2022-01-10 PROCEDURE — 71045 X-RAY EXAM CHEST 1 VIEW: CPT

## 2022-01-10 PROCEDURE — 80053 COMPREHEN METABOLIC PANEL: CPT | Performed by: PHYSICIAN ASSISTANT

## 2022-01-10 PROCEDURE — 99285 EMERGENCY DEPT VISIT HI MDM: CPT | Performed by: PHYSICIAN ASSISTANT

## 2022-01-10 PROCEDURE — 99284 EMERGENCY DEPT VISIT MOD MDM: CPT

## 2022-01-10 RX ORDER — POTASSIUM CHLORIDE 20 MEQ/1
40 TABLET, EXTENDED RELEASE ORAL ONCE
Status: COMPLETED | OUTPATIENT
Start: 2022-01-10 | End: 2022-01-10

## 2022-01-10 RX ORDER — MELATONIN
2000 DAILY
Qty: 30 TABLET | Refills: 0 | Status: SHIPPED | OUTPATIENT
Start: 2022-01-10 | End: 2022-01-20 | Stop reason: SDDI

## 2022-01-10 RX ADMIN — POTASSIUM CHLORIDE 40 MEQ: 1500 TABLET, EXTENDED RELEASE ORAL at 12:56

## 2022-01-10 RX ADMIN — SODIUM CHLORIDE 1000 ML: 0.9 INJECTION, SOLUTION INTRAVENOUS at 12:15

## 2022-01-10 NOTE — DISCHARGE INSTRUCTIONS
Discussed reasons to come back to the ED including worsening SOB and low pulse ox <90%  Discussed supportive care, sleeping at an incline and self proning at home to help with breathing symptoms      Follow-up with PCP to have blood work re-checked given mildly elevated LFTs and mildly elevated creatinine

## 2022-01-10 NOTE — ED PROVIDER NOTES
History  Chief Complaint   Patient presents with    Generalized Body Aches     pt c/o generalized body aches and weakness  pt states he was + for covid on the 12/29/21  pt states he still does not feel better  pt  c/o diarhea but deneis v/n or fevers     Patient is a 71-year-old male with history of hyperlipidemia, presents emergency department for evaluation of dizziness, body aches, generalized weakness and shortness of breath  Patient states he tested positive for COVID on 12/31/2021  Patient states ever since has had gradually worsening generalized weakness, body aches, dizziness/lightheadedness, nausea/vomiting, decreased p o  Intake, weight loss  Patient with daughter who states 2 days ago patient was in the bathroom, became very dizzy/lightheaded, had syncopal episode, unsure of head injury, positive loss of consciousness  Patient does not take any blood thinners  Patient denies any headache  Patient states he has been taking Tylenol and NyQuil, but stop both of these  Patient does not have any fevers  Patient has been having multiple episodes of NBNB vomiting and watery diarrhea  Patient denies chest pain, vision changes, focal weakness, sensory deficit, facial asymmetry, speech changes, abdominal pain, leg pain/swelling, palpitations  Prior to Admission Medications   Prescriptions Last Dose Informant Patient Reported?  Taking?   atorvastatin (LIPITOR) 40 mg tablet Not Taking at Unknown time  No No   Sig: Take 1 tablet (40 mg total) by mouth daily   Patient not taking: Reported on 9/17/2021   predniSONE 10 mg tablet Not Taking at Unknown time  No No   Sig: Take 4 pills for 4 day, 3 pills for 3 days, 2 pills for 2 days and 1 pill on last day   Patient not taking: Reported on 1/10/2022    tiZANidine (ZANAFLEX) 2 mg tablet Not Taking at Unknown time  No No   Sig: Take 1 tablet (2 mg total) by mouth every 8 (eight) hours as needed for muscle spasms   Patient not taking: Reported on 1/10/2022 vitamin B-12 (VITAMIN B-12) 1,000 mcg tablet Not Taking at Unknown time  No No   Sig: Take 1 tablet (1,000 mcg total) by mouth daily   Patient not taking: Reported on 9/17/2021      Facility-Administered Medications Last Administration Doses Remaining   cyanocobalamin injection 1,000 mcg 9/17/2021 11:33 AM           Past Medical History:   Diagnosis Date    Hyperlipidemia        History reviewed  No pertinent surgical history  History reviewed  No pertinent family history  I have reviewed and agree with the history as documented  E-Cigarette/Vaping    E-Cigarette Use Never User      E-Cigarette/Vaping Substances     Social History     Tobacco Use    Smoking status: Former Smoker     Types: Cigarettes    Smokeless tobacco: Never Used   Vaping Use    Vaping Use: Never used   Substance Use Topics    Alcohol use: No     Alcohol/week: 1 0 standard drink     Types: 1 Cans of beer per week    Drug use: No       Review of Systems   Constitutional: Positive for appetite change, fatigue and unexpected weight change  Negative for fever  HENT: Negative for congestion, ear pain and sore throat  Eyes: Negative for visual disturbance  Respiratory: Positive for cough and shortness of breath  Cardiovascular: Negative for chest pain, palpitations and leg swelling  Gastrointestinal: Negative for abdominal pain, diarrhea, nausea and vomiting  Genitourinary: Negative for dysuria and hematuria  Musculoskeletal: Negative for back pain and neck pain  Skin: Negative for rash  Neurological: Positive for dizziness, syncope and light-headedness  Negative for tremors, seizures, facial asymmetry, speech difficulty, weakness and headaches  Psychiatric/Behavioral: Negative for confusion  Physical Exam  Physical Exam  Constitutional:       General: He is not in acute distress  Appearance: He is well-developed  He is ill-appearing  He is not toxic-appearing or diaphoretic     HENT:      Head: Normocephalic and atraumatic  Right Ear: External ear normal       Left Ear: External ear normal       Nose: Nose normal       Mouth/Throat:      Lips: Pink  Mouth: Mucous membranes are moist    Eyes:      General: Vision grossly intact  Extraocular Movements: Extraocular movements intact  Conjunctiva/sclera: Conjunctivae normal       Pupils: Pupils are equal, round, and reactive to light  Cardiovascular:      Rate and Rhythm: Normal rate and regular rhythm  Pulmonary:      Effort: Pulmonary effort is normal  No tachypnea or respiratory distress  Breath sounds: Decreased breath sounds (diffuse) present  No wheezing, rhonchi or rales  Musculoskeletal:      Cervical back: Normal range of motion and neck supple  Right lower leg: Normal  No swelling, tenderness or bony tenderness  No edema  Left lower leg: Normal  No swelling, tenderness or bony tenderness  No edema  Skin:     General: Skin is warm and dry  Capillary Refill: Capillary refill takes less than 2 seconds  Neurological:      General: No focal deficit present  Mental Status: He is alert and oriented to person, place, and time  GCS: GCS eye subscore is 4  GCS verbal subscore is 5  GCS motor subscore is 6  Sensory: Sensation is intact  Motor: Motor function is intact        Comments: Strength 5/5 bilateral upper and lower extremities    Psychiatric:         Mood and Affect: Mood and affect normal          Speech: Speech normal          Vital Signs  ED Triage Vitals [01/10/22 1032]   Temperature Pulse Respirations Blood Pressure SpO2   98 5 °F (36 9 °C) 83 18 125/73 95 %      Temp Source Heart Rate Source Patient Position - Orthostatic VS BP Location FiO2 (%)   Oral Monitor Sitting Right arm --      Pain Score       No Pain           Vitals:    01/10/22 1032 01/10/22 1327 01/10/22 1501   BP: 125/73 104/71 105/69   Pulse: 83 81 75   Patient Position - Orthostatic VS: Sitting Sitting Lying Visual Acuity      ED Medications  Medications   sodium chloride 0 9 % bolus 1,000 mL (0 mL Intravenous Stopped 1/10/22 1412)   potassium chloride (K-DUR,KLOR-CON) CR tablet 40 mEq (40 mEq Oral Given 1/10/22 1256)       Diagnostic Studies  Results Reviewed     Procedure Component Value Units Date/Time    HS Troponin I 2hr [[id number]] Collected: 01/10/22 1412    Lab Status: Final result Specimen: Blood from Arm, Right Updated: 01/10/22 1445     hs TnI 2hr 7 ng/L      Delta 2hr hsTnI -13 ng/L     HS Troponin 0hr (reflex protocol) [[id number]]  (Normal) Collected: 01/10/22 1215    Lab Status: Final result Specimen: Blood from Arm, Right Updated: 01/10/22 1249     hs TnI 0hr 20 ng/L     Comprehensive metabolic panel [[id number]]  (Abnormal) Collected: 01/10/22 1215    Lab Status: Final result Specimen: Blood from Arm, Right Updated: 01/10/22 1243     Sodium 135 mmol/L      Potassium 3 0 mmol/L      Chloride 97 mmol/L      CO2 28 mmol/L      ANION GAP 10 mmol/L      BUN 19 mg/dL      Creatinine 1 42 mg/dL      Glucose 132 mg/dL      Calcium 8 4 mg/dL      Corrected Calcium 9 6 mg/dL       U/L       U/L      Alkaline Phosphatase 154 U/L      Total Protein 7 9 g/dL      Albumin 2 5 g/dL      Total Bilirubin 0 86 mg/dL      eGFR 55 ml/min/1 73sq m     Narrative:      Minnie guidelines for Chronic Kidney Disease (CKD):     Stage 1 with normal or high GFR (GFR > 90 mL/min/1 73 square meters)    Stage 2 Mild CKD (GFR = 60-89 mL/min/1 73 square meters)    Stage 3A Moderate CKD (GFR = 45-59 mL/min/1 73 square meters)    Stage 3B Moderate CKD (GFR = 30-44 mL/min/1 73 square meters)    Stage 4 Severe CKD (GFR = 15-29 mL/min/1 73 square meters)    Stage 5 End Stage CKD (GFR <15 mL/min/1 73 square meters)  Note: GFR calculation is accurate only with a steady state creatinine    CBC and differential [[id number]]  (Abnormal) Collected: 01/10/22 1215    Lab Status: Final result Specimen: Blood from Arm, Right Updated: 01/10/22 1226     WBC 7 55 Thousand/uL      RBC 4 32 Million/uL      Hemoglobin 13 6 g/dL      Hematocrit 39 6 %      MCV 92 fL      MCH 31 5 pg      MCHC 34 3 g/dL      RDW 13 0 %      MPV 10 3 fL      Platelets 057 Thousands/uL      nRBC 0 /100 WBCs      Neutrophils Relative 80 %      Immat GRANS % 1 %      Lymphocytes Relative 11 %      Monocytes Relative 7 %      Eosinophils Relative 1 %      Basophils Relative 0 %      Neutrophils Absolute 6 05 Thousands/µL      Immature Grans Absolute 0 06 Thousand/uL      Lymphocytes Absolute 0 81 Thousands/µL      Monocytes Absolute 0 52 Thousand/µL      Eosinophils Absolute 0 10 Thousand/µL      Basophils Absolute 0 01 Thousands/µL                  CT head without contrast   Final Result by David Fields MD (01/10 1309)      No acute intracranial process or significant interval change  No acute skull fracture  Workstation performed: [id number]         XR chest 1 view portable   ED Interpretation by Reanna Orr PA-C (01/10 1225)   COVID PNA      Final Result by Paolo Holly MD (01/10 6410)   Peripheral bilateral groundglass opacities consistent with Covid 19 infiltrates        Workstation performed: [id number]                    Procedures  ECG 12 Lead Documentation Only    Date/Time: 1/10/2022 12:31 PM  Performed by: Reanna Orr PA-C  Authorized by: Reanna Orr PA-C     Indications / Diagnosis:  Sob  ECG reviewed by me, the ED Provider: yes    Patient location:  ED  Previous ECG:     Previous ECG:  Compared to current    Comparison ECG info:  1/24/2018    Similarity:  No change    Comparison to cardiac monitor: Yes    Interpretation:     Interpretation: abnormal    Quality:     Tracing quality:  Limited by artifact  Rate:     ECG rate:  82    ECG rate assessment: normal    Rhythm:     Rhythm: sinus rhythm    Ectopy:     Ectopy: none    QRS:     QRS axis:  Left    QRS intervals:  Normal  Conduction:     Conduction: abnormal      Abnormal conduction: complete RBBB    ST segments:     ST segments:  Normal  T waves:     T waves: normal    Comments:      RBBB in previous EKG  QT/QTc: 428/500  No STEMI             ED Course  ED Course as of 01/11/22 1812   Mon Kush 10, 2022   1155 Ambulatory pulse ox 95%   1250 hs TnI 0hr: 20  Will obtain delta   1331 TT to [facility] Remote Monitoring Onboarding regarding [employer] home monitoring    1357 Patient qualifies for home monitoring, will send home with supplies                               SBIRT 20yo+      Most Recent Value   SBIRT (23 yo +)    In order to provide better care to our patients, we are screening all of our patients for alcohol and drug use  Would it be okay to ask you these screening questions? No Filed at: 01/10/2022 1106                    MDM  Number of Diagnoses or Management Options  LISHA (acute kidney injury) ([facility] [address] ): new and does not require workup  Elevated LFTs: new and does not require workup  Hypokalemia: new and does not require workup  Pneumonia due to COVID-19 virus: new and does not require workup  Diagnosis management comments: Patient is a 55-year-old male with history of hyperlipidemia, presents emergency department for evaluation of dizziness, body aches, generalized weakness and shortness of breath  Will obtain cardiac workup including EKG, CXR and troponin given dizziness/SOB  CXR shows COVID pneumonia   LISHA and elevated LFTs  Hypokalemia repleted, IVF given  Ambulatory pulse ox 95% on RA  Patient evaluated for home monitoring and supplies provided, ambulatory for PCP provided and encouraged to f/u  Will need to f/u for repeat labs to monitor LFTs     Discussed reasons to come back to the ED including worsening SOB and low pulse ox <90%  Discussed supportive care, sleeping at an incline and self proning at home to help with breathing symptoms  Patient verbalizes understanding and agrees with plan   The management plan was discussed in detail with the patient at bedside and all questions were answered  Prior to discharge, I provided both verbal and written instructions  I discussed with the patient the signs and symptoms for which to return to the emergency department  All questions were answered and patient was comfortable with the plan of care and discharged to home  The patient agrees to return to the Emergency Department for concerns and/or progression of illness  Disposition  Final diagnoses:   Pneumonia due to COVID-19 virus   LISHA (acute kidney injury) ([facility] )   Hypokalemia   Elevated LFTs     Time reflects when diagnosis was documented in both MDM as applicable and the Disposition within this note     Time User Action Codes Description Comment    1/10/2022 12:25 PM Harper Hock Add [U07 1,  J12 82] Pneumonia due to COVID-19 virus     1/10/2022 12:50 PM Harper Hock Add [N17 9] LISHA (acute kidney injury) ([facility] )     1/10/2022 12:50 PM Harper Hock Add [E87 6] Hypokalemia     1/10/2022 12:50 PM Harper Hock Add [R79 89] Elevated LFTs       ED Disposition     ED Disposition Condition Date/Time Comment    Discharge Stable Mon Kush 10, 2022  2:55 PM Alex Taylor discharge to home/self care              Follow-up Information     Follow up With Specialties Details Why Contact Info Additional Information    Kye Conde PA-C Family Medicine, Physician Assistant Schedule an appointment as soon as possible for a visit in 2 days  [address]  [address] Emergency Department Emergency Medicine Go to  If symptoms worsen [facility] [id number]  [address] [place] [address] Emergency Department, [address] [place] [address], [id number]          Discharge Medication List as of 1/10/2022  2:56 PM      START taking these medications    Details   cholecalciferol (VITAMIN D3) 1,000 units tablet Take 2 tablets (2,000 Units total) by mouth daily, Starting Mon 1/10/2022, Normal         CONTINUE these medications which have NOT CHANGED    Details   atorvastatin (LIPITOR) 40 mg tablet Take 1 tablet (40 mg total) by mouth daily, Starting Fri 2/26/2021, Normal      predniSONE 10 mg tablet Take 4 pills for 4 day, 3 pills for 3 days, 2 pills for 2 days and 1 pill on last day, Normal      tiZANidine (ZANAFLEX) 2 mg tablet Take 1 tablet (2 mg total) by mouth every 8 (eight) hours as needed for muscle spasms, Starting Fri 9/17/2021, Normal      vitamin B-12 (VITAMIN B-12) 1,000 mcg tablet Take 1 tablet (1,000 mcg total) by mouth daily, Starting Fri 2/26/2021, Normal             No discharge procedures on file      PDMP Review       Value Time User    PDMP Reviewed  Yes 9/17/2021 10:28 AM Kye Conde PA-C          ED Provider  Electronically Signed by           Alden Ordoñez PA-C  01/11/22 4523

## 2022-01-11 ENCOUNTER — TELEPHONE (OUTPATIENT)
Dept: OTHER | Facility: HOSPITAL | Age: 57
End: 2022-01-11

## 2022-01-11 NOTE — TELEPHONE ENCOUNTER
01/11/2022 @ 0302  Called placed to [facility] due to SpO2 < 90% for 10 minutes  Answered phone immediately and denied any distress - states he was sleeping in his bed

## 2022-01-11 NOTE — TELEPHONE ENCOUNTER
1/10/2022 @ 0952 - Reached out to [place] for assessment of status re: SpO2 < 90% for > 10 min  Wife reports [place] is sleeping and in no distress

## 2022-01-11 NOTE — TELEPHONE ENCOUNTER
Pt called @ [phone] due to having SpO2 < 90% for > 10 minutes  Pt answered the phone himself and denied any distress

## 2022-01-12 ENCOUNTER — TELEPHONE (OUTPATIENT)
Dept: OTHER | Facility: HOSPITAL | Age: 57
End: 2022-01-12

## 2022-01-12 NOTE — TELEPHONE ENCOUNTER
Phone call to patient and wife to check patient status  They report that patient continues to feel fatigued  Denies shortness of breath, fever, headache  States taking fluids and tolerating well, eating small amounts today, but that appetite remains below his baseline  Patient showered and tolerated well  Resting throughout the day  Data via [employer] at time of call: SpO2 96 4%; HR 77, RR 17   Patient and wife reassured, reinforced instructions to maintain sensor and call the [facility] for any problems or questions  They acknowledged information and verbalized an understanding of instructions given

## 2022-01-13 ENCOUNTER — TELEPHONE (OUTPATIENT)
Dept: FAMILY MEDICINE CLINIC | Facility: CLINIC | Age: 57
End: 2022-01-13

## 2022-01-13 ENCOUNTER — TELEMEDICINE (OUTPATIENT)
Dept: FAMILY MEDICINE CLINIC | Facility: CLINIC | Age: 57
End: 2022-01-13

## 2022-01-13 DIAGNOSIS — U07.1 PNEUMONIA DUE TO COVID-19 VIRUS: Primary | ICD-10-CM

## 2022-01-13 DIAGNOSIS — J12.82 PNEUMONIA DUE TO COVID-19 VIRUS: Primary | ICD-10-CM

## 2022-01-13 PROCEDURE — 99214 OFFICE O/P EST MOD 30 MIN: CPT | Performed by: PHYSICIAN ASSISTANT

## 2022-01-13 NOTE — ASSESSMENT & PLAN NOTE
Symptoms are improving, currently on home [facility] patient monitoring  Will continue to follow    Has been doing well last 24 hours

## 2022-01-13 NOTE — TELEPHONE ENCOUNTER
Maile alarm for SpO2 72%  Pt was called and he reports he feels fine and said he did not see his SpO2 drop "on my end"  Denies any shortness of breath  Instructed pt he can call [facility] with any questions/issues

## 2022-01-13 NOTE — TELEPHONE ENCOUNTER
----- Message from [id number] [place], [place] [id number] sent at 1/13/2022  3:28 PM EST -----  Regarding: covid  Can you set him up for in office covid pneumonia f/u next week    He has is past 14 days

## 2022-01-13 NOTE — PROGRESS NOTES
COVID-19 Outpatient Progress Note    Assessment/Plan:    Problem List Items Addressed This Visit        Respiratory    Pneumonia due to COVID-19 virus - Primary     Symptoms are improving, currently on home masimo patient monitoring  Will continue to follow  Has been doing well last 24 hours              Disposition:     Reviewed masimo safety net data   93Oxygen Saturation  69Heart Rate  26Respiratory Rate      I have spent 14 minutes directly with the patient  Greater than 50% of this time was spent in counseling/coordination of care regarding: instructions for management and patient and family education  To continue with masimo monitoring and patient recommended follow up next week     Encounter provider Kye Conde PA-C    Provider located at [address] [address] [phone]    Recent Visits  No visits were found meeting these conditions  Showing recent visits within past 7 days and meeting all other requirements  Today's Visits  Date Type Provider Dept   01/13/22 Telemedicine Kye Conde PA-C  [facility]   Showing today's visits and meeting all other requirements  Future Appointments  No visits were found meeting these conditions  Showing future appointments within next 150 days and meeting all other requirements     This virtual check-in was done via [employer] and patient was informed that this is a secure, HIPAA-compliant platform  He agrees to proceed  Patient agrees to participate in a virtual check in via telephone or video visit instead of presenting to the office to address urgent/immediate medical needs  Patient is aware this is a billable service  After connecting through [facility], the patient was identified by name and date of birth  Dora Favors was informed that this was a telemedicine visit and that the exam was being conducted confidentially over secure lines  My office door was closed   No one else was in the room  Kaden Polo acknowledged consent and understanding of privacy and security of the telemedicine visit  I informed the patient that I have reviewed his record in Epic and presented the opportunity for him to ask any questions regarding the visit today  The patient agreed to participate  Verification of patient location:  Patient is located in the following state in which I hold an active license: [place]    Subjective:   Kaden Polo is a 55 y o  male who has been screened for COVID-19  Symptom change since last report: resolving  Patient's symptoms include fatigue (little) and cough (little)  Patient denies fever, chills, malaise, congestion, rhinorrhea, sore throat, shortness of breath, chest tightness, abdominal pain, nausea, vomiting, diarrhea, myalgias and headaches  - Date of symptom onset: 12/27/2020  - Date of positive COVID-19 PCR: 12/31/2021  Type of test: PCR    COVID-19 vaccination status: Not vaccinated    Patricio Umanzor has been staying home and has isolated themselves in his home  He is taking care to not share personal items and is cleaning all surfaces that are touched often, like counters, tabletops, and doorknobs using household cleaning sprays or wipes  He is wearing a mask when he leaves his room  Eating/drinking improving  Wearing masimo monitoring  Lab Results   Component Value Date    SARSCOV2 Positive (A) 12/31/2021     Past Medical History:   Diagnosis Date    Hyperlipidemia      No past surgical history on file    Current Outpatient Medications   Medication Sig Dispense Refill    atorvastatin (LIPITOR) 40 mg tablet Take 1 tablet (40 mg total) by mouth daily (Patient not taking: Reported on 9/17/2021) 90 tablet 1    cholecalciferol (VITAMIN D3) 1,000 units tablet Take 2 tablets (2,000 Units total) by mouth daily 30 tablet 0    predniSONE 10 mg tablet Take 4 pills for 4 day, 3 pills for 3 days, 2 pills for 2 days and 1 pill on last day (Patient not taking: Reported on 1/10/2022 ) 30 tablet 0    tiZANidine (ZANAFLEX) 2 mg tablet Take 1 tablet (2 mg total) by mouth every 8 (eight) hours as needed for muscle spasms (Patient not taking: Reported on 1/10/2022 ) 30 tablet 0    vitamin B-12 (VITAMIN B-12) 1,000 mcg tablet Take 1 tablet (1,000 mcg total) by mouth daily (Patient not taking: Reported on 9/17/2021) 90 tablet 1     Current Facility-Administered Medications   Medication Dose Route Frequency Provider Last Rate Last Admin    cyanocobalamin injection 1,000 mcg  1,000 mcg Intramuscular Daily Kye Conde PA-C   1,000 mcg at 09/17/21 1133     No Known Allergies    Review of Systems   Constitutional: Positive for fatigue (little)  Negative for chills and fever  HENT: Negative for congestion, rhinorrhea and sore throat  Respiratory: Positive for cough (little)  Negative for chest tightness and shortness of breath  Gastrointestinal: Negative for abdominal pain, diarrhea, nausea and vomiting  Musculoskeletal: Negative for myalgias  Neurological: Negative for headaches  Objective: There were no vitals filed for this visit  Physical Exam  Constitutional:       Appearance: Normal appearance  HENT:      Head: Normocephalic and atraumatic  Right Ear: Ear canal normal       Left Ear: Ear canal normal       Nose: Nose normal    Eyes:      Conjunctiva/sclera: Conjunctivae normal    Neck:      Comments: No visible masses    Pulmonary:      Effort: Pulmonary effort is normal  No respiratory distress  Musculoskeletal:      Cervical back: Normal range of motion  Neurological:      Mental Status: He is alert  Psychiatric:         Mood and Affect: Mood normal          Behavior: Behavior normal          VIRTUAL VISIT DISCLAIMER    Ramon Craft verbally agrees to participate in [employer]   Pt is aware that [employer] could be limited without vital signs or the ability to perform a full hands-on physical exam  Perico Roopa Parent understands he or the provider may request at any time to terminate the video visit and request the patient to seek care or treatment in person

## 2022-01-18 ENCOUNTER — TELEPHONE (OUTPATIENT)
Dept: FAMILY MEDICINE CLINIC | Facility: CLINIC | Age: 57
End: 2022-01-18

## 2022-01-20 ENCOUNTER — OFFICE VISIT (OUTPATIENT)
Dept: FAMILY MEDICINE CLINIC | Facility: CLINIC | Age: 57
End: 2022-01-20

## 2022-01-20 VITALS
HEART RATE: 100 BPM | OXYGEN SATURATION: 98 % | SYSTOLIC BLOOD PRESSURE: 122 MMHG | TEMPERATURE: 98 F | WEIGHT: 186.7 LBS | BODY MASS INDEX: 27.57 KG/M2 | DIASTOLIC BLOOD PRESSURE: 80 MMHG | RESPIRATION RATE: 18 BRPM

## 2022-01-20 DIAGNOSIS — E66.3 OVERWEIGHT (BMI 25.0-29.9): ICD-10-CM

## 2022-01-20 DIAGNOSIS — U07.1 PNEUMONIA DUE TO COVID-19 VIRUS: Primary | ICD-10-CM

## 2022-01-20 DIAGNOSIS — J12.82 PNEUMONIA DUE TO COVID-19 VIRUS: Primary | ICD-10-CM

## 2022-01-20 PROCEDURE — 99214 OFFICE O/P EST MOD 30 MIN: CPT

## 2022-01-20 NOTE — PROGRESS NOTES
Assessment/Plan:    Pneumonia due to COVID-19 virus  Symptoms are resolved  Declined covid-19 vaccination  Educated him on the benefits and he is agreeable to f/u with me for any further questions regarding vaccination  Overweight (BMI 25 0-29  9)  BMI Counseling: Body mass index is 27 57 kg/m²  The BMI is above normal  Nutrition recommendations include reducing portion sizes, decreasing overall calorie intake, 3-5 servings of fruits/vegetables daily, reducing fast food intake, consuming healthier snacks, decreasing soda and/or juice intake, moderation in carbohydrate intake, increasing intake of lean protein, reducing intake of saturated fat and trans fat and reducing intake of cholesterol  Exercise recommendations include moderate aerobic physical activity for 150 minutes/week  Diagnoses and all orders for this visit:    Pneumonia due to COVID-19 virus    Overweight (BMI 25 0-29  9)          Subjective:      Patient ID: Natasha Gray is a 55 y o  male  Pt presents for a covid-19 follow-up  He was seen on 01/10/222 in the ED and was dx with viral covid-19 pna  He was given vitD and advised to do supportive treatment at home He was part of the [facility] safety net data trial  Today, he reports musculoskeletal pain from coughing but is not interested in taking any medication  No other acute complaints  He stopped all his medications and would like to follow with me in March/April to establish care with a new provider  The following portions of the patient's history were reviewed and updated as appropriate: allergies, current medications, past family history, past medical history, past social history, past surgical history and problem list     Review of Systems   Constitutional: Negative for chills and fever  HENT: Negative for ear pain and sore throat  Eyes: Negative for pain and visual disturbance  Respiratory: Negative for cough and shortness of breath      Cardiovascular: Negative for chest pain and palpitations  Gastrointestinal: Negative for abdominal pain and vomiting  Genitourinary: Negative for dysuria and hematuria  Musculoskeletal: Positive for arthralgias, back pain and myalgias  Negative for gait problem  Skin: Negative for color change and rash  Neurological: Negative for seizures and syncope  All other systems reviewed and are negative  Objective:      /80 (BP Location: Left arm, Patient Position: Sitting, Cuff Size: Standard)   Pulse 100   Temp 98 °F (36 7 °C) (Temporal)   Resp 18   Wt 84 7 kg (186 lb 11 2 oz)   SpO2 98%   BMI 27 57 kg/m²          Physical Exam  Vitals and nursing note reviewed  Constitutional:       General: He is not in acute distress  Appearance: He is overweight  He is not ill-appearing  HENT:      Head: Normocephalic and atraumatic  Right Ear: Ear canal and external ear normal  There is no impacted cerumen  Left Ear: Ear canal and external ear normal  There is no impacted cerumen  Nose: Nose normal  No congestion  Eyes:      Pupils: Pupils are equal, round, and reactive to light  Cardiovascular:      Rate and Rhythm: Normal rate and regular rhythm  Pulses: Normal pulses  Heart sounds: Normal heart sounds  No murmur heard  Pulmonary:      Effort: Pulmonary effort is normal       Breath sounds: Normal breath sounds  Abdominal:      General: Bowel sounds are normal       Palpations: Abdomen is soft  Tenderness: There is no abdominal tenderness  Musculoskeletal:         General: No tenderness  Normal range of motion  Cervical back: Normal range of motion  No tenderness  Skin:     General: Skin is warm and dry  Neurological:      General: No focal deficit present  Mental Status: He is alert and oriented to person, place, and time  Psychiatric:         Mood and Affect: Mood normal          Behavior: Behavior normal          Thought Content:  Thought content normal  Judgment: Judgment normal

## 2022-01-20 NOTE — ASSESSMENT & PLAN NOTE
Symptoms are resolved  Declined covid-19 vaccination  Educated him on the benefits and he is agreeable to f/u with me for any further questions regarding vaccination

## 2022-01-20 NOTE — ASSESSMENT & PLAN NOTE
BMI Counseling: Body mass index is 27 57 kg/m²  The BMI is above normal  Nutrition recommendations include reducing portion sizes, decreasing overall calorie intake, 3-5 servings of fruits/vegetables daily, reducing fast food intake, consuming healthier snacks, decreasing soda and/or juice intake, moderation in carbohydrate intake, increasing intake of lean protein, reducing intake of saturated fat and trans fat and reducing intake of cholesterol  Exercise recommendations include moderate aerobic physical activity for 150 minutes/week

## 2022-04-04 ENCOUNTER — LAB (OUTPATIENT)
Dept: LAB | Facility: CLINIC | Age: 57
End: 2022-04-04
Payer: MEDICARE

## 2022-04-04 ENCOUNTER — OFFICE VISIT (OUTPATIENT)
Dept: FAMILY MEDICINE CLINIC | Facility: CLINIC | Age: 57
End: 2022-04-04

## 2022-04-04 VITALS
OXYGEN SATURATION: 98 % | RESPIRATION RATE: 18 BRPM | SYSTOLIC BLOOD PRESSURE: 134 MMHG | HEART RATE: 80 BPM | HEIGHT: 68 IN | DIASTOLIC BLOOD PRESSURE: 72 MMHG | TEMPERATURE: 96.8 F | BODY MASS INDEX: 30.02 KG/M2 | WEIGHT: 198.1 LBS

## 2022-04-04 DIAGNOSIS — M25.511 PAIN OF BOTH SHOULDER JOINTS: Primary | ICD-10-CM

## 2022-04-04 DIAGNOSIS — M25.562 CHRONIC PAIN OF BOTH KNEES: ICD-10-CM

## 2022-04-04 DIAGNOSIS — M25.561 CHRONIC PAIN OF BOTH KNEES: ICD-10-CM

## 2022-04-04 DIAGNOSIS — E66.9 OBESITY (BMI 30-39.9): ICD-10-CM

## 2022-04-04 DIAGNOSIS — Z12.11 COLON CANCER SCREENING: ICD-10-CM

## 2022-04-04 DIAGNOSIS — E53.8 B12 DEFICIENCY: ICD-10-CM

## 2022-04-04 DIAGNOSIS — M25.512 PAIN OF BOTH SHOULDER JOINTS: Primary | ICD-10-CM

## 2022-04-04 DIAGNOSIS — N52.9 ERECTILE DYSFUNCTION, UNSPECIFIED ERECTILE DYSFUNCTION TYPE: ICD-10-CM

## 2022-04-04 DIAGNOSIS — G47.33 OSA (OBSTRUCTIVE SLEEP APNEA): ICD-10-CM

## 2022-04-04 DIAGNOSIS — M25.50 POLYARTHRALGIA: ICD-10-CM

## 2022-04-04 DIAGNOSIS — G89.29 CHRONIC PAIN OF BOTH KNEES: ICD-10-CM

## 2022-04-04 PROBLEM — G47.61 PLMD (PERIODIC LIMB MOVEMENT DISORDER): Status: RESOLVED | Noted: 2018-03-27 | Resolved: 2022-04-04

## 2022-04-04 PROBLEM — G47.10 HYPERSOMNIA: Status: RESOLVED | Noted: 2018-03-27 | Resolved: 2022-04-04

## 2022-04-04 PROBLEM — G47.09 OTHER INSOMNIA: Status: RESOLVED | Noted: 2018-03-27 | Resolved: 2022-04-04

## 2022-04-04 LAB
ALBUMIN SERPL BCP-MCNC: 4 G/DL (ref 3.5–5)
ALP SERPL-CCNC: 78 U/L (ref 46–116)
ALT SERPL W P-5'-P-CCNC: 44 U/L (ref 12–78)
ANION GAP SERPL CALCULATED.3IONS-SCNC: 4 MMOL/L (ref 4–13)
AST SERPL W P-5'-P-CCNC: 25 U/L (ref 5–45)
BASOPHILS # BLD AUTO: 0.07 THOUSANDS/ΜL (ref 0–0.1)
BASOPHILS NFR BLD AUTO: 1 % (ref 0–1)
BILIRUB SERPL-MCNC: 0.42 MG/DL (ref 0.2–1)
BUN SERPL-MCNC: 15 MG/DL (ref 5–25)
CALCIUM SERPL-MCNC: 9.3 MG/DL (ref 8.3–10.1)
CHLORIDE SERPL-SCNC: 106 MMOL/L (ref 100–108)
CO2 SERPL-SCNC: 29 MMOL/L (ref 21–32)
CREAT SERPL-MCNC: 1.17 MG/DL (ref 0.6–1.3)
CRP SERPL QL: <3 MG/L
EOSINOPHIL # BLD AUTO: 0.3 THOUSAND/ΜL (ref 0–0.61)
EOSINOPHIL NFR BLD AUTO: 5 % (ref 0–6)
ERYTHROCYTE [DISTWIDTH] IN BLOOD BY AUTOMATED COUNT: 14 % (ref 11.6–15.1)
GFR SERPL CREATININE-BSD FRML MDRD: 69 ML/MIN/1.73SQ M
GLUCOSE P FAST SERPL-MCNC: 114 MG/DL (ref 65–99)
HCT VFR BLD AUTO: 44.7 % (ref 36.5–49.3)
HGB BLD-MCNC: 14.3 G/DL (ref 12–17)
IMM GRANULOCYTES # BLD AUTO: 0.02 THOUSAND/UL (ref 0–0.2)
IMM GRANULOCYTES NFR BLD AUTO: 0 % (ref 0–2)
LYMPHOCYTES # BLD AUTO: 2.26 THOUSANDS/ΜL (ref 0.6–4.47)
LYMPHOCYTES NFR BLD AUTO: 41 % (ref 14–44)
MCH RBC QN AUTO: 30.9 PG (ref 26.8–34.3)
MCHC RBC AUTO-ENTMCNC: 32 G/DL (ref 31.4–37.4)
MCV RBC AUTO: 97 FL (ref 82–98)
MONOCYTES # BLD AUTO: 0.39 THOUSAND/ΜL (ref 0.17–1.22)
MONOCYTES NFR BLD AUTO: 7 % (ref 4–12)
NEUTROPHILS # BLD AUTO: 2.48 THOUSANDS/ΜL (ref 1.85–7.62)
NEUTS SEG NFR BLD AUTO: 46 % (ref 43–75)
NRBC BLD AUTO-RTO: 0 /100 WBCS
PLATELET # BLD AUTO: 205 THOUSANDS/UL (ref 149–390)
PMV BLD AUTO: 11 FL (ref 8.9–12.7)
POTASSIUM SERPL-SCNC: 4.2 MMOL/L (ref 3.5–5.3)
PROT SERPL-MCNC: 8 G/DL (ref 6.4–8.2)
RBC # BLD AUTO: 4.63 MILLION/UL (ref 3.88–5.62)
SODIUM SERPL-SCNC: 139 MMOL/L (ref 136–145)
WBC # BLD AUTO: 5.52 THOUSAND/UL (ref 4.31–10.16)

## 2022-04-04 PROCEDURE — 86618 LYME DISEASE ANTIBODY: CPT

## 2022-04-04 PROCEDURE — 86258 DGP ANTIBODY EACH IG CLASS: CPT

## 2022-04-04 PROCEDURE — 82784 ASSAY IGA/IGD/IGG/IGM EACH: CPT

## 2022-04-04 PROCEDURE — 86231 EMA EACH IG CLASS: CPT

## 2022-04-04 PROCEDURE — 85025 COMPLETE CBC W/AUTO DIFF WBC: CPT

## 2022-04-04 PROCEDURE — 80053 COMPREHEN METABOLIC PANEL: CPT

## 2022-04-04 PROCEDURE — 86038 ANTINUCLEAR ANTIBODIES: CPT

## 2022-04-04 PROCEDURE — 84402 ASSAY OF FREE TESTOSTERONE: CPT

## 2022-04-04 PROCEDURE — 99214 OFFICE O/P EST MOD 30 MIN: CPT

## 2022-04-04 PROCEDURE — 36415 COLL VENOUS BLD VENIPUNCTURE: CPT

## 2022-04-04 PROCEDURE — 86430 RHEUMATOID FACTOR TEST QUAL: CPT

## 2022-04-04 PROCEDURE — 84403 ASSAY OF TOTAL TESTOSTERONE: CPT

## 2022-04-04 PROCEDURE — 86364 TISS TRNSGLTMNASE EA IG CLAS: CPT

## 2022-04-04 PROCEDURE — 86140 C-REACTIVE PROTEIN: CPT

## 2022-04-04 RX ORDER — CYCLOBENZAPRINE HCL 10 MG
10 TABLET ORAL
Qty: 60 TABLET | Refills: 0 | Status: SHIPPED | OUTPATIENT
Start: 2022-04-04

## 2022-04-04 RX ORDER — TADALAFIL 5 MG/1
5 TABLET ORAL DAILY PRN
Qty: 10 TABLET | Refills: 0 | Status: SHIPPED | OUTPATIENT
Start: 2022-04-04 | End: 2022-04-13 | Stop reason: SDUPTHER

## 2022-04-04 NOTE — PROGRESS NOTES
Assessment/Plan:    TOM (obstructive sleep apnea)  Noncompliant with sleep apnea mask  Recommend he follow-up with sleep medicine but he declined    Pain of both shoulder joints  Pt reports pain of bilateral shoulder with any ROM test   Etiology is likely arthritis  Obesity (BMI 30-39  9)  BMI Counseling: Body mass index is 30 12 kg/m²  The BMI is above normal  Nutrition recommendations include reducing portion sizes, decreasing overall calorie intake, 3-5 servings of fruits/vegetables daily, reducing fast food intake, consuming healthier snacks, decreasing soda and/or juice intake, moderation in carbohydrate intake, increasing intake of lean protein, reducing intake of saturated fat and trans fat and reducing intake of cholesterol  Exercise recommendations include moderate aerobic physical activity for 150 minutes/week  Diagnoses and all orders for this visit:    Pain of both shoulder joints  -     cyclobenzaprine (FLEXERIL) 10 mg tablet; Take 1 tablet (10 mg total) by mouth daily at bedtime  -     XR shoulder 2+ vw left; Future  -     XR shoulder 2+ vw right; Future    Erectile dysfunction, unspecified erectile dysfunction type  -     Testosterone, free, total; Future  -     tadalafil (CIALIS) 5 MG tablet; Take 1 tablet (5 mg total) by mouth daily as needed for erectile dysfunction    TOM (obstructive sleep apnea)    Chronic pain of both knees  -     Discontinue: Diclofenac Sodium (VOLTAREN) 1 %; Apply 2 g topically 4 (four) times a day  -     Diclofenac Sodium (VOLTAREN) 1 %; Apply 2 g topically 4 (four) times a day as needed (knee pain)    Colon cancer screening  -     Ambulatory Referral to Gastroenterology; Future    Obesity (BMI 30-39 9)  -     Comprehensive metabolic panel; Future          Subjective:      Patient ID: Hector Garcia is a 65 y o  male  Hector Garcia is a 65 y o  male  has a past medical history of Hyperlipidemia and Pneumonia due to COVID-19 virus    has no past surgical history on file  He presents today endorsing multiple joint pain  The most bothersome sites are shoulders and knees  He is requesting imaging for shoulders  The following portions of the patient's history were reviewed and updated as appropriate: allergies, current medications, past family history, past medical history, past social history, past surgical history and problem list     Review of Systems   Constitutional: Negative for chills and fever  HENT: Negative for ear pain and sore throat  Eyes: Negative for pain and visual disturbance  Respiratory: Negative for cough and shortness of breath  Cardiovascular: Negative for chest pain and palpitations  Gastrointestinal: Negative for abdominal pain and vomiting  Genitourinary: Negative for dysuria and hematuria  Musculoskeletal: Negative for arthralgias and back pain  Skin: Negative for color change and rash  Neurological: Negative for seizures and syncope  All other systems reviewed and are negative  Objective:      /72 (BP Location: Left arm, Patient Position: Sitting, Cuff Size: Standard)   Pulse 80   Temp (!) 96 8 °F (36 °C) (Temporal)   Resp 18   Ht 5' 8" (1 727 m)   Wt 89 9 kg (198 lb 1 6 oz)   SpO2 98%   BMI 30 12 kg/m²          Physical Exam  Vitals and nursing note reviewed  Constitutional:       General: He is not in acute distress  Appearance: He is obese  He is not ill-appearing  HENT:      Head: Normocephalic and atraumatic  Right Ear: External ear normal  There is no impacted cerumen  Left Ear: External ear normal  There is no impacted cerumen  Nose: Nose normal  No congestion  Eyes:      Pupils: Pupils are equal, round, and reactive to light  Cardiovascular:      Rate and Rhythm: Normal rate and regular rhythm  Pulses: Normal pulses  Heart sounds: Normal heart sounds  No murmur heard        Pulmonary:      Effort: Pulmonary effort is normal       Breath sounds: Normal breath sounds  Abdominal:      General: Bowel sounds are normal       Palpations: Abdomen is soft  Tenderness: There is no abdominal tenderness  Musculoskeletal:         General: Tenderness present  No swelling, deformity or signs of injury  Right shoulder: No swelling or deformity  Decreased range of motion  Left shoulder: No swelling or deformity  Decreased range of motion  Cervical back: Normal range of motion  No tenderness  Right knee: Normal       Left knee: Normal       Right lower leg: No edema  Skin:     General: Skin is warm and dry  Neurological:      General: No focal deficit present  Mental Status: He is alert and oriented to person, place, and time  Psychiatric:         Mood and Affect: Mood normal          Behavior: Behavior normal          Thought Content:  Thought content normal          Judgment: Judgment normal

## 2022-04-04 NOTE — ASSESSMENT & PLAN NOTE
BMI Counseling: Body mass index is 30 12 kg/m²  The BMI is above normal  Nutrition recommendations include reducing portion sizes, decreasing overall calorie intake, 3-5 servings of fruits/vegetables daily, reducing fast food intake, consuming healthier snacks, decreasing soda and/or juice intake, moderation in carbohydrate intake, increasing intake of lean protein, reducing intake of saturated fat and trans fat and reducing intake of cholesterol  Exercise recommendations include moderate aerobic physical activity for 150 minutes/week

## 2022-04-05 LAB
B BURGDOR IGG+IGM SER-ACNC: 42
ENDOMYSIUM IGA SER QL: NEGATIVE
GLIADIN PEPTIDE IGA SER-ACNC: 10 UNITS (ref 0–19)
GLIADIN PEPTIDE IGG SER-ACNC: 2 UNITS (ref 0–19)
IGA SERPL-MCNC: 315 MG/DL (ref 90–386)
RHEUMATOID FACT SER QL LA: NEGATIVE
TESTOST FREE SERPL-MCNC: 5.3 PG/ML (ref 7.2–24)
TESTOST SERPL-MCNC: 359 NG/DL (ref 264–916)
TTG IGA SER-ACNC: 3 U/ML (ref 0–3)
TTG IGG SER-ACNC: 5 U/ML (ref 0–5)

## 2022-04-06 LAB — RYE IGE QN: NEGATIVE

## 2022-04-11 ENCOUNTER — APPOINTMENT (OUTPATIENT)
Dept: LAB | Facility: CLINIC | Age: 57
End: 2022-04-11
Payer: MEDICARE

## 2022-04-11 ENCOUNTER — HOSPITAL ENCOUNTER (OUTPATIENT)
Dept: RADIOLOGY | Facility: HOSPITAL | Age: 57
Discharge: HOME | End: 2022-04-11
Payer: MEDICARE

## 2022-04-11 DIAGNOSIS — M25.511 PAIN OF BOTH SHOULDER JOINTS: ICD-10-CM

## 2022-04-11 DIAGNOSIS — M25.512 PAIN OF BOTH SHOULDER JOINTS: ICD-10-CM

## 2022-04-11 DIAGNOSIS — R79.89 LOW TESTOSTERONE: ICD-10-CM

## 2022-04-11 PROCEDURE — 36415 COLL VENOUS BLD VENIPUNCTURE: CPT

## 2022-04-11 PROCEDURE — 73030 X-RAY EXAM OF SHOULDER: CPT

## 2022-04-11 PROCEDURE — 84402 ASSAY OF FREE TESTOSTERONE: CPT

## 2022-04-11 PROCEDURE — 84403 ASSAY OF TOTAL TESTOSTERONE: CPT

## 2022-04-12 LAB
TESTOST FREE SERPL-MCNC: 7.7 PG/ML (ref 7.2–24)
TESTOST SERPL-MCNC: 411 NG/DL (ref 264–916)

## 2022-04-13 DIAGNOSIS — N52.9 ERECTILE DYSFUNCTION, UNSPECIFIED ERECTILE DYSFUNCTION TYPE: ICD-10-CM

## 2022-04-13 RX ORDER — TADALAFIL 5 MG/1
5 TABLET ORAL DAILY PRN
Qty: 30 TABLET | Refills: 0 | Status: SHIPPED | OUTPATIENT
Start: 2022-04-13 | End: 2022-07-05 | Stop reason: SDUPTHER

## 2022-07-05 DIAGNOSIS — N52.9 ERECTILE DYSFUNCTION, UNSPECIFIED ERECTILE DYSFUNCTION TYPE: ICD-10-CM

## 2022-07-05 RX ORDER — TADALAFIL 5 MG/1
5 TABLET ORAL DAILY PRN
Qty: 30 TABLET | Refills: 0 | Status: SHIPPED | OUTPATIENT
Start: 2022-07-05 | End: 2022-08-25 | Stop reason: SDUPTHER

## 2022-08-25 DIAGNOSIS — G89.29 CHRONIC PAIN OF BOTH KNEES: ICD-10-CM

## 2022-08-25 DIAGNOSIS — M25.511 PAIN OF BOTH SHOULDER JOINTS: ICD-10-CM

## 2022-08-25 DIAGNOSIS — M25.512 PAIN OF BOTH SHOULDER JOINTS: ICD-10-CM

## 2022-08-25 DIAGNOSIS — M25.561 CHRONIC PAIN OF BOTH KNEES: ICD-10-CM

## 2022-08-25 DIAGNOSIS — M25.562 CHRONIC PAIN OF BOTH KNEES: ICD-10-CM

## 2022-08-25 DIAGNOSIS — N52.9 ERECTILE DYSFUNCTION, UNSPECIFIED ERECTILE DYSFUNCTION TYPE: ICD-10-CM

## 2022-08-26 RX ORDER — TADALAFIL 5 MG/1
5 TABLET ORAL DAILY PRN
Qty: 30 TABLET | Refills: 0 | Status: SHIPPED | OUTPATIENT
Start: 2022-08-26 | End: 2022-09-25

## 2022-11-01 ENCOUNTER — HOSPITAL ENCOUNTER (INPATIENT)
Facility: HOSPITAL | Age: 57
LOS: 2 days | Discharge: HOME | End: 2022-11-03
Attending: EMERGENCY MEDICINE | Admitting: INTERNAL MEDICINE

## 2022-11-01 ENCOUNTER — APPOINTMENT (EMERGENCY)
Dept: CT IMAGING | Facility: HOSPITAL | Age: 57
End: 2022-11-01

## 2022-11-01 DIAGNOSIS — G89.29 CHRONIC PAIN OF BOTH KNEES: ICD-10-CM

## 2022-11-01 DIAGNOSIS — E78.5 HYPERLIPIDEMIA, UNSPECIFIED HYPERLIPIDEMIA TYPE: ICD-10-CM

## 2022-11-01 DIAGNOSIS — R29.90 STROKE-LIKE SYMPTOMS: ICD-10-CM

## 2022-11-01 DIAGNOSIS — N52.9 ERECTILE DYSFUNCTION, UNSPECIFIED ERECTILE DYSFUNCTION TYPE: ICD-10-CM

## 2022-11-01 DIAGNOSIS — M25.561 CHRONIC PAIN OF BOTH KNEES: ICD-10-CM

## 2022-11-01 DIAGNOSIS — R29.810 FACIAL WEAKNESS: Primary | ICD-10-CM

## 2022-11-01 DIAGNOSIS — I45.10 RBBB: ICD-10-CM

## 2022-11-01 DIAGNOSIS — M25.562 CHRONIC PAIN OF BOTH KNEES: ICD-10-CM

## 2022-11-01 PROBLEM — R20.0 LEFT FACIAL NUMBNESS: Status: ACTIVE | Noted: 2022-11-01

## 2022-11-01 PROBLEM — R20.2 NUMBNESS AND TINGLING: Status: ACTIVE | Noted: 2022-11-01

## 2022-11-01 LAB
2HR DELTA HS TROPONIN: -1 NG/L
4HR DELTA HS TROPONIN: 0 NG/L
ANION GAP SERPL CALCULATED.3IONS-SCNC: 6 MMOL/L (ref 4–13)
APTT PPP: 25 SECONDS (ref 23–37)
ATRIAL RATE: 68 BPM
ATRIAL RATE: 72 BPM
BUN SERPL-MCNC: 15 MG/DL (ref 5–25)
CALCIUM SERPL-MCNC: 9 MG/DL (ref 8.3–10.1)
CARDIAC TROPONIN I PNL SERPL HS: 3 NG/L
CARDIAC TROPONIN I PNL SERPL HS: 4 NG/L
CARDIAC TROPONIN I PNL SERPL HS: 4 NG/L
CHLORIDE SERPL-SCNC: 107 MMOL/L (ref 96–108)
CO2 SERPL-SCNC: 30 MMOL/L (ref 21–32)
CREAT SERPL-MCNC: 1.12 MG/DL (ref 0.6–1.3)
CRP SERPL QL: <3 MG/L
ERYTHROCYTE [DISTWIDTH] IN BLOOD BY AUTOMATED COUNT: 13.5 % (ref 11.6–15.1)
GFR SERPL CREATININE-BSD FRML MDRD: 73 ML/MIN/1.73SQ M
GLUCOSE SERPL-MCNC: 115 MG/DL (ref 65–140)
GLUCOSE SERPL-MCNC: 135 MG/DL (ref 65–140)
HCT VFR BLD AUTO: 43.4 % (ref 36.5–49.3)
HGB BLD-MCNC: 14.4 G/DL (ref 12–17)
INR PPP: 1.02 (ref 0.84–1.19)
MCH RBC QN AUTO: 31.4 PG (ref 26.8–34.3)
MCHC RBC AUTO-ENTMCNC: 33.2 G/DL (ref 31.4–37.4)
MCV RBC AUTO: 95 FL (ref 82–98)
P AXIS: 53 DEGREES
P AXIS: 94 DEGREES
PLATELET # BLD AUTO: 221 THOUSANDS/UL (ref 149–390)
PMV BLD AUTO: 10.5 FL (ref 8.9–12.7)
POTASSIUM SERPL-SCNC: 3.9 MMOL/L (ref 3.5–5.3)
PR INTERVAL: 142 MS
PR INTERVAL: 158 MS
PROTHROMBIN TIME: 13.4 SECONDS (ref 11.6–14.5)
QRS AXIS: -74 DEGREES
QRS AXIS: -78 DEGREES
QRSD INTERVAL: 128 MS
QRSD INTERVAL: 138 MS
QT INTERVAL: 394 MS
QT INTERVAL: 394 MS
QTC INTERVAL: 418 MS
QTC INTERVAL: 431 MS
RBC # BLD AUTO: 4.59 MILLION/UL (ref 3.88–5.62)
SODIUM SERPL-SCNC: 143 MMOL/L (ref 135–147)
T WAVE AXIS: 20 DEGREES
T WAVE AXIS: 36 DEGREES
VENTRICULAR RATE: 68 BPM
VENTRICULAR RATE: 72 BPM
WBC # BLD AUTO: 6.1 THOUSAND/UL (ref 4.31–10.16)

## 2022-11-01 RX ORDER — ATORVASTATIN CALCIUM 40 MG/1
40 TABLET, FILM COATED ORAL EVERY EVENING
Status: DISCONTINUED | OUTPATIENT
Start: 2022-11-01 | End: 2022-11-03 | Stop reason: HOSPADM

## 2022-11-01 RX ORDER — ASPIRIN 325 MG
325 TABLET ORAL ONCE
Status: COMPLETED | OUTPATIENT
Start: 2022-11-01 | End: 2022-11-01

## 2022-11-01 RX ORDER — ASPIRIN 81 MG/1
81 TABLET ORAL DAILY
COMMUNITY

## 2022-11-01 RX ORDER — ASPIRIN 81 MG/1
81 TABLET, CHEWABLE ORAL DAILY
Status: DISCONTINUED | OUTPATIENT
Start: 2022-11-02 | End: 2022-11-03 | Stop reason: HOSPADM

## 2022-11-01 RX ORDER — ENOXAPARIN SODIUM 100 MG/ML
40 INJECTION SUBCUTANEOUS
Status: DISCONTINUED | OUTPATIENT
Start: 2022-11-02 | End: 2022-11-03 | Stop reason: HOSPADM

## 2022-11-01 RX ADMIN — ASPIRIN 325 MG ORAL TABLET 325 MG: 325 PILL ORAL at 16:05

## 2022-11-01 RX ADMIN — IOHEXOL 100 ML: 350 INJECTION, SOLUTION INTRAVENOUS at 15:29

## 2022-11-01 NOTE — CONSULTS
Consultation - Neurology   Binh Perrin 65 y o  male MRN: [id number]  Unit/Bed#: ED 27 Encounter: [id number]      Assessment/Plan     Numbness and tingling  Assessment & Plan  65 y o  male with TOM, HLD, obesity, tobacco use, and chronic pain, presented to the ED for evaluation of multiple complaints:     · Patient reports that he woke up with chest pressure and dyspnea on exertion  · Reports feeling like his lips were being pulled to the right (transient)  · Developed numbness/tingling on left side of face with extension into the left shoulder area  · Symptoms were at their peak this AM/upon awakening, but reports that he decided to "get it checked out before something worse happens"    A stroke alert was called in the ED  BP on arrival 148/112  NIHSS 1 for mild distal sensory loss (in the RIGHT lower extremity)  CT/CTA were unremarkable  TNK was deferred given that patient was outside the window (symptoms were present upon awakening), lower suspicion for vascular event, improving symptoms, and low NIH  Cannot entirely exclude stroke given patient's risk factors, therefore will obtain MRI, however would consider cardiac evaluation as well  Plan:  - Stroke pathway  • Obtain MRI brain w and wo contrast  • Echo   • Lipid Panel  • Hemoglobin A1c  • Load with aspirin 325 mg, then continue on 81 mg daily  • Atorvastatin 40 mg  • Permissive HTN, labetalol if SBP >200  • Telemetry  • PT/OT/ST  • Frequent neuro checks  Continue to monitor and notify neurology with any changes  - Medical management and supportive care per primary team  Correction of any metabolic or infectious disturbances  Recommendations for outpatient neurological follow up have yet to be determined        History of Present Illness   Reason for Consult / Principal Problem: Stroke alert  Patient last known well: When he went to bed on 10/31  Stroke alert called: [facility]  Neurology time of arrival: Neuro response immediate by phone, present at bedside at 1512    Reason for Consult / Principal Problem: Stroke Alert  Hx and PE limited by: Language barrier  HPI: Taylor Cabrera is a 65 y o  male with TOM, HLD, obesity, tobacco use, and chronic pain, who presented to the ED due to multiple symptoms  Patient reports that he woke up with chest pressure, that is worse on exertion, as well as numbness and tingling on the left side of his face and left shoulder area  He also reports dyspnea on exertion  He felt like his lips were being pulled to the right this morning  That has since resolved  He reports some pain in the back of his head  Patient denies any other focal neurologic symptoms  A stroke alert was activated in the ED   NIHSS 1 for diminished sensation, though this is in the right lower extremity distally and unrelated to his presenting complaints  Blood pressure on arrival 148/112  Other vital signs stable  CT/CTA were unremarkable  Inpatient consult to Neurology  Consult performed by: Yeimi Galvin PA-C  Consult ordered by: Joana Davis MD          Review of Systems   A 12 system ROS was completed  Other than the above mentioned complaints in the HPI and those commented on below, all remaining systems were negative:  - Restless leg syndrome, bilateral lower extremities (present at night)      Historical Information   Past Medical History:   Diagnosis Date   • Hyperlipidemia    • Pneumonia due to COVID-19 virus 01/13/2022     History reviewed  No pertinent surgical history    Social History   Social History     Substance and Sexual Activity   Alcohol Use No   • Alcohol/week: 1 0 standard drink   • Types: 1 Cans of beer per week     Social History     Substance and Sexual Activity   Drug Use No     E-Cigarette/Vaping   • E-Cigarette Use Never User      E-Cigarette/Vaping Substances     Social History     Tobacco Use   Smoking Status Former Smoker   • Types: Cigarettes   Smokeless Tobacco Never Used     Family History: History reviewed  No pertinent family history  Review of previous medical records was completed  Meds/Allergies   all current active meds have been reviewed, current meds:   Current Facility-Administered Medications   Medication Dose Route Frequency   • aspirin tablet 325 mg  325 mg Oral Once    and PTA meds:   Prior to Admission Medications   Prescriptions Last Dose Informant Patient Reported? Taking? Diclofenac Sodium (VOLTAREN) 1 %   No No   Sig: Apply 2 g topically 4 (four) times a day as needed (knee pain)   cyclobenzaprine (FLEXERIL) 10 mg tablet   No No   Sig: Take 1 tablet (10 mg total) by mouth daily at bedtime   tadalafil (CIALIS) 5 MG tablet   No No   Sig: Take 1 tablet (5 mg total) by mouth daily as needed for erectile dysfunction      Facility-Administered Medications: None       No Known Allergies    Objective   Vitals:Blood pressure 144/80, pulse 80, temperature 98 4 °F (36 9 °C), temperature source Oral, resp  rate 18, weight 93 3 kg (205 lb 11 oz), SpO2 98 %  ,Body mass index is 31 27 kg/m²  No intake or output data in the 24 hours ending 22 1601    Invasive Devices: Invasive Devices  Report    Peripheral Intravenous Line  Duration           Peripheral IV 22 Right Antecubital <1 day                  NIHSS:  1a Level of Consciousness: 0 = Alert   1b  LOC Questions: 0 = Answers both correctly   1c  LOC Commands: 0 = Obeys both correctly   2  Best Gaze: 0 = Normal   3  Visual: 0 = No visual field loss   4  Facial Palsy: 0=Normal symmetric movement   5a  Motor Right Arm: 0=No drift, limb holds 90 (or 45) degrees for full 10 seconds   5b  Motor Left Arm: 0=No drift, limb holds 90 (or 45) degrees for full 10 seconds   6a  Motor Right Le=No drift, limb holds 90 (or 45) degrees for full 10 seconds   6b  Motor Left Le=No drift, limb holds 90 (or 45) degrees for full 10 seconds   7  Limb Ataxia:  0=Absent   8   Sensory: 1=Mild to moderate sensory loss; patient feels pinprick is less sharp or is dull on the affected side; there is a loss of superficial pain with pinprick but patient is aware He is being touched   9  Best Language:  0=No aphasia, normal   10  Dysarthria: 0=Normal articulation   11  Extinction and Inattention (formerly Neglect): 0=No abnormality   Total Score: 1     MRS Score: 0-1 (some chronic pain)      Physical Exam  Vitals and nursing note reviewed  Constitutional:       General: He is not in acute distress  Appearance: He is not ill-appearing or diaphoretic  HENT:      Head: Normocephalic and atraumatic  Right Ear: External ear normal       Left Ear: External ear normal       Nose: Nose normal       Mouth/Throat:      Mouth: Mucous membranes are moist       Pharynx: Oropharynx is clear  Eyes:      General: No scleral icterus  Right eye: No discharge  Left eye: No discharge  Extraocular Movements: Extraocular movements intact and EOM normal       Conjunctiva/sclera: Conjunctivae normal    Cardiovascular:      Rate and Rhythm: Normal rate  Pulmonary:      Effort: Pulmonary effort is normal  No respiratory distress  Musculoskeletal:         General: No swelling or tenderness  Cervical back: Normal range of motion and neck supple  Right lower leg: No edema  Left lower leg: No edema  Skin:     General: Skin is warm and dry  Findings: No lesion or rash  Neurological:      Mental Status: He is alert and oriented to person, place, and time  Coordination: Finger-Nose-Finger Test normal       Gait: Gait is intact  Deep Tendon Reflexes: Strength normal       Comments: Detailed below   Psychiatric:         Mood and Affect: Mood normal          Speech: Speech normal          Behavior: Behavior normal          Thought Content: Thought content normal          Judgment: Judgment normal        Neurologic Exam     Mental Status   Oriented to person, place, and time     Speech: speech is normal   Level of consciousness: alert  Able to name object  Able to repeat  Cranial Nerves     CN II   Visual fields full to confrontation  CN III, IV, VI   Extraocular motions are normal    Nystagmus: none   Upgaze: normal  Downgaze: normal  Conjugate gaze: present    CN V   Facial sensation intact  CN VII   Facial expression full, symmetric  CN VIII   Hearing: intact    CN XII   Tongue: not atrophic  Fasciculations: absent  Tongue deviation: none    Face symmetric at rest and with smiling  Eyelid closure strong   Symmetric eyebrow raise      Motor Exam   Muscle bulk: normal  Overall muscle tone: normal    Strength   Strength 5/5 throughout  Sensory Exam   Light touch normal    Right arm pinprick: normal  Left arm pinprick: normal  Right leg pinprick: decreased from ankle  Left leg pinprick: normal    Gait, Coordination, and Reflexes     Gait  Gait: normal    Coordination   Finger to nose coordination: normal    Tremor   Resting tremor: absent  Intention tremor: absent  Action tremor: absent    Reflexes   Right ankle clonus: absent  Left pendular knee jerk: absent       Lab Results: I have personally reviewed pertinent reports  Imaging Studies: I have personally reviewed pertinent reports  and I have personally reviewed pertinent films in PACS  EKG, Pathology, and Other Studies: I have personally reviewed pertinent reports  VTE Prophylaxis: None- patient currently in ED       Code Status: No Order     Counseling / Coordination of Care  Critical care billing per attending neurologist

## 2022-11-01 NOTE — PLAN OF CARE
Problem: Potential for Falls  Goal: Patient will remain free of falls  Description: INTERVENTIONS:  - Educate patient/family on patient safety including physical limitations  - Instruct patient to call for assistance with activity   - Consult OT/PT to assist with strengthening/mobility   - Keep Call bell within reach  - Keep bed low and locked with side rails adjusted as appropriate  - Keep care items and personal belongings within reach  - Initiate and maintain comfort rounds  - Make Fall Risk Sign visible to staff  - Offer Toileting every 2 Hours, in advance of need  - Initiate/Maintain alarm  - Obtain necessary fall risk management equipment:  - Apply yellow socks and bracelet for high fall risk patients  - Consider moving patient to room near nurses station  Outcome: Progressing     Problem: Neurological Deficit  Goal: Neurological status is stable or improving  Description: Interventions:  - Monitor and assess patient's level of consciousness, motor function, sensory function, and level of assistance needed for ADLs  - Monitor and report changes from baseline  Collaborate with interdisciplinary team to initiate plan and implement interventions as ordered  - Provide and maintain a safe environment  - Consider seizure precautions  - Consider fall precautions  - Consider aspiration precautions  - Consider bleeding precautions    Outcome: Progressing     Problem: SAFETY ADULT  Goal: Patient will remain free of falls  Description: INTERVENTIONS:  - Educate patient/family on patient safety including physical limitations  - Instruct patient to call for assistance with activity   - Consult OT/PT to assist with strengthening/mobility   - Keep Call bell within reach  - Keep bed low and locked with side rails adjusted as appropriate  - Keep care items and personal belongings within reach  - Initiate and maintain comfort rounds  - Make Fall Risk Sign visible to staff  - Offer Toileting every 2 Hours, in advance of need  - Initiate/Maintain alarm  - Obtain necessary fall risk management equipment:  - Apply yellow socks and bracelet for high fall risk patients  - Consider moving patient to room near nurses station  Outcome: Progressing  Goal: Maintain or return to baseline ADL function  Description: INTERVENTIONS:  -  Assess patient's ability to carry out ADLs; assess patient's baseline for ADL function and identify physical deficits which impact ability to perform ADLs (bathing, care of mouth/teeth, toileting, grooming, dressing, etc )  - Assess/evaluate cause of self-care deficits   - Assess range of motion  - Assess patient's mobility; develop plan if impaired  - Assess patient's need for assistive devices and provide as appropriate  - Encourage maximum independence but intervene and supervise when necessary  - Involve family in performance of ADLs  - Assess for home care needs following discharge   - Consider OT consult to assist with ADL evaluation and planning for discharge  - Provide patient education as appropriate  Outcome: Progressing  Goal: Maintains/Returns to pre admission functional level  Description: INTERVENTIONS:  - Perform BMAT or MOVE assessment daily    - Set and communicate daily mobility goal to care team and patient/family/caregiver  - Collaborate with rehabilitation services on mobility goals if consulted  - Perform Range of Motion 3 times a day  - Reposition patient every 2 hours    - Dangle patient 3 times a day  - Stand patient 3 times a day  - Ambulate patient 3 times a day  - Out of bed to chair 3 times a day   - Out of bed for meals 3 times a day  - Out of bed for toileting  - Record patient progress and toleration of activity level   Outcome: Progressing     Problem: Knowledge Deficit  Goal: Patient/family/caregiver demonstrates understanding of disease process, treatment plan, medications, and discharge instructions  Description: Complete learning assessment and assess knowledge base   Interventions:  - Provide teaching at level of understanding  - Provide teaching via preferred learning methods  Outcome: Progressing

## 2022-11-01 NOTE — ED PROVIDER NOTES
History  Chief Complaint   Patient presents with   • Facial Numbness     Pt reports left sided head, face and leg pain, pt reports this morning his lips feel funny and at times deviate to the right - Pt reports started last week but got worse this morning  Pt reports Pain to left upper arm as well hx of stroke in past pt reports this feels similar to previous stroke     Patient is a 80-year-old male  He did have a history of a stroke in 2003  He has a history of hyperlipidemia  He is a former smoker  It does not sound like he was left with any permanent deficits  Last night he went to bed well  This morning awoke with left-sided facial weakness  He tried to gargle this morning in water leaked out of his mouth  He is a chronic pain patient  However, he does not have any new motor or sensory complaints in the extremities  No visual complaints  Last known well time was 11:30 p m  He awoke with symptoms this morning  Symptoms are moderate in severity  No aggravating or relieving factors  No back pain  Prior to Admission Medications   Prescriptions Last Dose Informant Patient Reported? Taking? Diclofenac Sodium (VOLTAREN) 1 %   No No   Sig: Apply 2 g topically 4 (four) times a day as needed (knee pain)   cyclobenzaprine (FLEXERIL) 10 mg tablet   No No   Sig: Take 1 tablet (10 mg total) by mouth daily at bedtime   tadalafil (CIALIS) 5 MG tablet   No No   Sig: Take 1 tablet (5 mg total) by mouth daily as needed for erectile dysfunction      Facility-Administered Medications: None       Past Medical History:   Diagnosis Date   • Hyperlipidemia    • Pneumonia due to COVID-19 virus 01/13/2022       History reviewed  No pertinent surgical history  History reviewed  No pertinent family history  I have reviewed and agree with the history as documented      E-Cigarette/Vaping   • E-Cigarette Use Never User      E-Cigarette/Vaping Substances     Social History     Tobacco Use   • Smoking status: Former Smoker     Types: Cigarettes   • Smokeless tobacco: Never Used   Vaping Use   • Vaping Use: Never used   Substance Use Topics   • Alcohol use: No     Alcohol/week: 1 0 standard drink     Types: 1 Cans of beer per week   • Drug use: No       Review of Systems   Constitutional: Negative for chills and fever  HENT: Negative for rhinorrhea and sore throat  Eyes: Negative for pain, redness and visual disturbance  Respiratory: Positive for shortness of breath  Negative for cough  Cardiovascular: Positive for chest pain  Negative for leg swelling  Gastrointestinal: Negative for abdominal pain, diarrhea and vomiting  Endocrine: Negative for polydipsia and polyuria  Genitourinary: Negative for dysuria, frequency and hematuria  Musculoskeletal: Negative for back pain and neck pain  Skin: Negative for rash and wound  Allergic/Immunologic: Negative for immunocompromised state  Neurological: Positive for weakness  Negative for numbness and headaches  Psychiatric/Behavioral: Negative for hallucinations and suicidal ideas  All other systems reviewed and are negative  Physical Exam  Physical Exam  Vitals reviewed  Constitutional:       General: He is not in acute distress  Appearance: He is not toxic-appearing  HENT:      Head: Normocephalic and atraumatic  Nose: Nose normal       Mouth/Throat:      Mouth: Mucous membranes are moist    Eyes:      General:         Right eye: No discharge  Left eye: No discharge  Conjunctiva/sclera: Conjunctivae normal    Cardiovascular:      Rate and Rhythm: Normal rate and regular rhythm  Pulses: Normal pulses  Heart sounds: Normal heart sounds  No murmur heard  No friction rub  No gallop  Pulmonary:      Effort: Pulmonary effort is normal  No respiratory distress  Breath sounds: Normal breath sounds  No stridor  No wheezing, rhonchi or rales     Abdominal:      General: Bowel sounds are normal  There is no distension  Palpations: Abdomen is soft  Tenderness: There is no abdominal tenderness  There is no right CVA tenderness, left CVA tenderness, guarding or rebound  Musculoskeletal:         General: No swelling, tenderness, deformity or signs of injury  Normal range of motion  Cervical back: Normal range of motion and neck supple  No rigidity  Right lower leg: No edema  Left lower leg: No edema  Comments: No calf pain or unilateral leg swelling   Skin:     General: Skin is warm and dry  Coloration: Skin is not jaundiced or pale  Findings: No bruising, erythema or rash  Neurological:      General: No focal deficit present  Mental Status: He is alert and oriented to person, place, and time  GCS: GCS eye subscore is 4  GCS verbal subscore is 5  GCS motor subscore is 6  Cranial Nerves: Facial asymmetry present  Sensory: No sensory deficit  Motor: Motor function is intact  Comments: There is a slight left facial droop  There also appears to be some slight weakness with closing of the left eye  The forehead appears spared     Psychiatric:         Mood and Affect: Mood normal          Behavior: Behavior normal          Vital Signs  ED Triage Vitals [11/01/22 1445]   Temperature Pulse Respirations Blood Pressure SpO2   98 4 °F (36 9 °C) 81 16 (!) 148/112 98 %      Temp Source Heart Rate Source Patient Position - Orthostatic VS BP Location FiO2 (%)   Oral Monitor Sitting Right arm --      Pain Score       --           Vitals:    11/01/22 1445 11/01/22 1532 11/01/22 1627   BP: (!) 148/112 144/80 139/72   Pulse: 81 80 65   Patient Position - Orthostatic VS: Sitting  Lying         Visual Acuity  Visual Acuity    Flowsheet Row Most Recent Value   L Pupil Size (mm) 3   R Pupil Size (mm) 3          ED Medications  Medications   iohexol (OMNIPAQUE) 350 MG/ML injection (SINGLE-DOSE) 100 mL (100 mL Intravenous Given 11/1/22 1529)   aspirin tablet 325 mg (325 mg Oral Given 11/1/22 1605)       Diagnostic Studies  Results Reviewed     Procedure Component Value Units Date/Time    HS Troponin I 4hr [[id number]]     Lab Status: No result Specimen: Blood     HS Troponin 0hr (reflex protocol) [[id number]]  (Normal) Collected: 11/01/22 1511    Lab Status: Final result Specimen: Blood from Arm, Right Updated: 11/01/22 1542     hs TnI 0hr 4 ng/L     HS Troponin I 2hr [[id number]]     Lab Status: No result Specimen: Blood     Basic metabolic panel [[id number]] Collected: 11/01/22 1511    Lab Status: Final result Specimen: Blood from Arm, Right Updated: 11/01/22 1529     Sodium 143 mmol/L      Potassium 3 9 mmol/L      Chloride 107 mmol/L      CO2 30 mmol/L      ANION GAP 6 mmol/L      BUN 15 mg/dL      Creatinine 1 12 mg/dL      Glucose 115 mg/dL      Calcium 9 0 mg/dL      eGFR 73 ml/min/1 73sq m     Narrative:      [place] guidelines for Chronic Kidney Disease (CKD):   •  Stage 1 with normal or high GFR (GFR > 90 mL/min/1 73 square meters)  •  Stage 2 Mild CKD (GFR = 60-89 mL/min/1 73 square meters)  •  Stage 3A Moderate CKD (GFR = 45-59 mL/min/1 73 square meters)  •  Stage 3B Moderate CKD (GFR = 30-44 mL/min/1 73 square meters)  •  Stage 4 Severe CKD (GFR = 15-29 mL/min/1 73 square meters)  •  Stage 5 End Stage CKD (GFR <15 mL/min/1 73 square meters)  Note: GFR calculation is accurate only with a steady state creatinine    Protime-INR [[id number]]  (Normal) Collected: 11/01/22 1511    Lab Status: Final result Specimen: Blood from Arm, Right Updated: 11/01/22 1529     Protime 13 4 seconds      INR 1 02    APTT [[id number]]  (Normal) Collected: 11/01/22 1511    Lab Status: Final result Specimen: Blood from Arm, Right Updated: 11/01/22 1529     PTT 25 seconds     CBC and Platelet [[id number]]  (Normal) Collected: 11/01/22 1511    Lab Status: Final result Specimen: Blood from Arm, Right Updated: 11/01/22 1518     WBC 6 10 Thousand/uL      RBC 4 59 Million/uL Hemoglobin 14 4 g/dL      Hematocrit 43 4 %      MCV 95 fL      MCH 31 4 pg      MCHC 33 2 g/dL      RDW 13 5 %      Platelets 021 Thousands/uL      MPV 10 5 fL     Lyme Antibody Profile with reflex to [facility] [[id number]] Collected: 11/01/22 1511    Lab Status: In process Specimen: Blood from Arm, Right Updated: 11/01/22 1514    Fingerstick Glucose (POCT) [[id number]]  (Normal) Collected: 11/01/22 1510    Lab Status: Final result Updated: 11/01/22 1511     POC Glucose 135 mg/dl                  CTA stroke alert (head/neck)   Final Result by Kelly Mathew MD (11/01 1551)      No evidence of hemodynamic significant stenosis, aneurysm or dissection  I personally discussed this study with Dr Regis Ashley on 11/1/2022 at 3:36 PM                         Workstation performed: [id number]         CT stroke alert brain   Final Result by Kelly Mathew MD (11/01 1537)      No acute intracranial abnormality  I personally discussed this study with Dr Regis Ashley on 11/1/2022 at 3:36 PM                 Workstation performed: [id number]                    Procedures  ECG 12 Lead Documentation Only    Date/Time: 11/1/2022 3:13 PM  Performed by: Anuradha Ferguson MD  Authorized by: Anuradha Ferguson MD     ECG reviewed by me, the ED Provider: yes    Patient location:  ED  Comments:      Normal sinus rhythm at 72 beats per minute  Right bundle branch block  Left anterior fascicular block  Bifascicular block  Abnormal EKG  Similar to prior EKGs  No STEMI      CriticalCare Time  Performed by: Anuradha Ferguson MD  Authorized by: Anuradha Ferguson MD     Critical care provider statement:     Critical care time (minutes):  60    Critical care time was exclusive of:  Separately billable procedures and treating other patients    Critical care was necessary to treat or prevent imminent or life-threatening deterioration of the following conditions:  CNS failure or compromise    Critical care was time spent personally by me on the following activities:  Obtaining history from patient or surrogate, development of treatment plan with patient or surrogate, discussions with consultants, evaluation of patient's response to treatment, examination of patient, ordering and performing treatments and interventions, ordering and review of laboratory studies, ordering and review of radiographic studies and re-evaluation of patient's condition    I assumed direction of critical care for this patient from another provider in my specialty: no               ED Course                  Stroke Assessment     Row Name 11/01/22 1510             NIH Stroke Scale    Interval Baseline      Level of Consciousness (1a ) 0      LOC Questions (1b ) 0      LOC Commands (1c ) 0      Best Gaze (2 ) 0      Visual (3 ) 0      Facial Palsy (4 ) 1      Motor Arm, Left (5a ) 0      Motor Arm, Right (5b ) 0      Motor Leg, Left (6a ) 0      Motor Leg, Right (6b ) 0      Limb Ataxia (7 ) 0      Sensory (8 ) 0      Best Language (9 ) 0      Dysarthria (10 ) 0      Extinction and Inattention (11 ) (Formerly Neglect) 0      Total 1              Flowsheet Row Most Recent Value   Thrombolytic Decision Options    Thrombolytic Decision Patient not a candidate  MDM  Number of Diagnoses or Management Options  Facial weakness  Diagnosis management comments: Differential diagnosis here includes stroke and Bell's palsy  The forehead appears to be spared  Symptoms are under 24 hours in duration  Stroke alert called  Neurology did see patient  Not tPA candidate  Admit for MRI  Neurology recommended aspirin  Consulted with hospitalist for admission  EKG showed bifascicular block  No acute ischemic changes  Troponin was negative  Laboratory evaluation was otherwise unremarkable  CTA of the head neck did not show any acute stroke or intracranial hemorrhage  No dissection  No acute clot or significant stenosis         Amount and/or Complexity of Data Reviewed  Clinical lab tests: reviewed and ordered  Tests in the radiology section of CPT®: ordered and reviewed  Discuss the patient with other providers: yes  Independent visualization of images, tracings, or specimens: yes        Disposition  Final diagnoses:   Facial weakness     Time reflects when diagnosis was documented in both MDM as applicable and the Disposition within this note     Time User Action Codes Description Comment    11/1/2022  3:07 PM Maged Stafford Add [Z06 [id number]] Facial weakness       ED Disposition     ED Disposition   Admit    Condition   Stable    Date/Time   Tue Nov 1, 2022  4:49 PM    Comment              Follow-up Information    None         Patient's Medications   Discharge Prescriptions    No medications on file       No discharge procedures on file      PDMP Review       Value Time User    PDMP Reviewed  Yes 9/17/2021 10:28 AM Kye Conde PA-C          ED Provider  Electronically Signed by           Arta Carrel, MD  11/01/22 [phone]

## 2022-11-01 NOTE — ASSESSMENT & PLAN NOTE
65 y o  male with TOM, HLD, obesity, tobacco use, and chronic pain, presented to the ED for evaluation of multiple complaints:     · Patient reports that he woke up with chest pressure and dyspnea on exertion  · Reports feeling like his lips were being pulled to the right (transient- no longer reporting once in the ED)  · Developed numbness/tingling on left side of face with extension into the left shoulder area  · Symptoms were at their peak upon awakening the morning of admission, and then improved, but reports that he decided to "get it checked out before something worse happens"  · ED concerned for Bell's Palsy, but stroke alert activated  · BP on arrival 148/112  NIHSS 1 for mild distal sensory loss in the RLE  CT/CTA were unremarkable  · No evidence of facial droop in ED  Symmetric eyelid closure strength  · TNK deferred- patient outside window, symptoms improving, and low suspicion for vascular event  Today, patient has a very subtle droop of the left side of his upper lip, which corrects upon smiling  Eyelid closure strength remains intact  He denies any sensory deficits  MRI Brain revealed enhancement of the 7th cranial nerve, suggesting Bell's Palsy  No evidence of stroke  Plan:  - Can defer remainder of stroke pathway  - Reviewed serum labs:   · Vitamin B12 on low end of normal- recommend supplementation  · Elevated TSH, low T4- defer to primary team   · - Continue on atorvastatin 40 mg daily    · Lyme negative   - Patient wishes to try medications that may speed up recovery despite minimal deficits- Recommend prednisone 60 mg x 7 days and Valacyclovir 1g TID x 7 days  - At this time, no complaints of eye irritation and eyelid closure strength is full   If eyelid strength becomes weak or patient expresses complaints regarding dry eye, recommend artificial tears 4x daily and ointment QHS  - Does not require aspirin from a neuro standpoint  - Medical management and supportive care per primary team  Correction of any metabolic or infectious disturbances

## 2022-11-02 ENCOUNTER — APPOINTMENT (INPATIENT)
Dept: MRI IMAGING | Facility: HOSPITAL | Age: 57
End: 2022-11-02

## 2022-11-02 ENCOUNTER — APPOINTMENT (INPATIENT)
Dept: NON INVASIVE DIAGNOSTICS | Facility: HOSPITAL | Age: 57
End: 2022-11-02

## 2022-11-02 ENCOUNTER — APPOINTMENT (INPATIENT)
Dept: RADIOLOGY | Facility: HOSPITAL | Age: 57
End: 2022-11-02

## 2022-11-02 LAB
ALBUMIN SERPL BCP-MCNC: 3.6 G/DL (ref 3.5–5)
ALP SERPL-CCNC: 71 U/L (ref 46–116)
ALT SERPL W P-5'-P-CCNC: 49 U/L (ref 12–78)
ANION GAP SERPL CALCULATED.3IONS-SCNC: 8 MMOL/L (ref 4–13)
AORTIC ROOT: 3.4 CM
APICAL FOUR CHAMBER EJECTION FRACTION: 56 %
ASCENDING AORTA: 3.5 CM
AST SERPL W P-5'-P-CCNC: 29 U/L (ref 5–45)
B BURGDOR IGG+IGM SER-ACNC: 0.2 AI
BILIRUB SERPL-MCNC: 0.22 MG/DL (ref 0.2–1)
BUN SERPL-MCNC: 14 MG/DL (ref 5–25)
CALCIUM SERPL-MCNC: 8.8 MG/DL (ref 8.3–10.1)
CHLORIDE SERPL-SCNC: 107 MMOL/L (ref 96–108)
CHOLEST SERPL-MCNC: 206 MG/DL
CO2 SERPL-SCNC: 25 MMOL/L (ref 21–32)
CREAT SERPL-MCNC: 1.15 MG/DL (ref 0.6–1.3)
E WAVE DECELERATION TIME: 124 MS
ERYTHROCYTE [DISTWIDTH] IN BLOOD BY AUTOMATED COUNT: 13.4 % (ref 11.6–15.1)
EST. AVERAGE GLUCOSE BLD GHB EST-MCNC: 123 MG/DL
FOLATE SERPL-MCNC: 11.1 NG/ML (ref 3.1–17.5)
FRACTIONAL SHORTENING: 28 % (ref 28–44)
GFR SERPL CREATININE-BSD FRML MDRD: 70 ML/MIN/1.73SQ M
GLUCOSE SERPL-MCNC: 101 MG/DL (ref 65–140)
HBA1C MFR BLD: 5.9 %
HCT VFR BLD AUTO: 42.5 % (ref 36.5–49.3)
HCYS SERPL-SCNC: 14.4 UMOL/L (ref 5.3–14.2)
HDLC SERPL-MCNC: 43 MG/DL
HGB BLD-MCNC: 14.2 G/DL (ref 12–17)
INTERVENTRICULAR SEPTUM IN DIASTOLE (PARASTERNAL SHORT AXIS VIEW): 1 CM
INTERVENTRICULAR SEPTUM: 1 CM (ref 0.6–1.1)
LAAS-AP2: 18.5 CM2
LAAS-AP4: 16.2 CM2
LDLC SERPL CALC-MCNC: 150 MG/DL (ref 0–100)
LEFT ATRIUM AREA SYSTOLE SINGLE PLANE A4C: 18 CM2
LEFT ATRIUM SIZE: 3.2 CM
LEFT INTERNAL DIMENSION IN SYSTOLE: 3.6 CM (ref 2.1–4)
LEFT VENTRICULAR INTERNAL DIMENSION IN DIASTOLE: 5 CM (ref 3.5–6)
LEFT VENTRICULAR POSTERIOR WALL IN END DIASTOLE: 1 CM
LEFT VENTRICULAR STROKE VOLUME: 65 ML
LVSV (TEICH): 65 ML
MCH RBC QN AUTO: 31.2 PG (ref 26.8–34.3)
MCHC RBC AUTO-ENTMCNC: 33.4 G/DL (ref 31.4–37.4)
MCV RBC AUTO: 93 FL (ref 82–98)
MV E'TISSUE VEL-LAT: 10 CM/S
MV E'TISSUE VEL-SEP: 7 CM/S
MV PEAK A VEL: 0.59 M/S
MV PEAK E VEL: 52 CM/S
MV STENOSIS PRESSURE HALF TIME: 36 MS
MV VALVE AREA P 1/2 METHOD: 6.11 CM2
PLATELET # BLD AUTO: 194 THOUSANDS/UL (ref 149–390)
PMV BLD AUTO: 10.4 FL (ref 8.9–12.7)
POTASSIUM SERPL-SCNC: 4 MMOL/L (ref 3.5–5.3)
PROT SERPL-MCNC: 7.4 G/DL (ref 6.4–8.4)
RBC # BLD AUTO: 4.55 MILLION/UL (ref 3.88–5.62)
RIGHT ATRIUM AREA SYSTOLE A4C: 15.1 CM2
RIGHT VENTRICLE ID DIMENSION: 4.5 CM
SARS-COV-2 RNA RESP QL NAA+PROBE: NEGATIVE
SL CV LEFT ATRIUM LENGTH A2C: 4.6 CM
SL CV LV EF: 55
SL CV PED ECHO LEFT VENTRICLE DIASTOLIC VOLUME (MOD BIPLANE) 2D: 118 ML
SL CV PED ECHO LEFT VENTRICLE SYSTOLIC VOLUME (MOD BIPLANE) 2D: 53 ML
SODIUM SERPL-SCNC: 140 MMOL/L (ref 135–147)
T4 FREE SERPL-MCNC: 0.75 NG/DL (ref 0.76–1.46)
TR MAX PG: 17 MMHG
TR PEAK VELOCITY: 2.1 M/S
TRICUSPID VALVE PEAK REGURGITATION VELOCITY: 2.07 M/S
TRIGL SERPL-MCNC: 64 MG/DL
TSH SERPL DL<=0.05 MIU/L-ACNC: 5.37 UIU/ML (ref 0.45–4.5)
VIT B12 SERPL-MCNC: 229 PG/ML (ref 100–900)
WBC # BLD AUTO: 4.89 THOUSAND/UL (ref 4.31–10.16)

## 2022-11-02 RX ORDER — TADALAFIL 5 MG/1
5 TABLET ORAL DAILY PRN
Qty: 30 TABLET | Refills: 0 | Status: SHIPPED | OUTPATIENT
Start: 2022-11-02 | End: 2022-11-10

## 2022-11-02 RX ADMIN — ASPIRIN 81 MG CHEWABLE TABLET 81 MG: 81 TABLET CHEWABLE at 08:41

## 2022-11-02 RX ADMIN — ATORVASTATIN CALCIUM 40 MG: 40 TABLET, FILM COATED ORAL at 19:03

## 2022-11-02 RX ADMIN — GADOBUTROL 9 ML: 604.72 INJECTION INTRAVENOUS at 22:00

## 2022-11-02 RX ADMIN — ENOXAPARIN SODIUM 40 MG: 40 INJECTION SUBCUTANEOUS at 08:42

## 2022-11-02 NOTE — ASSESSMENT & PLAN NOTE
· Patient presented with left-sided facial paraesthesia  · Reported history of mini stroke on aspirin 81 mg daily  · Admitted under stroke pathway  · CT head showing no acute intracranial abnormality  · CTA showing no significant stenosis , aneurysm or dissection  · Neurology following  · Continue telemetry  · Continue aspirin 81 mg daily  · Chest x-ray showing no acute cardiopulmonary disease  · echo pending  · MRI brain pending  · Continue on statin 40 mg  · Folate within normal range, B12 within normal range  · A1c 5 9  · Lipid panel 413-40-  · TSH 5 3, T4 pending    Lab Results   Component Value Date    HSTNI0 4 11/01/2022    HSTNI2 3 11/01/2022    HSTNI4 4 11/01/2022

## 2022-11-02 NOTE — UTILIZATION REVIEW
Initial Clinical Review    Admission: Date/Time/Statement:   Admission Orders (From admission, onward)     Ordered        11/01/22 1650  INPATIENT ADMISSION  Once                      Orders Placed This Encounter   Procedures   • INPATIENT ADMISSION     Standing Status:   Standing     Number of Occurrences:   1     Order Specific Question:   Level of Care     Answer:   Med Surg [16]     Order Specific Question:   Estimated length of stay     Answer:   More than 2 Midnights     Order Specific Question:   Certification     Answer:   I certify that inpatient services are medically necessary for this patient for a duration of greater than two midnights  See H&P and MD Progress Notes for additional information about the patient's course of treatment  ED Arrival Information     Expected   -    Arrival   11/1/2022 14:38    Acuity   Emergent            Means of arrival   Walk-In    Escorted by   Self    Service   Hospitalist    Admission type   Emergency            Arrival complaint   Arm Pain,Facial Pain           Chief Complaint   Patient presents with   • Facial Numbness     Pt reports left sided head, face and leg pain, pt reports this morning his lips feel funny and at times deviate to the right - Pt reports started last week but got worse this morning  Pt reports Pain to left upper arm as well hx of stroke in past pt reports this feels similar to previous stroke       Initial Presentation: 65 y o  male who presented self from home to [address]  ED  Inpatient admission for evaluation and treatment of stroke-like symptoms  PMHx: HLD, TIA, sleep apnea, COVID-19 pneumonia (1/22)  Presented w/ L-sided facial paresthesias  On exam, unremarkable  CT/CTA unremarkable  Plan: continue ASA, start statin, follow-up lipid panel  Neurology consulted  Neurology Consult: Pt w/ NIHSS 1 for some sensory loss on L side  Plan: MRI brain, echo, statin/ASA, permissive HTN, telemetry, frequent neuro checks        Date: 11/02/22   Day 2: Denies facial droop, numbness, or tingling  Exam unremarkable  Telemetry shows NSR  Plan: telemetry, neuro checks, statin/ASA  ED Triage Vitals   Temperature Pulse Respirations Blood Pressure SpO2   11/01/22 1445 11/01/22 1445 11/01/22 1445 11/01/22 1445 11/01/22 1445   98 4 °F (36 9 °C) 81 16 (!) 148/112 98 %      Temp Source Heart Rate Source Patient Position - Orthostatic VS BP Location FiO2 (%)   11/01/22 1445 11/01/22 1445 11/01/22 1445 11/01/22 1445 --   Oral Monitor Sitting Right arm       Pain Score       11/01/22 1737       No Pain          Wt Readings from Last 1 Encounters:   11/01/22 93 3 kg (205 lb 11 oz)     Additional Vital Signs:   Date/Time Temp Pulse Resp BP MAP (mmHg) SpO2 O2 Device   11/02/22 0807 97 6 °F (36 4 °C) 62 18 122/68 89 97 % None (Room air)   11/02/22 0445 97 4 °F (36 3 °C) Abnormal  55 18 126/68 90 96 % None (Room air)   11/02/22 0245 97 9 °F (36 6 °C) 56 18 134/63 83 93 % None (Room air)   11/02/22 0045 96 2 °F (35 7 °C) Abnormal  55 20 140/68 96 95 % None (Room air)   11/01/22 2330 -- -- -- -- -- -- None (Room air)   11/01/22 2245 97 7 °F (36 5 °C) 57 18 125/67 91 96 % --   11/01/22 2045 98 °F (36 7 °C) 62 18 144/80 107 97 % None (Room air)   11/01/22 1945 97 9 °F (36 6 °C) 64 18 136/65 88 97 % None (Room air)   11/01/22 1845 -- 60 18 131/70 -- 97 % None (Room air)   11/01/22 1745 97 4 °F (36 3 °C) Abnormal  68 18 134/99 -- 97 % None (Room air)   11/01/22 1627 -- 65 18 139/72 -- 95 % None (Room air)   11/01/22 15:32:15 -- 80 18 144/80 -- 98 % --       Pertinent Labs/Diagnostic Test Results:   11/2 Echo  •  Left Ventricle: Left ventricular cavity size is normal  Wall thickness is normal  The left ventricular ejection fraction is 55%   Systolic function is normal  Wall motion is normal  Diastolic function is normal   •  Right Ventricle: Right ventricular cavity size is normal  Systolic function is normal     MRI brain IAC wo and w contrast   Final Result by Nelsy Le MD Gómez (11/03 0818)      1  No acute ischemia  2   Asymmetric enhancement of the left VII nerve involving labyrinthine segment, geniculate ganglion, and to lesser degree tympanic segment  Finding most likely represents inflammatory or infectious etiology (Bell's palsy)  Given minimal nodularity    facial nerve schwannoma is within differential consideration, but is thought to be less likely  May consider follow MRI if clinically indicated  Workstation performed: [id number]         XR chest portable   Final Result by Jerilyn Phan MD (11/02 5416)      No acute cardiopulmonary disease  Workstation performed: [id number]         CTA stroke alert (head/neck)   Final Result by Matt Ordoñez MD (11/01 6221)      No evidence of hemodynamic significant stenosis, aneurysm or dissection  I personally discussed this study with Dr Cassandra Leon on 11/1/2022 at 3:36 PM                         Workstation performed: [id number]         CT stroke alert brain   Final Result by Matt Ordoñez MD (11/01 1537)      No acute intracranial abnormality               I personally discussed this study with Dr Cassandra Leon on 11/1/2022 at 3:36 PM                 Workstation performed: [id number]               Results from last 7 days   Lab Units 11/02/22  0451 11/01/22  1511   WBC Thousand/uL 4 89 6 10   HEMOGLOBIN g/dL 14 2 14 4   HEMATOCRIT % 42 5 43 4   PLATELETS Thousands/uL 194 221         Results from last 7 days   Lab Units 11/02/22  0451 11/01/22  1511   SODIUM mmol/L 140 143   POTASSIUM mmol/L 4 0 3 9   CHLORIDE mmol/L 107 107   CO2 mmol/L 25 30   ANION GAP mmol/L 8 6   BUN mg/dL 14 15   CREATININE mg/dL 1 15 1 12   EGFR ml/min/1 73sq m 70 73   CALCIUM mg/dL 8 8 9 0     Results from last 7 days   Lab Units 11/02/22  0451   AST U/L 29   ALT U/L 49   ALK PHOS U/L 71   TOTAL PROTEIN g/dL 7 4   ALBUMIN g/dL 3 6   TOTAL BILIRUBIN mg/dL 0 22     Results from last 7 days   Lab Units 11/01/22  1510   POC GLUCOSE mg/dl 135     Results from last 7 days   Lab Units 11/02/22  0451 11/01/22  1511   GLUCOSE RANDOM mg/dL 101 115     Results from last 7 days   Lab Units 11/01/22 2007 11/01/22  1713 11/01/22  1511   HS TNI 0HR ng/L  --   --  4   HS TNI 2HR ng/L  --  3  --    HSTNI D2 ng/L  --  -1  --    HS TNI 4HR ng/L 4  --   --    HSTNI D4 ng/L 0  --   --          Results from last 7 days   Lab Units 11/01/22  1511   PROTIME seconds 13 4   INR  1 02   PTT seconds 25     Results from last 7 days   Lab Units 11/02/22  0451   TSH 3RD GENERATON uIU/mL 5 367*     Results from last 7 days   Lab Units 11/01/22  1511   CRP mg/L <3 0         ED Treatment:   Medication Administration from 11/01/2022 1438 to 11/01/2022 1734       Date/Time Order Dose Route Action Comments     11/01/2022 1529 iohexol (OMNIPAQUE) 350 MG/ML injection (SINGLE-DOSE) 100 mL 100 mL Intravenous Given      11/01/2022 1605 aspirin tablet 325 mg 325 mg Oral Given         Past Medical History:   Diagnosis Date   • Hyperlipidemia    • Pneumonia due to COVID-19 virus 01/13/2022     Present on Admission:  • Stroke-like symptoms  • Hyperlipidemia  • Obesity (BMI 30-39  9)  • RBBB      Admitting Diagnosis: Facial weakness [R29 810]  Facial numbness [R20 0]  Age/Sex: 65 y o  male  Admission Orders:  Regular Diet  Telemetry  Incentive Spirometry  Baseline NIH stroke scale on admission, reassess Q24H x 2 D  Nursing dysphagia assessment prior to staring diet  Neuro checks, q1h x4h, q2h x8h, q4h x72h        Scheduled Medications:  aspirin, 81 mg, Oral, Daily  atorvastatin, 40 mg, Oral, QPM  enoxaparin, 40 mg, Subcutaneous, Q24H [facility]    Continuous IV Infusions: none    PRN Meds: none      IP CONSULT TO NEUROLOGY  IP CONSULT TO CASE MANAGEMENT  IP CONSULT TO CASE MANAGEMENT  IP CONSULT TO NUTRITION SERVICES    Network Utilization Review Department  ATTENTION: Please call with any questions or concerns to [phone] and carefully listen to the prompts so that you are directed to the right person  All voicemails are confidential   Luz Marina Gonzalez all requests for admission clinical reviews, approved or denied determinations and any other requests to dedicated fax number below belonging to the campus where the patient is receiving treatment   List of dedicated fax numbers for the Facilities:  [address] DENIALS (Administrative/Medical Necessity) [phone]   [address] (Maternity/NICU/Pediatrics) [phone]   [address] Mer[address] Schuster [phone]   [facility]  [phone]   [address] [place] [address] Jhon Terrell [place] [address] [phone]   [facility] [facility] 134 815 [facility] [phone]

## 2022-11-02 NOTE — SPEECH THERAPY NOTE
Order received as part of stroke pathway, and chart reviewed  Discussed with RN  Pt passed nursing dysphagia screen, and is reportedly tolerating a regular diet and thin liquids  Briefly conversed with pt  He denies any difficulty with speech or swallow, and no difficulty was noted during conversation  Observed pt take medication whole with water, without any overt s/s of aspiration  Pt c/o mild facial and L labial numbness/tingling, but does not c/o any difficulty with mastication or pocketing  There does appear to be slight L side facial/labial weakness, but is not impacting speech or swallow  At this time, full speech/swallow assessment is not indicated  If status changes please send a new order  Screen only

## 2022-11-02 NOTE — H&P
[facility]  H&P- Jose Roberto Nowak 1965, 65 y o  male MRN: [id number]  Unit/Bed#: E4 -01 Encounter: [id number]  Primary Care Provider: UBALDO Woods   Date and time admitted to hospital: 11/1/2022  2:47 PM    Assessment and Plan  * Stroke-like symptoms  Assessment & Plan  · Reported history of mini stroke on aspirin 81 mg daily  · Stroke alert in ED:  Left-sided facial paresthesias with lip pulling toward right  · Seen by neurology in ED:  Follow-up on MRI with/without contrast  · Symptoms resolving  · Patient denies any chest pain or symptoms below his face    Lab Results   Component Value Date    HSTNI0 4 11/01/2022    HSTNI2 3 11/01/2022       RBBB  Assessment & Plan  · RBBB dating back to 2008 upon review of ECG    Obesity (BMI 30-39  9)  Assessment & Plan  · Body mass index is 31 28 kg/m²  Hyperlipidemia  Assessment & Plan  · Start atorvastatin and follow up on a m lipid panel      VTE Prophylaxis: Enoxaparin (Lovenox)  Code Status: Level 1 - Full Code  Anticipated Length of Stay:  Patient will be admitted on an Inpatient basis with an anticipated length of stay of  greater than 2 midnights  Justification for Hospital Stay: Stroke-like symptoms  Total Time for Visit, including Counseling / Coordination of Care: xx mins  Greater than 50% of this total time spent on direct patient counseling and coordination of care  Chief Complaint:     Facial Numbness (Pt reports left sided head, face and leg pain, pt reports this morning his lips feel funny and at times deviate to the right - Pt reports started last week but got worse this morning  Pt reports Pain to left upper arm as well hx of stroke in past pt reports this feels similar to previous stroke)    History of Present Illness:    Jose Roberto Nowak is a 65 y o  male with a past medical history of TIA and sleep apnea who presents with left-sided facial paresthesias    The patient woke up today where he noted left-sided face was numb and his lips were being pulled towards the right  He went grocery shopping but due to persistence of symptoms he came to the emergency department where he was a stroke alert and seen by neurology  Initial CT/CTA negative  He has been admitted for stroke workup  He denies any chest pain shortness a breath nausea vomiting or diarrhea  His facial symptoms have much improved since arrival     Review of Systems:  Review of Systems   Constitutional: Negative for chills, diaphoresis and fever  HENT: Negative for facial swelling  Eyes: Negative for visual disturbance  Respiratory: Negative for shortness of breath  Cardiovascular: Negative for chest pain and palpitations  Gastrointestinal: Negative for abdominal distention, abdominal pain, diarrhea, nausea and vomiting  Genitourinary: Negative for hematuria and urgency  Musculoskeletal: Negative for back pain and myalgias  Skin: Negative for rash  Neurological: Positive for facial asymmetry and numbness  Negative for dizziness, seizures, speech difficulty and headaches  Psychiatric/Behavioral: The patient is not nervous/anxious  All other systems reviewed and are negative  Past Medical and Surgical History:   Past Medical History:   Diagnosis Date   • Hyperlipidemia    • Pneumonia due to COVID-19 virus 01/13/2022     History reviewed  No pertinent surgical history  Meds/Allergies: Allergies: No Known Allergies  Prior to Admission Medications   Prescriptions Last Dose Informant Patient Reported? Taking?    Diclofenac Sodium (VOLTAREN) 1 % 10/31/2022 at Unknown time  No Yes   Sig: Apply 2 g topically 4 (four) times a day as needed (knee pain)   aspirin (ECOTRIN LOW STRENGTH) 81 mg EC tablet   Yes Yes   Sig: Take 81 mg by mouth daily   tadalafil (CIALIS) 5 MG tablet   No No   Sig: Take 1 tablet (5 mg total) by mouth daily as needed for erectile dysfunction      Facility-Administered Medications: None Social History:     Social History     Socioeconomic History   • Marital status: /Civil Union     Spouse name: Not on file   • Number of children: Not on file   • Years of education: Not on file   • Highest education level: Not on file   Occupational History   • Not on file   Tobacco Use   • Smoking status: Former Smoker     Types: Cigarettes   • Smokeless tobacco: Never Used   Vaping Use   • Vaping Use: Never used   Substance and Sexual Activity   • Alcohol use: Never     Alcohol/week: 1 0 standard drink     Types: 1 Cans of beer per week   • Drug use: No   • Sexual activity: Not on file   Other Topics Concern   • Not on file   Social History Narrative   • Not on file     Social Determinants of Health     Financial Resource Strain: Low Risk    • Difficulty of Paying Living Expenses: Not hard at all   Food Insecurity: No Food Insecurity   • Worried About Running Out of Food in the Last Year: Never true   • Ran Out of Food in the Last Year: Never true   Transportation Needs: No Transportation Needs   • Lack of Transportation (Medical): No   • Lack of Transportation (Non-Medical): No   Physical Activity: Not on file   Stress: Not on file   Social Connections: Not on file   Intimate Partner Violence: Not on file   Housing Stability: Not on file     Patient Pre-hospital Living Situation:   Patient Pre-hospital Level of Mobility:   Patient Pre-hospital Diet Restrictions:     Family History:  History reviewed  No pertinent family history  Physical Exam:   Vitals:   Blood Pressure: 136/65 (11/01/22 1945)  Pulse: 64 (11/01/22 1945)  Temperature: 97 9 °F (36 6 °C) (11/01/22 1945)  Temp Source: Temporal (11/01/22 1945)  Respirations: 18 (11/01/22 1945)  Height: 5' 7 99" (172 7 cm) (11/01/22 1845)  Weight - Scale: 93 3 kg (205 lb 11 oz) (11/01/22 1845)  SpO2: 97 % (11/01/22 1945)    Physical Exam  Vitals reviewed  Constitutional:       General: He is not in acute distress  Appearance: Normal appearance  HENT:      Head: Atraumatic  Eyes:      Extraocular Movements: Extraocular movements intact  Pupils: Pupils are equal, round, and reactive to light  Cardiovascular:      Rate and Rhythm: Regular rhythm  Heart sounds: Normal heart sounds  Pulmonary:      Effort: Pulmonary effort is normal       Breath sounds: No wheezing  Abdominal:      General: Bowel sounds are normal       Palpations: Abdomen is soft  Tenderness: There is no abdominal tenderness  There is no rebound  Musculoskeletal:         General: No swelling or tenderness  Cervical back: Normal range of motion  Skin:     General: Skin is warm and dry  Neurological:      Mental Status: He is alert and oriented to person, place, and time  Cranial Nerves: No cranial nerve deficit  Psychiatric:         Mood and Affect: Mood normal        Lab Results: I have personally reviewed pertinent reports  Results from last 7 days   Lab Units 11/01/22  1511   WBC Thousand/uL 6 10   HEMOGLOBIN g/dL 14 4   HEMATOCRIT % 43 4   PLATELETS Thousands/uL 221     Results from last 7 days   Lab Units 11/01/22  1511   SODIUM mmol/L 143   POTASSIUM mmol/L 3 9   CHLORIDE mmol/L 107   CO2 mmol/L 30   ANION GAP mmol/L 6   BUN mg/dL 15   CREATININE mg/dL 1 12   CALCIUM mg/dL 9 0   EGFR ml/min/1 73sq m 73   GLUCOSE RANDOM mg/dL 115     Results from last 7 days   Lab Units 11/01/22  1511   INR  1 02         Results from last 7 days   Lab Units 11/01/22  1713 11/01/22  1511   HS TNI 0HR ng/L  --  4   HS TNI 2HR ng/L 3  --        Imaging: I have personally reviewed pertinent films in PACS  CT stroke alert brain    Result Date: 11/1/2022  Impression: No acute intracranial abnormality  I personally discussed this study with Dr Den Quevedo on 11/1/2022 at 3:36 PM   Workstation performed: [id number]     CTA stroke alert (head/neck)    Result Date: 11/1/2022  Impression: No evidence of hemodynamic significant stenosis, aneurysm or dissection   I personally discussed this study with Dr Tommy Barrett on 11/1/2022 at 3:36 PM  Workstation performed: [id number]       EKG, Pathology, and Other Studies Reviewed on Admission:   EKG  Result Date: 11/01/22  Personally reviewed strips with impression of:  Right bundle branch block 68 bpm    Allscripts/ Epic Records Reviewed: Yes    ** Please Note: This note has been constructed using a voice recognition system   **

## 2022-11-02 NOTE — OCCUPATIONAL THERAPY NOTE
Occupational Therapy Screen        Patient Name: David Motley  WPQRD'K Date: 11/2/2022 11/02/22 0823   OT Last Visit   OT Visit Date 11/02/22   Note Type   Note type Screen   Additional Comments OT consult received  Per pt reports CVA symptoms have resolved and pt was independent w/ ADLs in bathroom and independent w/ functional transfers and mobility  Pt receptive to [employer] education  Pt w/ no concerns for d/c  Will d/c OT       Documentation completed by: Radames Zuñiga MS, OTR/L

## 2022-11-02 NOTE — PHYSICAL THERAPY NOTE
PHYSICAL THERAPY SCREEN          Patient Name: Ada Gomez  FNIFH'R Date: 11/2/2022 11/02/22 2350   PT Last Visit   PT Visit Date 11/02/22   Note Type   Note type Screen   Additional Comments Per discussion w OT all admitting sx have resolved and pt does not demonstrate need for skilled IPPT evaluation at this time  Will be available for re-consult as medically appropriate         Nayan Kaur, PT

## 2022-11-02 NOTE — PROGRESS NOTES
[address] [facility]  Progress Note - Bronwyn Martinez 1965, 65 y o  male MRN: [id number]  Unit/Bed#: E4 -01 Encounter: [id number]  Primary Care Provider: UBALDO Goldsmith   Date and time admitted to hospital: 11/1/2022  2:47 PM    * Stroke-like symptoms  Assessment & Plan  · Patient presented with left-sided facial paraesthesia  · Reported history of mini stroke on aspirin 81 mg daily  · Admitted under stroke pathway  · CT head showing no acute intracranial abnormality  · CTA showing no significant stenosis , aneurysm or dissection  · Neurology following  · Continue telemetry  · Continue aspirin 81 mg daily  · Chest x-ray showing no acute cardiopulmonary disease  · echo pending  · MRI brain pending  · Continue on statin 40 mg  · Folate within normal range, B12 within normal range  · A1c 5 9  · Lipid panel [id number]  · TSH 5 3, T4 pending    Lab Results   Component Value Date    HSTNI0 4 11/01/2022    HSTNI2 3 11/01/2022    HSTNI4 4 11/01/2022       RBBB  Assessment & Plan  · RBBB dating back to 2008 upon review of ECG    Hyperlipidemia  Assessment & Plan  · Continue atorvastatin 40 mg  · Lipid panel [id number]-    Obesity (BMI 30-39  9)  Assessment & Plan  · Body mass index is 31 18 kg/m²  VTE Pharmacologic Prophylaxis: VTE Score: 1 Moderate Risk (Score 3-4) - Pharmacological DVT Prophylaxis Ordered: enoxaparin (Lovenox)  Patient Centered Rounds: I performed bedside rounds with nursing staff today  Discussions with Specialists or Other Care Team Provider:  None    Education and Discussions with Family / Patient: Attempted to update  (wife) via phone  Left voicemail  Time Spent for Care: 30 minutes  More than 50% of total time spent on counseling and coordination of care as described above      Current Length of Stay: 1 day(s)  Current Patient Status: Inpatient   Certification Statement: The patient will continue to require additional inpatient hospital stay due to Acute stroke workup  Discharge Plan: Anticipate discharge in 48 hrs to home  Code Status: Level 1 - Full Code    Subjective:   Patient was seen lying in bed today  He denies any current facial droop, numbness or tingling  He denies any one-sided weakness, change in speech, change in vision  He reports feeling well today  Denies any chest pain  He reports he has shortness of breath on exertion and that this has been noted for some time  He reports having workup for this in past unclear what was done  Objective:     Vitals:   Temp (24hrs), Av 6 °F (36 4 °C), Min:96 2 °F (35 7 °C), Max:98 4 °F (36 9 °C)    Temp:  [96 2 °F (35 7 °C)-98 4 °F (36 9 °C)] 97 2 °F (36 2 °C)  HR:  [55-81] 60  Resp:  [16-20] 16  BP: (122-148)/() 129/67  SpO2:  [93 %-98 %] 97 %  Body mass index is 31 18 kg/m²  Input and Output Summary (last 24 hours):   No intake or output data in the 24 hours ending 22 1302    Physical Exam:   Physical Exam  Vitals and nursing note reviewed  Constitutional:       Appearance: Normal appearance  HENT:      Head: Normocephalic and atraumatic  Eyes:      General: No scleral icterus  Cardiovascular:      Rate and Rhythm: Normal rate and regular rhythm  Pulmonary:      Effort: Pulmonary effort is normal       Breath sounds: No wheezing  Abdominal:      General: Abdomen is flat  Bowel sounds are normal       Palpations: Abdomen is soft  Tenderness: There is no abdominal tenderness  Musculoskeletal:      Right lower leg: No edema  Left lower leg: No edema  Skin:     General: Skin is warm and dry  Neurological:      Mental Status: He is alert and oriented to person, place, and time  Comments: Sensation intact to light touch bilaterally  Strength 5/5 bilateral upper and lower extremities  Patient intact to finger-to-nose    Facial droop no longer present during exam   Tongue midline   Psychiatric:         Mood and Affect: Mood normal          Behavior: Behavior normal           Additional Data:     Labs:  Results from last 7 days   Lab Units 11/02/22  0451   WBC Thousand/uL 4 89   HEMOGLOBIN g/dL 14 2   HEMATOCRIT % 42 5   PLATELETS Thousands/uL 194     Results from last 7 days   Lab Units 11/02/22  0451   SODIUM mmol/L 140   POTASSIUM mmol/L 4 0   CHLORIDE mmol/L 107   CO2 mmol/L 25   BUN mg/dL 14   CREATININE mg/dL 1 15   ANION GAP mmol/L 8   CALCIUM mg/dL 8 8   ALBUMIN g/dL 3 6   TOTAL BILIRUBIN mg/dL 0 22   ALK PHOS U/L 71   ALT U/L 49   AST U/L 29   GLUCOSE RANDOM mg/dL 101     Results from last 7 days   Lab Units 11/01/22  1511   INR  1 02     Results from last 7 days   Lab Units 11/01/22  1510   POC GLUCOSE mg/dl 135     Results from last 7 days   Lab Units 11/02/22  0451   HEMOGLOBIN A1C % 5 9*           Lines/Drains:  Invasive Devices  Report    Peripheral Intravenous Line  Duration           Peripheral IV 11/01/22 Right Antecubital <1 day                  Telemetry:  Telemetry Orders (From admission, onward)             24 Hour Telemetry Monitoring  Continuous x 24 Hours (Telem)        References:    Telemetry Guidelines   Question:  Reason for 24 Hour Telemetry  Answer:  Acute CVA (>24 hrs old)                 Telemetry Reviewed: Normal Sinus Rhythm  Indication for Continued Telemetry Use: Acute CVA             Imaging: Reviewed radiology reports from this admission including: chest xray    Recent Cultures (last 7 days):         Last 24 Hours Medication List:   Current Facility-Administered Medications   Medication Dose Route Frequency Provider Last Rate   • aspirin  81 mg Oral Daily Daniel Thomas DO     • atorvastatin  40 mg Oral QPM Daniel Thomas DO     • enoxaparin  40 mg Subcutaneous Q24H [facility] Paddy Luna DO          Today, Patient Was Seen By: Stefanie Bello    **Please Note: This note may have been constructed using a voice recognition system  **

## 2022-11-03 VITALS
BODY MASS INDEX: 31.07 KG/M2 | TEMPERATURE: 98 F | DIASTOLIC BLOOD PRESSURE: 76 MMHG | RESPIRATION RATE: 18 BRPM | WEIGHT: 205 LBS | SYSTOLIC BLOOD PRESSURE: 138 MMHG | OXYGEN SATURATION: 97 % | HEART RATE: 70 BPM | HEIGHT: 68 IN

## 2022-11-03 PROBLEM — I10 HYPERTENSION: Status: ACTIVE | Noted: 2022-11-03

## 2022-11-03 PROBLEM — R29.810 FACIAL WEAKNESS: Status: ACTIVE | Noted: 2022-11-01

## 2022-11-03 PROBLEM — R79.89 ELEVATED TSH: Status: ACTIVE | Noted: 2022-11-03

## 2022-11-03 PROBLEM — G51.0 BELL'S PALSY: Status: ACTIVE | Noted: 2022-11-01

## 2022-11-03 LAB
AMPHETAMINES UR QL SCN: NEGATIVE NG/ML
ANION GAP SERPL CALCULATED.3IONS-SCNC: 8 MMOL/L (ref 4–13)
BARBITURATES UR QL SCN: NEGATIVE NG/ML
BENZODIAZ UR QL: NEGATIVE NG/ML
BUN SERPL-MCNC: 20 MG/DL (ref 5–25)
BZE UR QL: NEGATIVE NG/ML
CALCIUM SERPL-MCNC: 8.8 MG/DL (ref 8.3–10.1)
CANNABINOIDS UR QL SCN: NEGATIVE NG/ML
CHLORIDE SERPL-SCNC: 106 MMOL/L (ref 96–108)
CO2 SERPL-SCNC: 27 MMOL/L (ref 21–32)
CREAT SERPL-MCNC: 1.2 MG/DL (ref 0.6–1.3)
GFR SERPL CREATININE-BSD FRML MDRD: 67 ML/MIN/1.73SQ M
GLUCOSE SERPL-MCNC: 123 MG/DL (ref 65–140)
METHADONE UR QL SCN: NEGATIVE NG/ML
OPIATES UR QL: NEGATIVE NG/ML
PCP UR QL: NEGATIVE NG/ML
POTASSIUM SERPL-SCNC: 3.8 MMOL/L (ref 3.5–5.3)
PROPOXYPH UR QL SCN: NEGATIVE NG/ML
SODIUM SERPL-SCNC: 141 MMOL/L (ref 135–147)

## 2022-11-03 RX ORDER — PREDNISONE 20 MG/1
60 TABLET ORAL DAILY
Qty: 21 TABLET | Refills: 0 | Status: SHIPPED | OUTPATIENT
Start: 2022-11-03 | End: 2022-11-10

## 2022-11-03 RX ORDER — ATORVASTATIN CALCIUM 40 MG/1
40 TABLET, FILM COATED ORAL EVERY EVENING
Qty: 30 TABLET | Refills: 0 | Status: SHIPPED | OUTPATIENT
Start: 2022-11-03 | End: 2022-11-10 | Stop reason: SDUPTHER

## 2022-11-03 RX ORDER — ATORVASTATIN CALCIUM 40 MG/1
40 TABLET, FILM COATED ORAL
Status: DISCONTINUED | OUTPATIENT
Start: 2022-11-03 | End: 2022-11-03 | Stop reason: SDUPTHER

## 2022-11-03 RX ORDER — CYANOCOBALAMIN (VITAMIN B-12) 500 MCG
1000 TABLET ORAL
Qty: 10 TABLET | Refills: 0 | Status: SHIPPED | OUTPATIENT
Start: 2022-11-03 | End: 2022-12-02

## 2022-11-03 RX ORDER — AMLODIPINE BESYLATE 5 MG/1
5 TABLET ORAL DAILY
Qty: 30 TABLET | Refills: 0 | Status: SHIPPED | OUTPATIENT
Start: 2022-11-03 | End: 2022-11-10 | Stop reason: SDUPTHER

## 2022-11-03 RX ORDER — AMLODIPINE BESYLATE 5 MG/1
5 TABLET ORAL DAILY
Status: DISCONTINUED | OUTPATIENT
Start: 2022-11-03 | End: 2022-11-03 | Stop reason: HOSPADM

## 2022-11-03 RX ORDER — VALACYCLOVIR HYDROCHLORIDE 1 G/1
1000 TABLET, FILM COATED ORAL 3 TIMES DAILY
Qty: 21 TABLET | Refills: 0 | Status: SHIPPED | OUTPATIENT
Start: 2022-11-03 | End: 2022-11-10

## 2022-11-03 RX ADMIN — ASPIRIN 81 MG CHEWABLE TABLET 81 MG: 81 TABLET CHEWABLE at 08:14

## 2022-11-03 RX ADMIN — ENOXAPARIN SODIUM 40 MG: 40 INJECTION SUBCUTANEOUS at 08:14

## 2022-11-03 RX ADMIN — ATORVASTATIN CALCIUM 40 MG: 40 TABLET, FILM COATED ORAL at 15:51

## 2022-11-03 RX ADMIN — AMLODIPINE BESYLATE 5 MG: 5 TABLET ORAL at 12:36

## 2022-11-03 NOTE — DISCHARGE SUMMARY
[facility]  [facility] 1965, 65 [facility]  male MRN: [id number]  Unit/Bed#: E4 -01 Encounter: [id number]  Primary Care Provider: UBALDO Arias   Date and time admitted to hospital: 11/1/2022  2:47 PM    * Bell's palsy  Assessment & Plan  · Patient presented with left-sided facial paraesthesia  · Reported history of mini stroke on aspirin 81 mg daily  · Admitted under stroke pathway, concern for Bell's palsy as well  · CT head showing no acute intracranial abnormality  · CTA showing no significant stenosis , aneurysm or dissection  · MRI showing no acute ischemia  Asymmetric enhancement of left VII nerve  · Neurology following, cleared for discharge  · Continue aspirin 81 mg daily  · Continue on statin 40 mg  · Start patient on prednisone 60 mg x 7 days, valacyclovir 1 mg t i d  x7 days per neurology recommendations  · Folate within normal range, B12  229 supplementation at discharge follow-up with PCP  · A1c 5 9  · Lipid panel 962-86-  · TSH 5 3, T4 0 75- possibly impacted by acute viral illness repeat in 1 week follow-up with PCP    Lab Results   Component Value Date    HSTNI0 4 11/01/2022    HSTNI2 3 11/01/2022    HSTNI4 4 11/01/2022       Hypertension  Assessment & Plan  · Patient reported chest tightness and chronic dyspnea on exertion    · Chest x-ray showing no acute cardiopulmonary disease  · Echo showing ejection fraction 13% normal diastolic function, normal systolic function  · Cardiology consult  · Starting patient on 5 mg Norvasc continue at discharge follow-up with PCP    RBBB  Assessment & Plan  · RBBB dating back to 2008 upon review of ECG    Hyperlipidemia  Assessment & Plan  · Continue atorvastatin 40 mg at discharge  · Lipid panel 867-35-    Elevated TSH  Assessment & Plan  · TSH 5 367, T4 0 75  · Repeat TSH outpatient, thyroid levels possibly impacted by viral infection  · Patient denies symptoms at this time  ·     Obesity (BMI 30-39  9)  Assessment & Plan  · Body mass index is 31 18 kg/m²  Medical Problems             Resolved Problems  Date Reviewed: 11/3/2022   None               Discharging Physician / Practitioner: Madi Bond  PCP: Rex Hooper  Admission Date:   Admission Orders (From admission, onward)     Ordered        11/01/22 1650  INPATIENT ADMISSION  Once                      Discharge Date: 11/03/22    Consultations During Hospital Stay:  · Cardiology Neurology    Procedures Performed:   · None    Significant Findings / Test Results:   · Brain showing no acute intracranial abnormality  · CTA head and neck showing no evidence of stenosis aneurysm or dissection  · Chest x-ray showing no acute cardiopulmonary disease  · MRI brain showing no acute ischemia, asymmetric enhancement of the left VII nerve    Incidental Findings:   · None    Test Results Pending at Discharge (will require follow up): · None     Outpatient Tests Requested:  · TSH    Complications:  None    Reason for Admission:  Left-sided facial droop    Hospital Course:   Fernanda Ruiz is a 65 y o  male patient who originally presented to the hospital on 11/1/2022 due to left-sided facial droop, and left-sided facial numbness  He also reported chest tightness and dyspnea on exertion  Patient was admitted under stroke pathway  CT head showed no acute intracranial abnormality  Neurology was consulted recommending MRI brain  Cardiology was consulted due to patient's chest tightness and chronic dyspnea on exertion  Echo showed ejection fraction 93%, normal diastolic function normal systolic function  Cardiology recommended starting patient on 5 mg Norvasc for blood pressure control, continue on statin,  no need for outpatient cardiac testing  Patient to follow-up with PCP  MRI brain showed enhancement of the left VII nerve likely indicating Bell's palsy    Patient was started on prednisone 60 mg x 7 days, valacyclovir 1 g t i d  x7 days  Patient is to follow up with PCP for repeat TSH as was found elevated during admission  This could be impacted by acute viral illness  Please see above list of diagnoses and related plan for additional information  Condition at Discharge: stable    Discharge Day Visit / Exam:   Subjective:  Patient was seen sitting in chair at bedside today  He reports feeling well today  He does eyes any numbness, tingling, weakness, facial drooping, Chest pain, shortness a breath  He stated that he understands medication changes  He is anxious to get home   was used during our interview  Vitals: Blood Pressure: 140/88 (11/03/22 1100)  Pulse: 60 (11/03/22 1100)  Temperature: 97 5 °F (36 4 °C) (11/03/22 0837)  Temp Source: Temporal (11/03/22 0837)  Respirations: 18 (11/03/22 1100)  Height: 5' 7 99" (172 7 cm) (11/02/22 1109)  Weight - Scale: 93 kg (205 lb) (11/02/22 1109)  SpO2: 96 % (11/03/22 0837)  Exam:   Physical Exam  Vitals and nursing note reviewed  Constitutional:       Appearance: Normal appearance  HENT:      Head: Normocephalic and atraumatic  Eyes:      General: No scleral icterus  Cardiovascular:      Rate and Rhythm: Normal rate and regular rhythm  Pulmonary:      Effort: Pulmonary effort is normal       Breath sounds: Normal breath sounds  No wheezing  Abdominal:      General: Abdomen is flat  Bowel sounds are normal       Palpations: Abdomen is soft  Tenderness: There is no abdominal tenderness  Musculoskeletal:      Right lower leg: No edema  Left lower leg: No edema  Skin:     General: Skin is warm and dry  Neurological:      Mental Status: He is alert and oriented to person, place, and time  Mental status is at baseline  Comments: Sensation intact to light touch  Strength 5/5 bilateral upper and lower extremities     Psychiatric:         Mood and Affect: Mood normal          Behavior: Behavior normal  Discussion with Family: Attempted to update  (wife) via phone  Left voicemail  Discharge instructions/Information to patient and family:   See after visit summary for information provided to patient and family  Provisions for Follow-Up Care:  See after visit summary for information related to follow-up care and any pertinent home health orders  Disposition:   Home    Planned Readmission:  None  Discharge Statement:  I spent 70 minutes discharging the patient  This time was spent on the day of discharge  I had direct contact with the patient on the day of discharge  Greater than 50% of the total time was spent examining patient, answering all patient questions, arranging and discussing plan of care with patient as well as directly providing post-discharge instructions  Additional time then spent on discharge activities  Discharge Medications:  See after visit summary for reconciled discharge medications provided to patient and/or family        **Please Note: This note may have been constructed using a voice recognition system**

## 2022-11-03 NOTE — ASSESSMENT & PLAN NOTE
· TSH 5 367, T4 0 75  · Repeat TSH outpatient, thyroid levels possibly impacted by viral infection  · Patient denies symptoms at this time  ·

## 2022-11-03 NOTE — PLAN OF CARE
Problem: Potential for Falls  Goal: Patient will remain free of falls  Description: INTERVENTIONS:  - Educate patient/family on patient safety including physical limitations  - Instruct patient to call for assistance with activity   - Consult OT/PT to assist with strengthening/mobility   - Keep Call bell within reach  - Keep bed low and locked with side rails adjusted as appropriate  - Keep care items and personal belongings within reach  - Initiate and maintain comfort rounds  - Make Fall Risk Sign visible to staff  - Offer Toileting every 2 Hours, in advance of need  - Initiate/Maintain alarm  - Obtain necessary fall risk management equipment:  - Apply yellow socks and bracelet for high fall risk patients  - Consider moving patient to room near nurses station  Outcome: Progressing     Problem: Neurological Deficit  Goal: Neurological status is stable or improving  Description: Interventions:  - Monitor and assess patient's level of consciousness, motor function, sensory function, and level of assistance needed for ADLs  - Monitor and report changes from baseline  Collaborate with interdisciplinary team to initiate plan and implement interventions as ordered  - Provide and maintain a safe environment  - Consider seizure precautions  - Consider fall precautions  - Consider aspiration precautions  - Consider bleeding precautions    Outcome: Progressing     Problem: SAFETY ADULT  Goal: Patient will remain free of falls  Description: INTERVENTIONS:  - Educate patient/family on patient safety including physical limitations  - Instruct patient to call for assistance with activity   - Consult OT/PT to assist with strengthening/mobility   - Keep Call bell within reach  - Keep bed low and locked with side rails adjusted as appropriate  - Keep care items and personal belongings within reach  - Initiate and maintain comfort rounds  - Make Fall Risk Sign visible to staff  - Offer Toileting every 2 Hours, in advance of need  - Initiate/Maintain alarm  - Obtain necessary fall risk management equipment:  - Apply yellow socks and bracelet for high fall risk patients  - Consider moving patient to room near nurses station  Outcome: Progressing  Goal: Maintain or return to baseline ADL function  Description: INTERVENTIONS:  -  Assess patient's ability to carry out ADLs; assess patient's baseline for ADL function and identify physical deficits which impact ability to perform ADLs (bathing, care of mouth/teeth, toileting, grooming, dressing, etc )  - Assess/evaluate cause of self-care deficits   - Assess range of motion  - Assess patient's mobility; develop plan if impaired  - Assess patient's need for assistive devices and provide as appropriate  - Encourage maximum independence but intervene and supervise when necessary  - Involve family in performance of ADLs  - Assess for home care needs following discharge   - Consider OT consult to assist with ADL evaluation and planning for discharge  - Provide patient education as appropriate  Outcome: Progressing  Goal: Maintains/Returns to pre admission functional level  Description: INTERVENTIONS:  - Perform BMAT or MOVE assessment daily    - Set and communicate daily mobility goal to care team and patient/family/caregiver  - Collaborate with rehabilitation services on mobility goals if consulted  - Perform Range of Motion 3 times a day  - Reposition patient every 2 hours    - Dangle patient 3 times a day  - Stand patient 3 times a day  - Ambulate patient 3 times a day  - Out of bed to chair 3 times a day   - Out of bed for meals 3 times a day  - Out of bed for toileting  - Record patient progress and toleration of activity level   Outcome: Progressing     Problem: Knowledge Deficit  Goal: Patient/family/caregiver demonstrates understanding of disease process, treatment plan, medications, and discharge instructions  Description: Complete learning assessment and assess knowledge base   Interventions:  - Provide teaching at level of understanding  - Provide teaching via preferred learning methods  Outcome: Progressing     Problem: NEUROSENSORY - ADULT  Goal: Achieves stable or improved neurological status  Description: INTERVENTIONS  - Monitor and report changes in neurological status  - Monitor vital signs such as temperature, blood pressure, glucose, and any other labs ordered   - Initiate measures to prevent increased intracranial pressure  - Monitor for seizure activity and implement precautions if appropriate      Outcome: Progressing  Goal: Remains free of injury related to seizures activity  Description: INTERVENTIONS  - Maintain airway, patient safety  and administer oxygen as ordered  - Monitor patient for seizure activity, document and report duration and description of seizure to physician/advanced practitioner  - If seizure occurs,  ensure patient safety during seizure  - Reorient patient post seizure  - Seizure pads on all 4 side rails  - Instruct patient/family to notify RN of any seizure activity including if an aura is experienced  - Instruct patient/family to call for assistance with activity based on nursing assessment  - Administer anti-seizure medications if ordered    Outcome: Progressing  Goal: Achieves maximal functionality and self care  Description: INTERVENTIONS  - Monitor swallowing and airway patency with patient fatigue and changes in neurological status  - Encourage and assist patient to increase activity and self care     - Encourage visually impaired, hearing impaired and aphasic patients to use assistive/communication devices  Outcome: Progressing     Problem: CARDIOVASCULAR - ADULT  Goal: Maintains optimal cardiac output and hemodynamic stability  Description: INTERVENTIONS:  - Monitor I/O, vital signs and rhythm  - Monitor for S/S and trends of decreased cardiac output  - Administer and titrate ordered vasoactive medications to optimize hemodynamic stability  - Assess quality of pulses, skin color and temperature  - Assess for signs of decreased coronary artery perfusion  - Instruct patient to report change in severity of symptoms  Outcome: Progressing  Goal: Absence of cardiac dysrhythmias or at baseline rhythm  Description: INTERVENTIONS:  - Continuous cardiac monitoring, vital signs, obtain 12 lead EKG if ordered  - Administer antiarrhythmic and heart rate control medications as ordered  - Monitor electrolytes and administer replacement therapy as ordered  Outcome: Progressing

## 2022-11-03 NOTE — PROGRESS NOTES
Progress Note - Neurology   Wandy Ordoñez 65 y o  male [id number]  Unit/Bed#: [facility] 4 /E4 -*    Assessment/Plan:    * Bell's palsy  Assessment & Plan  65 y o  male with TOM, HLD, obesity, tobacco use, and chronic pain, presented to the ED for evaluation of multiple complaints:     · Patient reports that he woke up with chest pressure and dyspnea on exertion  · Reports feeling like his lips were being pulled to the right (transient- no longer reporting once in the ED)  · Developed numbness/tingling on left side of face with extension into the left shoulder area  · Symptoms were at their peak upon awakening the morning of admission, and then improved, but reports that he decided to "get it checked out before something worse happens"  · ED concerned for Bell's Palsy, but stroke alert activated  · BP on arrival 148/112  NIHSS 1 for mild distal sensory loss in the RLE  CT/CTA were unremarkable  · No evidence of facial droop in ED  Symmetric eyelid closure strength  · TNK deferred- patient outside window, symptoms improving, and low suspicion for vascular event  Today, patient has a very subtle droop of the left side of his upper lip, which corrects upon smiling  Eyelid closure strength remains intact  He denies any sensory deficits  MRI Brain revealed enhancement of the 7th cranial nerve, suggesting Bell's Palsy  No evidence of stroke  Plan:  - Can defer remainder of stroke pathway  - Reviewed serum labs:   · Vitamin B12 on low end of normal- recommend supplementation  · Elevated TSH, low T4- defer to primary team   · - Continue on atorvastatin 40 mg daily    · Lyme negative   - Patient wishes to try medications that may speed up recovery despite minimal deficits- Recommend prednisone 60 mg x 7 days and Valacyclovir 1g TID x 7 days  - At this time, no complaints of eye irritation and eyelid closure strength is full   If eyelid strength becomes weak or patient expresses complaints regarding dry eye, recommend artificial tears 4x daily and ointment QHS  - Does not require aspirin from a neuro standpoint  - Medical management and supportive care per primary team  Correction of any metabolic or infectious disturbances  Wandy Ordoñez will not need outpatient follow up with Neurology  He will not require outpatient neurological testing  Subjective:   Patient reports he is feeling well today  Denies any symptoms  He does have a subtle left upper lip droop at rest, that corrects with smiling  Left eyelid closure very subtly weaker than the right  Denies any changes to hearing or taste  Denies any issues with swallowing or dry eyes  He is eager for discharge at this time  Past Medical History:   Diagnosis Date   • Hyperlipidemia    • Pneumonia due to COVID-19 virus 01/13/2022     History reviewed  No pertinent surgical history  History reviewed  No pertinent family history  Social History     Socioeconomic History   • Marital status: /Civil Union     Spouse name: None   • Number of children: None   • Years of education: None   • Highest education level: None   Occupational History   • None   Tobacco Use   • Smoking status: Former Smoker     Types: Cigarettes   • Smokeless tobacco: Never Used   Vaping Use   • Vaping Use: Never used   Substance and Sexual Activity   • Alcohol use: Never     Alcohol/week: 1 0 standard drink     Types: 1 Cans of beer per week   • Drug use: No   • Sexual activity: None   Other Topics Concern   • None   Social History Narrative   • None     Social Determinants of Health     Financial Resource Strain: Low Risk    • Difficulty of Paying Living Expenses: Not hard at all   Food Insecurity: No Food Insecurity   • Worried About Running Out of Food in the Last Year: Never true   • Ran Out of Food in the Last Year: Never true   Transportation Needs: No Transportation Needs   • Lack of Transportation (Medical):  No   • Lack of Transportation (Non-Medical): No   Physical Activity: Not on file   Stress: Not on file   Social Connections: Not on file   Intimate Partner Violence: Not on file   Housing Stability: Not on file         Medications: All current active meds have been reviewed and current meds:  Scheduled Meds:  Current Facility-Administered Medications   Medication Dose Route Frequency Provider Last Rate   • amLODIPine  5 mg Oral Daily Eb Molina PA-C     • aspirin  81 mg Oral Daily Laura Guzmán DO     • atorvastatin  40 mg Oral QPM Daniel Thomas DO     • enoxaparin  40 mg Subcutaneous Q24H [address] Daniel Thomas DO       Continuous Infusions:   PRN Meds:  ROS:   Review of Systems   Neurological: Positive for facial asymmetry  All other systems reviewed and are negative  Vitals:   /88 (BP Location: Right arm)   Pulse 60   Temp 97 5 °F (36 4 °C) (Temporal)   Resp 18   Ht 5' 7 99" (1 727 m)   Wt 93 kg (205 lb)   SpO2 96%   BMI 31 18 kg/m²     Physical Exam:   Vital signs and nursing notes reviewed  Constitutional: Patient is sitting in the chair  Well developed, well nourished, in no acute distress  No diaphoresis  HENT: Normocephalic, atraumatic  Right and left external ear normal  Oropharynx clear and moist  Nose normal    Eyes: EOMs intact  No scleral icterus or injection  No discharge  Neck: Supple, normal ROM  No stridor noted  Cardiovascular: Regular rate  Pulmonary: No respiratory distress  Effort normal    Musculoskeletal: Moves all extremities spontaneously and anti-gravity  Neurological: Alert  Follows all commands  Detailed below  Skin: Warm and dry  Psychiatric: Normal mood and affect  No hallucinations or agitation  Neurologic Exam:  Mental Status: Patient is awake and alert  Oriented  Follows commands without difficulty  No dysarthria  No aphasia  Cranial Nerves: Cranial nerves 2-12 grossly intact except for subtle left upper lip droop that corrects with smiling   Symmetric eyebrow raise  Eyelid closure strength intact  Motor: Moves all extremities spontaneously and anti-gravity  No focal weakness  Sensation: Sensation to light touch intact  Coordination: No evidence of ataxia  No tremors noted  Gait: Deferred         Labs: I have personally reviewed pertinent reports  Recent Results (from the past 24 hour(s))   COVID only    Collection Time: 11/02/22  7:20 PM    Specimen: Nose; Nares   Result Value Ref Range    SARS-CoV-2 Negative Negative   Basic metabolic panel    Collection Time: 11/03/22  4:40 AM   Result Value Ref Range    Sodium 141 135 - 147 mmol/L    Potassium 3 8 3 5 - 5 3 mmol/L    Chloride 106 96 - 108 mmol/L    CO2 27 21 - 32 mmol/L    ANION GAP 8 4 - 13 mmol/L    BUN 20 5 - 25 mg/dL    Creatinine 1 20 0 60 - 1 30 mg/dL    Glucose 123 65 - 140 mg/dL    Calcium 8 8 8 3 - 10 1 mg/dL    eGFR 67 ml/min/1 73sq m       Imaging: I have personally reviewed pertinent imaging in PACS, including MRI brain,  and I have personally reviewed PACS reports  EKG, Pathology, and Other Studies: I have personally reviewed pertinent reports  VTE Prophylaxis: Enoxaparin (Lovenox)      Counseling / Coordination of Care  Total time spent today 18 minutes  Greater than 50% of total time was spent with the patient and/or family counseling and/or coordination of care  A description of the counseling/coordination of care:  Patient was seen and evaluated  Discussed with attending  Chart reviewed thoroughly including laboratory and imaging studies    Discussed MRI results Plan of care discussed with patient and primary team

## 2022-11-03 NOTE — ASSESSMENT & PLAN NOTE
· Patient presented with left-sided facial paraesthesia  · Reported history of mini stroke on aspirin 81 mg daily  · Admitted under stroke pathway, concern for Bell's palsy as well  · CT head showing no acute intracranial abnormality  · CTA showing no significant stenosis , aneurysm or dissection  · MRI showing no acute ischemia    Asymmetric enhancement of left VII nerve  · Neurology following, cleared for discharge  · Continue aspirin 81 mg daily  · Continue on statin 40 mg  · Start patient on prednisone 60 mg x 7 days, valacyclovir 1 mg t i d  x7 days per neurology recommendations  · Folate within normal range, B12  229 supplementation at discharge follow-up with PCP  · A1c 5 9  · Lipid panel [id number]-  · TSH 5 3, T4 0 75- possibly impacted by acute viral illness repeat in 1 week follow-up with PCP    Lab Results   Component Value Date    HSTNI0 4 11/01/2022    HSTNI2 3 11/01/2022    HSTNI4 4 11/01/2022

## 2022-11-03 NOTE — UTILIZATION REVIEW
NOTIFICATION OF INPATIENT ADMISSION   AUTHORIZATION REQUEST   SERVICING FACILITY:   [address] [place] [address] [facility]  Tax ID: [id number]  NPI: [id number] ATTENDING PROVIDER:  Attending Name and NPI#: Audria Nyhan, Md [[id number]]  Address: [address] [place] [address]  Phone: [phone]     ADMISSION INFORMATION:  Place of Service: Inpatient [address] [place] [address]  [address]  Place of Service Code: 21  Inpatient Admission Date/Time: 11/1/22  4:51 PM  Discharge Date/Time: No discharge date for patient encounter  Admitting Diagnosis Code/Description:  Facial weakness [R29 810]  Facial numbness [R20 0]     UTILIZATION REVIEW CONTACT:  Williams [facility] Utilization   Network Utilization Review Department  Phone: [phone]  Fax: [phone]  Email: [email or web address]  Contact for approvals/pending authorizations, clinical reviews, and discharge  PHYSICIAN ADVISORY SERVICES:  Medical Necessity Denial & [facility]du[employer] Review  Phone: [phone]  Fax: [phone]  Email: [email or web address]

## 2022-11-03 NOTE — CONSULTS
Consult - Cardiology   Wandy Ordoñez 65 y o  male MRN: [id number]  Unit/Bed#: E4 -01 Encounter: [id number]        Reason For Consult:  Chest discomfort                 Assessment:  Stroke-like symptoms (chief complaint)   Sx - left perioral/facial, lateral neck, and LUE dysesthesias/anesthesia without motor dysfunction   11/122 noncontrast CT & CTA:  No intracranial abnormality or evidence of hemodynamically significant stenosis, aneurysm, or dissection   11/2/22 MRI:  No sign of acute ischemia  Asymmetric enhancement of the left 7th nerve likely inflammatory infectious (Bell's palsy) with less probable schwannoma among differentials  Chest discomfort (reason for consultation): Atypical for angina and likely noncardiac - though plausibly related to increases in BP with symptoms occurring exclusively during periods of emotional upset  Hypertension:  On no pre-hospital Rx with presently slightly suboptimal trend with SBP is predominantly 130 - mid to upper 140s      Dyslipidemia    FLP 11/2/2022:  Cholesterol 206, triglycerides 64, HDL 43,   Right bundle-branch block  Other    * Obesity:  BMI 28    * Former smoker    Discussion / Plan:  #  patient presenting with stroke-like symptoms as discussed now resolved with the exception of some sensation of perioral "pulling or tingling”   --CVA ruled out   --Possible Bell's palsy contributing as discussed in notes of Neurology-though does not explain all facial symptoms    #  symptoms of chest discomfort atypical for angina with typically good tolerance of heavy work loads, and no evidence of objective ischemia or dysrhythmia with overall low index of suspicion for cardiac etiology - though possibly related to hypertension possibly with episodic surges in the setting of emotional upset as discussed     --No ischemic testing currently planned   --Blood pressure trend currently above therapeutic goals though no accelerated values are seen ~~> do suggest initiating antihypertensive - begin amlodipine 5 milligrams daily with assessment of affect on symptoms, and blood pressure per PCP       #  With presently suboptimal lipid levels and 10 year ASCVD risk of 7 9 percent will begin statin therapy (as well as antihypertensive as above)            History Of Present Illness:  Darrel Bang is a 81-year-old came to the [facility] Emergency Department on the afternoon of 11/1 reporting that upon waking he felt some left facial dysesthesias and possible perioral weakness reporting that when attempting to gargle water and from his mouth  With some worsening later in the day to include some dysesthesias of his face and neck and left arm pain he came to the hospital where a stroke alert was initiated  He has been seen by Neurology with neuro imaging-including MRI showing no sign of CVA  With the exception of some mild delio oral tingling he feels his symptoms of overall resolved    Today, seemingly because of reports of left arm discomfort on the day of admission, we have been asked to see the patient  No regular care by a cardiologist   In 2017, and apparently for evaluation of some complaint of chest discomfort he underwent plain exercise stress test with achievement of target heart rate and some baseline ECG abnormalities with testing not meeting diagnostic criteria for ischemia  Presently Troponin levels were normal at 4, 3, and 4  Lipids this admission showed LDL level of 150  Serial ECGs shows sinus rhythm with bifascicular block and no acute ischemic change  Echocardiogram yesterday shows normal LVEF (55%) with normal systolic wall motion and normal diastolic function  When seen, the patient tells me 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 with good tolerance of heavy levels of exertion  He denies any effort-related chest discomfort    He tells me that on the day of admission he felt facial and perioral dysesthesias as discussed  Later, concerned by this and fear of a possible stroke he states he did become somewhat excited and in this context he had some vague pressure in his left chest   He did not have radiation of this to his neck or arm but clarified for me that it was dysesthesias of his left upper extremity that he had felt  He states he at times during periods of anxiety, anger, or similar emotional upset he gets some vague fullness in his left chest or sense that his heart is beating forcefully  This is most usually triggered by stress an argument  He states that removing himself from though situations offer some relief and he has not had similar pain during other circumstances-particularly noting no associations to heavy physical activity as mentioned  Past Medical History:        Past Medical History:   Diagnosis Date   • Hyperlipidemia    • Pneumonia due to COVID-19 virus 01/13/2022    History reviewed  No pertinent surgical history  Allergy:        No Known Allergies    Medications:       Prior to Admission medications    Medication Sig Start Date End Date Taking? Authorizing Provider   aspirin (ECOTRIN LOW STRENGTH) 81 mg EC tablet Take 81 mg by mouth daily   Yes Historical Provider, MD   Diclofenac Sodium (VOLTAREN) 1 % Apply 2 g topically 4 (four) times a day as needed (knee pain) 8/26/22  Yes Enid Mendoza MD   tadalafil (CIALIS) 5 MG tablet Take 1 tablet (5 mg total) by mouth daily as needed for erectile dysfunction 11/2/22 12/2/22  Burgess Yuri MD       Family History:     History reviewed  No pertinent family history       Social History:       Social History     Socioeconomic History   • Marital status: /Civil Union     Spouse name: None   • Number of children: None   • Years of education: None   • Highest education level: None   Occupational History   • None   Tobacco Use   • Smoking status: Former Smoker     Types: Cigarettes   • Smokeless tobacco: Never Used   Vaping Use • Vaping Use: Never used   Substance and Sexual Activity   • Alcohol use: Never     Alcohol/week: 1 0 standard drink     Types: 1 Cans of beer per week   • Drug use: No   • Sexual activity: None   Other Topics Concern   • None   Social History Narrative   • None     Social Determinants of Health     Financial Resource Strain: Low Risk    • Difficulty of Paying Living Expenses: Not hard at all   Food Insecurity: No Food Insecurity   • Worried About Running Out of Food in the Last Year: Never true   • Ran Out of Food in the Last Year: Never true   Transportation Needs: No Transportation Needs   • Lack of Transportation (Medical): No   • Lack of Transportation (Non-Medical): No   Physical Activity: Not on file   Stress: Not on file   Social Connections: Not on file   Intimate Partner Violence: Not on file   Housing Stability: Not on file       ROS:  Symptoms per HPI  Occasional feelings of anxiety and emotional upset   The remainder of the review of systems is negative    Exam:  General:  Alert, normally conversant, comfortable appearing  Head: Normocephalic, atraumatic  Eyes:  EOMI  Pupils - equal, round, reactive to accomodation  No icterus  Normal Conjunctiva  Oropharynx: Moist without lesion  Neck:  No gross bruit, JVD, thyromegaly, or lymphadenopathy  Heart:  Regular with controlled rate  No rub nor pathologic murmur  Lungs:  Clear without rales/rhonchi/wheeze  Abdomen:  Soft and nontender with normal bowel sounds  No organomegaly or mass  Lower Limbs:  No edema  Pulses[de-identified]  RLE - DP:  2+                 LLE - DP:  2+  Musculoskeletal: Independent movement of limbs observed, Formal ROM and strength eval not performed  Neurologic:    Oriented to: person, place, situation  Cranial Nerves: grossly intact - vision, smell, taste, and hearing were not tested       Motor function: grossly normal, symmetric   Sensation: Was not tested      Vitals:    11/02/22 1907 11/02/22 2300 11/03/22 0337 11/03/22 0837   BP: 136/83 132/71 148/76 144/71   BP Location: Left arm Left arm Left arm Left arm   Pulse: 80 70 68 72   Resp: 18 18 18 18   Temp: 98 3 °F (36 8 °C) 97 7 °F (36 5 °C) 98 1 °F (36 7 °C) 97 5 °F (36 4 °C)   TempSrc: Temporal Temporal Temporal Temporal   SpO2: 96% 95% 97% 96%   Weight:       Height:               DATA:      -----------    ECG:                      -----------------------------------------------------------------------------------------------------------------------------------------------  Telemetry:   Normal sinus rhythm ventricular rate trend 60-80           -----------------------------------------------------------------------------------------------------------------------------------------------  Echocardiogram  11/2/2022  Interpretation Summary   •  Left Ventricle: Left ventricular cavity size is normal  Wall thickness is normal  The left ventricular ejection fraction is 55%  Systolic function is normal  Wall motion is normal  Diastolic function is normal   •  Right Ventricle: Right ventricular cavity size is normal  Systolic function is normal      Findings  Left Ventricle Left ventricular cavity size is normal  Wall thickness is normal  The left ventricular ejection fraction is 55%  Systolic function is normal   Wall motion is normal  Diastolic function is normal    Right Ventricle Right ventricular cavity size is normal  Systolic function is normal  Wall thickness is normal    Left Atrium The atrium is normal in size  Right Atrium The atrium is normal in size  Aortic Valve The aortic valve is trileaflet  The leaflets are not thickened  The leaflets are not calcified  The leaflets exhibit normal mobility  There is no evidence of regurgitation  The aortic valve has no significant stenosis  Mitral Valve Mitral valve structure is normal  There is trace regurgitation  There is no evidence of stenosis  Tricuspid Valve Tricuspid valve structure is normal  There is trace regurgitation   There is no evidence of stenosis  Pulmonic Valve Pulmonic valve structure is normal  There is trace regurgitation  There is no evidence of stenosis  Ascending Aorta The aortic root is normal in size  IVC/SVC The inferior vena cava is normal in size  Pericardium There is no pericardial effusion  The pericardium is normal in appearance  -----------------------------------------------------------------------------------------------------------------------------------------------  Weights: Wt Readings from Last 20 Encounters:   11/02/22 93 kg (205 lb)   11/02/22 93 kg (205 lb 0 4 oz)   04/04/22 89 9 kg (198 lb 1 6 oz)   01/20/22 84 7 kg (186 lb 11 2 oz)   01/10/22 83 7 kg (184 lb 8 4 oz)   12/31/21 90 kg (198 lb 6 6 oz)   09/17/21 92 1 kg (203 lb)   02/15/21 88 5 kg (195 lb)   06/22/20 92 1 kg (203 lb)   06/16/20 91 7 kg (202 lb 3 2 oz)   12/11/19 90 1 kg (198 lb 10 2 oz)   03/27/18 85 1 kg (187 lb 9 6 oz)   02/02/18 85 1 kg (187 lb 9 6 oz)   01/25/18 85 2 kg (187 lb 12 8 oz)   01/24/18 79 4 kg (175 lb)   05/11/17 81 7 kg (180 lb 2 1 oz)   01/19/17 83 kg (183 lb)   , Body mass index is 31 18 kg/m²           Lab Studies:           Results from last 7 days   Lab Units 11/02/22  0451   TRIGLYCERIDES mg/dL 64   HDL mg/dL 43     Results from last 7 days   Lab Units 11/02/22  0451 11/01/22  1511   WBC Thousand/uL 4 89 6 10   HEMOGLOBIN g/dL 14 2 14 4   HEMATOCRIT % 42 5 43 4   PLATELETS Thousands/uL 194 221   ,   Results from last 7 days   Lab Units 11/03/22  0440 11/02/22  0451 11/01/22  1511   POTASSIUM mmol/L 3 8 4 0 3 9   CHLORIDE mmol/L 106 107 107   CO2 mmol/L 27 25 30   BUN mg/dL 20 14 15   CREATININE mg/dL 1 20 1 15 1 12   CALCIUM mg/dL 8 8 8 8 9 0   ALK PHOS U/L  --  71  --    ALT U/L  --  49  --    AST U/L  --  29  --

## 2022-11-03 NOTE — DISCHARGE INSTRUCTIONS
-New medications at discharge lipitor 40 mg daily   -Norvasc 5 mg daily   -prednisone 60 mg x 7 days   -valacyclovir three a day for 7 days  -repeat TSH in one week follow up with PCP  -b12 supplement take once a week follow up with PCP

## 2022-11-04 NOTE — UTILIZATION REVIEW
NOTIFICATION OF ADMISSION DISCHARGE   This is a Notification of Discharge from [facility]  Please be advised that this patient has been discharge from our facility  Below you will find the admission and discharge date and time including the patient’s disposition  UTILIZATION REVIEW CONTACT:  Monisha Tayr  Utilization   Network Utilization Review Department  Phone: [phone] x carefully listen to the prompts  All voicemails are confidential   Email: [email or web address]     ADMISSION INFORMATION  PRESENTATION DATE: 11/1/2022  2:47 PM    INPATIENT ADMISSION DATE: 11/1/22  4:51 PM   DISCHARGE DATE: 11/3/2022  4:18 PM  DISPOSITION: Home/Self Care Home/Self Care      IMPORTANT INFORMATION:  Send all requests for admission clinical reviews, approved or denied determinations and any other requests to dedicated fax number below belonging to the campus where the patient is receiving treatment   List of dedicated fax numbers:  [address] DENIALS (Administrative/Medical Necessity) [phone]   [address] (Maternity/NICU/Pediatrics) [phone]   [facility] [phone]   [facility] 87 [phone]   [address] [phone]   220 [facility] [phone]   90 [facility] [phone]   [address] [place] [address] 119 [phone]   [facility]  [phone] [facility] [facility] [phone]   412 [facility] [address] [facility] [phone]

## 2022-11-05 ENCOUNTER — TRANSITIONAL CARE MANAGEMENT (OUTPATIENT)
Dept: FAMILY MEDICINE CLINIC | Facility: CLINIC | Age: 57
End: 2022-11-05

## 2022-11-10 ENCOUNTER — OFFICE VISIT (OUTPATIENT)
Dept: FAMILY MEDICINE CLINIC | Facility: CLINIC | Age: 57
End: 2022-11-10

## 2022-11-10 VITALS
BODY MASS INDEX: 31.95 KG/M2 | SYSTOLIC BLOOD PRESSURE: 140 MMHG | HEART RATE: 95 BPM | TEMPERATURE: 97 F | DIASTOLIC BLOOD PRESSURE: 70 MMHG | WEIGHT: 204 LBS | OXYGEN SATURATION: 97 % | RESPIRATION RATE: 18 BRPM

## 2022-11-10 DIAGNOSIS — E78.5 HYPERLIPIDEMIA, UNSPECIFIED HYPERLIPIDEMIA TYPE: ICD-10-CM

## 2022-11-10 DIAGNOSIS — N52.9 ERECTILE DYSFUNCTION, UNSPECIFIED ERECTILE DYSFUNCTION TYPE: ICD-10-CM

## 2022-11-10 DIAGNOSIS — G51.0 BELL'S PALSY: ICD-10-CM

## 2022-11-10 DIAGNOSIS — R29.810 FACIAL WEAKNESS: ICD-10-CM

## 2022-11-10 DIAGNOSIS — M25.561 CHRONIC PAIN OF BOTH KNEES: ICD-10-CM

## 2022-11-10 DIAGNOSIS — G89.29 CHRONIC PAIN OF BOTH KNEES: ICD-10-CM

## 2022-11-10 DIAGNOSIS — M25.562 CHRONIC PAIN OF BOTH KNEES: ICD-10-CM

## 2022-11-10 DIAGNOSIS — Z09 HOSPITAL DISCHARGE FOLLOW-UP: Primary | ICD-10-CM

## 2022-11-10 DIAGNOSIS — R79.89 ABNORMAL TSH: ICD-10-CM

## 2022-11-10 PROBLEM — E66.3 OVERWEIGHT (BMI 25.0-29.9): Status: RESOLVED | Noted: 2018-03-27 | Resolved: 2022-11-10

## 2022-11-10 RX ORDER — AMLODIPINE BESYLATE 5 MG/1
5 TABLET ORAL DAILY
Qty: 30 TABLET | Refills: 0 | Status: SHIPPED | OUTPATIENT
Start: 2022-11-10 | End: 2022-12-10

## 2022-11-10 RX ORDER — TADALAFIL 10 MG/1
10 TABLET ORAL DAILY PRN
Qty: 30 TABLET | Refills: 0 | Status: SHIPPED | OUTPATIENT
Start: 2022-11-10

## 2022-11-10 RX ORDER — ATORVASTATIN CALCIUM 40 MG/1
40 TABLET, FILM COATED ORAL EVERY EVENING
Qty: 30 TABLET | Refills: 0 | Status: SHIPPED | OUTPATIENT
Start: 2022-11-10 | End: 2022-12-10

## 2022-11-10 NOTE — PROGRESS NOTES
Name: Darya Miner      : 1965      MRN: [id number]  Encounter Provider: UBALDO Rodriguez  Encounter Date: 11/10/2022   Encounter department: [address] [place] [address]     1  Hospital discharge follow-up  Assessment & Plan:  -Prednisone course completed  -To check TSH & assess resolution of  Abnormal TSH level  -Recommend doing OT      2  Abnormal TSH  -     TSH, 3rd generation with Free T4 reflex; Future    3  Bell's palsy  -     Ambulatory Referral to Occupational Therapy; Future    4  Chronic pain of both knees  -     Diclofenac Sodium (VOLTAREN) 1 %; Apply 2 g topically 4 (four) times a day as needed (knee pain)    5  Hyperlipidemia, unspecified hyperlipidemia type  -     atorvastatin (LIPITOR) 40 mg tablet; Take 1 tablet (40 mg total) by mouth every evening    6  Facial weakness  -     amLODIPine (NORVASC) 5 mg tablet; Take 1 tablet (5 mg total) by mouth daily    7  Erectile dysfunction, unspecified erectile dysfunction type  -     tadalafil (CIALIS) 10 MG tablet; Take 1 tablet (10 mg total) by mouth daily as needed for erectile dysfunction         Subjective      Darya Miner is a 65 y o  male  has a past medical history of Hyperlipidemia and Pneumonia due to COVID-19 virus  has no past surgical history on file  He presents today for a TCM visit  Hospital Course:   Darya Miner is a 65 y o  male patient who originally presented to the hospital on 2022 due to left-sided facial droop, and left-sided facial numbness  He also reported chest tightness and dyspnea on exertion  Patient was admitted under stroke pathway  CT head showed no acute intracranial abnormality  Neurology was consulted recommending MRI brain  Cardiology was consulted due to patient's chest tightness and chronic dyspnea on exertion  Echo showed ejection fraction 86%, normal diastolic function normal systolic function    Cardiology recommended starting patient on 5 mg Norvasc for blood pressure control, continue on statin,  no need for outpatient cardiac testing  Patient to follow-up with PCP  MRI brain showed enhancement of the left VII nerve likely indicating Bell's palsy  Patient was started on prednisone 60 mg x 7 days, valacyclovir 1 g t i d  x7 days  Patient is to follow up with PCP for repeat TSH as was found elevated during admission  This could be impacted by acute viral illness  He completed his rx medications  He does have a slight left sided facial droop but it is improving  Otherwise, he denies any acute complaints  Review of Systems   Constitutional: Negative for chills and fever  HENT: Negative for ear pain and sore throat  Eyes: Negative for pain and visual disturbance  Respiratory: Negative for cough and shortness of breath  Cardiovascular: Negative for chest pain and palpitations  Gastrointestinal: Negative for abdominal pain and vomiting  Genitourinary: Negative for dysuria and hematuria  Musculoskeletal: Negative for arthralgias and back pain  Skin: Negative for color change and rash  Neurological: Negative for seizures and syncope  All other systems reviewed and are negative        Current Outpatient Medications on File Prior to Visit   Medication Sig   • aspirin (ECOTRIN LOW STRENGTH) 81 mg EC tablet Take 81 mg by mouth daily   • predniSONE 20 mg tablet Take 3 tablets (60 mg total) by mouth daily for 7 days   • valACYclovir (VALTREX) 1,000 mg tablet Take 1 tablet (1,000 mg total) by mouth 3 (three) times a day for 7 days   • vitamin B-12 (CYANOCOBALAMIN) 500 MCG TABS Take 2 tablets (1,000 mcg total) by mouth every 7 days for 5 doses   • [DISCONTINUED] amLODIPine (NORVASC) 5 mg tablet Take 1 tablet (5 mg total) by mouth daily   • [DISCONTINUED] atorvastatin (LIPITOR) 40 mg tablet Take 1 tablet (40 mg total) by mouth every evening   • [DISCONTINUED] Diclofenac Sodium (VOLTAREN) 1 % Apply 2 g topically 4 (four) times a day as needed (knee pain)   • [DISCONTINUED] tadalafil (CIALIS) 5 MG tablet Take 1 tablet (5 mg total) by mouth daily as needed for erectile dysfunction       Objective     /70 (BP Location: Left arm, Patient Position: Sitting, Cuff Size: Adult)   Pulse 95   Temp (!) 97 °F (36 1 °C) (Temporal)   Resp 18   Wt 92 5 kg (204 lb)   SpO2 97%   BMI 31 95 kg/m²     Physical Exam  Vitals and nursing note reviewed  Constitutional:       General: He is not in acute distress  Appearance: He is obese  He is not ill-appearing  HENT:      Head: Normocephalic and atraumatic  Right Ear: External ear normal  There is no impacted cerumen  Left Ear: External ear normal  There is no impacted cerumen  Nose: Nose normal  No congestion  Mouth/Throat:      Comments: Slight left sided facial droop  Eyes:      Pupils: Pupils are equal, round, and reactive to light  Cardiovascular:      Rate and Rhythm: Normal rate and regular rhythm  Pulses: Normal pulses  Heart sounds: Normal heart sounds  No murmur heard  Pulmonary:      Effort: Pulmonary effort is normal       Breath sounds: Normal breath sounds  Abdominal:      General: Bowel sounds are normal       Palpations: Abdomen is soft  Tenderness: There is no abdominal tenderness  Musculoskeletal:         General: No tenderness  Normal range of motion  Cervical back: Normal range of motion  No tenderness  Skin:     General: Skin is warm and dry  Neurological:      General: No focal deficit present  Mental Status: He is alert and oriented to person, place, and time  Psychiatric:         Mood and Affect: Mood normal          Behavior: Behavior normal          Thought Content:  Thought content normal          Judgment: Judgment normal        [facility]

## 2022-11-10 NOTE — ASSESSMENT & PLAN NOTE
BMI Counseling: Body mass index is 31 95 kg/m²  The BMI is above normal  Nutrition recommendations include reducing portion sizes, decreasing overall calorie intake, 3-5 servings of fruits/vegetables daily, reducing fast food intake, consuming healthier snacks, decreasing soda and/or juice intake, moderation in carbohydrate intake, increasing intake of lean protein, reducing intake of saturated fat and trans fat and reducing intake of cholesterol  Exercise recommendations include moderate aerobic physical activity for 150 minutes/week

## 2022-11-10 NOTE — ASSESSMENT & PLAN NOTE
-Prednisone course completed    -To check TSH & assess resolution of  Abnormal TSH level  -Recommend doing OT

## 2022-12-02 ENCOUNTER — TELEPHONE (OUTPATIENT)
Dept: FAMILY MEDICINE CLINIC | Facility: CLINIC | Age: 57
End: 2022-12-02

## 2022-12-02 DIAGNOSIS — G51.0 BELL'S PALSY: Primary | ICD-10-CM

## 2023-03-20 DIAGNOSIS — N52.9 ERECTILE DYSFUNCTION, UNSPECIFIED ERECTILE DYSFUNCTION TYPE: ICD-10-CM

## 2023-03-20 DIAGNOSIS — M25.561 CHRONIC PAIN OF BOTH KNEES: ICD-10-CM

## 2023-03-20 DIAGNOSIS — M25.562 CHRONIC PAIN OF BOTH KNEES: ICD-10-CM

## 2023-03-20 DIAGNOSIS — G89.29 CHRONIC PAIN OF BOTH KNEES: ICD-10-CM

## 2023-03-20 RX ORDER — TADALAFIL 10 MG/1
10 TABLET ORAL DAILY PRN
Qty: 30 TABLET | Refills: 0 | Status: SHIPPED | OUTPATIENT
Start: 2023-03-20

## 2023-07-10 DIAGNOSIS — N52.9 ERECTILE DYSFUNCTION, UNSPECIFIED ERECTILE DYSFUNCTION TYPE: ICD-10-CM

## 2023-07-10 DIAGNOSIS — E78.5 HYPERLIPIDEMIA, UNSPECIFIED HYPERLIPIDEMIA TYPE: ICD-10-CM

## 2023-07-11 RX ORDER — TADALAFIL 10 MG/1
TABLET ORAL
Qty: 30 TABLET | Refills: 0 | Status: SHIPPED | OUTPATIENT
Start: 2023-07-11 | End: 2023-07-17

## 2023-07-11 RX ORDER — ATORVASTATIN CALCIUM 40 MG/1
TABLET, FILM COATED ORAL
Qty: 30 TABLET | Refills: 0 | Status: SHIPPED | OUTPATIENT
Start: 2023-07-11

## 2023-07-17 ENCOUNTER — APPOINTMENT (OUTPATIENT)
Dept: LAB | Facility: CLINIC | Age: 58
End: 2023-07-17
Payer: MEDICARE

## 2023-07-17 ENCOUNTER — OFFICE VISIT (OUTPATIENT)
Dept: FAMILY MEDICINE CLINIC | Facility: CLINIC | Age: 58
End: 2023-07-17

## 2023-07-17 VITALS
DIASTOLIC BLOOD PRESSURE: 76 MMHG | BODY MASS INDEX: 32.18 KG/M2 | SYSTOLIC BLOOD PRESSURE: 138 MMHG | WEIGHT: 205 LBS | RESPIRATION RATE: 18 BRPM | HEIGHT: 67 IN | TEMPERATURE: 97.8 F | HEART RATE: 75 BPM | OXYGEN SATURATION: 98 %

## 2023-07-17 DIAGNOSIS — G47.33 OSA (OBSTRUCTIVE SLEEP APNEA): ICD-10-CM

## 2023-07-17 DIAGNOSIS — Z00.00 ANNUAL PHYSICAL EXAM: Primary | ICD-10-CM

## 2023-07-17 DIAGNOSIS — M77.11 LATERAL EPICONDYLITIS OF BOTH ELBOWS: ICD-10-CM

## 2023-07-17 DIAGNOSIS — E78.5 HYPERLIPIDEMIA, UNSPECIFIED HYPERLIPIDEMIA TYPE: ICD-10-CM

## 2023-07-17 DIAGNOSIS — G89.29 CHRONIC PAIN OF BOTH KNEES: ICD-10-CM

## 2023-07-17 DIAGNOSIS — G89.29 CHRONIC BILATERAL LOW BACK PAIN WITHOUT SCIATICA: ICD-10-CM

## 2023-07-17 DIAGNOSIS — Z12.5 SCREENING PSA (PROSTATE SPECIFIC ANTIGEN): ICD-10-CM

## 2023-07-17 DIAGNOSIS — R79.89 ABNORMAL TSH: ICD-10-CM

## 2023-07-17 DIAGNOSIS — M25.511 PAIN OF BOTH SHOULDER JOINTS: ICD-10-CM

## 2023-07-17 DIAGNOSIS — E53.8 B12 DEFICIENCY: ICD-10-CM

## 2023-07-17 DIAGNOSIS — E66.9 OBESITY (BMI 30-39.9): ICD-10-CM

## 2023-07-17 DIAGNOSIS — M54.50 CHRONIC BILATERAL LOW BACK PAIN WITHOUT SCIATICA: ICD-10-CM

## 2023-07-17 DIAGNOSIS — M77.12 LATERAL EPICONDYLITIS OF BOTH ELBOWS: ICD-10-CM

## 2023-07-17 DIAGNOSIS — M25.561 CHRONIC PAIN OF BOTH KNEES: ICD-10-CM

## 2023-07-17 DIAGNOSIS — R79.89 ELEVATED TSH: ICD-10-CM

## 2023-07-17 DIAGNOSIS — M25.562 CHRONIC PAIN OF BOTH KNEES: ICD-10-CM

## 2023-07-17 DIAGNOSIS — I10 PRIMARY HYPERTENSION: ICD-10-CM

## 2023-07-17 DIAGNOSIS — M25.512 PAIN OF BOTH SHOULDER JOINTS: ICD-10-CM

## 2023-07-17 DIAGNOSIS — G47.9 SLEEP TROUBLE: ICD-10-CM

## 2023-07-17 DIAGNOSIS — N52.9 ERECTILE DYSFUNCTION, UNSPECIFIED ERECTILE DYSFUNCTION TYPE: ICD-10-CM

## 2023-07-17 PROBLEM — Z09 HOSPITAL DISCHARGE FOLLOW-UP: Status: RESOLVED | Noted: 2022-11-10 | Resolved: 2023-07-17

## 2023-07-17 LAB
CHOLEST SERPL-MCNC: 220 MG/DL
HDLC SERPL-MCNC: 45 MG/DL
LDLC SERPL CALC-MCNC: 147 MG/DL (ref 0–100)
T4 FREE SERPL-MCNC: 0.59 NG/DL (ref 0.61–1.12)
TRIGL SERPL-MCNC: 140 MG/DL
TSH SERPL DL<=0.05 MIU/L-ACNC: 5.71 UIU/ML (ref 0.45–4.5)

## 2023-07-17 PROCEDURE — 84153 ASSAY OF PSA TOTAL: CPT

## 2023-07-17 PROCEDURE — 36415 COLL VENOUS BLD VENIPUNCTURE: CPT

## 2023-07-17 PROCEDURE — 99396 PREV VISIT EST AGE 40-64: CPT

## 2023-07-17 PROCEDURE — 99214 OFFICE O/P EST MOD 30 MIN: CPT

## 2023-07-17 PROCEDURE — 84154 ASSAY OF PSA FREE: CPT

## 2023-07-17 PROCEDURE — 84439 ASSAY OF FREE THYROXINE: CPT

## 2023-07-17 PROCEDURE — 80061 LIPID PANEL: CPT

## 2023-07-17 PROCEDURE — 84443 ASSAY THYROID STIM HORMONE: CPT

## 2023-07-17 RX ORDER — TADALAFIL 5 MG/1
5 TABLET ORAL DAILY PRN
Qty: 10 TABLET | Refills: 0 | Status: SHIPPED | OUTPATIENT
Start: 2023-07-17

## 2023-07-17 RX ORDER — CYANOCOBALAMIN (VITAMIN B-12) 500 MCG
1000 TABLET ORAL
Qty: 10 TABLET | Refills: 0 | Status: SHIPPED | OUTPATIENT
Start: 2023-07-17 | End: 2023-08-15

## 2023-07-17 RX ORDER — TRAZODONE HYDROCHLORIDE 50 MG/1
50 TABLET ORAL
Qty: 90 TABLET | Refills: 2 | Status: SHIPPED | OUTPATIENT
Start: 2023-07-17

## 2023-07-17 NOTE — ASSESSMENT & PLAN NOTE
Noncompliant with sleep apnea mask 2/2 discomfort. Advised that this is likely contributing to his sleeping problems.  Recommend sleep medicine follow-up

## 2023-07-17 NOTE — ASSESSMENT & PLAN NOTE
- reports good sleep hygiene. He does take 1-3 hour naps but he reports that even when he does not nap, he still can only sleep about 3 hours a night. Tried melatonin- not effective. Trial trazodone.  Counseled on good sleep hygiene

## 2023-07-17 NOTE — PROGRESS NOTES
[address] [facility] PRACTICE [facility]YLE    NAME: Marcio Gomez  AGE: 63 y.o. SEX: male  : 1965     DATE: 2023     Assessment and Plan:     Problem List Items Addressed This Visit        Respiratory    TOM (obstructive sleep apnea)     Noncompliant with sleep apnea mask 2/2 discomfort. Advised that this is likely contributing to his sleeping problems. Recommend sleep medicine follow-up            Cardiovascular and Mediastinum    Hypertension     BP Readings from Last 3 Encounters:   23 138/76   11/10/22 140/70   22 138/76   - At goal  - reports he stopped amlodipine.  -reecommend eating a low salt diet (such as the DASH diet), limiting alcohol, exercising, and wt loss. Continue with lifestyle management              Musculoskeletal and Integument    Lateral epicondylitis of both elbows    Relevant Medications    traZODone (DESYREL) 50 mg tablet       Other    Hyperlipidemia     Noncompliant with Lipitor. Will check lipid panel. If levels remain elevated, start Lipitor. Relevant Orders    Lipid Panel with Direct LDL reflex    Erectile dysfunction     - Continue with cialis prn         Relevant Medications    tadalafil (CIALIS) 5 MG tablet    Back pain    Relevant Medications    Diclofenac Sodium (VOLTAREN) 1 %    Chronic pain of both knees    Relevant Medications    Diclofenac Sodium (VOLTAREN) 1 %    Pain of both shoulder joints    Relevant Medications    Diclofenac Sodium (VOLTAREN) 1 %    Obesity (BMI 30-39. 9)     BMI Counseling: Body mass index is 32.11 kg/m².  The BMI is above normal. Nutrition recommendations include reducing portion sizes, decreasing overall calorie intake, 3-5 servings of fruits/vegetables daily, reducing fast food intake, consuming healthier snacks, decreasing soda and/or juice intake, moderation in carbohydrate intake, increasing intake of lean protein, reducing intake of saturated fat and trans fat and reducing intake of cholesterol. Exercise recommendations include moderate aerobic physical activity for 150 minutes/week. Elevated TSH     - Reminded patient to have TSH level checked         Sleep trouble     - reports good sleep hygiene. He does take 1-3 hour naps but he reports that even when he does not nap, he still can only sleep about 3 hours a night. Tried melatonin- not effective. Trial trazodone. Counseled on good sleep hygiene         Relevant Medications    traZODone (DESYREL) 50 mg tablet   Other Visit Diagnoses     Annual physical exam    -  Primary    Screening PSA (prostate specific antigen)        Relevant Orders    PSA, total and free    B12 deficiency        Relevant Medications    vitamin B-12 (CYANOCOBALAMIN) 500 MCG TABS          Immunizations and preventive care screenings were discussed with patient today. Appropriate education was printed on patient's after visit summary. Discussed risks and benefits of prostate cancer screening. We discussed the controversial history of PSA screening for prostate cancer in the [facility] as well as the risk of over detection and over treatment of prostate cancer by way of PSA screening. The patient understands that PSA blood testing is an imperfect way to screen for prostate cancer and that elevated PSA levels in the blood may also be caused by infection, inflammation, prostatic trauma or manipulation, urological procedures, or by benign prostatic enlargement. The role of the digital rectal examination in prostate cancer screening was also discussed and I discussed with him that there is large interobserver variability in the findings of digital rectal examination. Counseling:  Alcohol/drug use: discussed moderation in alcohol intake, the recommendations for healthy alcohol use, and avoidance of illicit drug use. Dental Health: discussed importance of regular tooth brushing, flossing, and dental visits.   Injury prevention: discussed safety/seat belts, safety helmets, smoke detectors, carbon dioxide detectors, and smoking near bedding or upholstery. Sexual health: discussed sexually transmitted diseases, partner selection, use of condoms, avoidance of unintended pregnancy, and contraceptive alternatives. · Exercise: the importance of regular exercise/physical activity was discussed. Recommend exercise 3-5 times per week for at least 30 minutes. BMI Counseling: Body mass index is 32.11 kg/m². The BMI is above normal. Nutrition recommendations include decreasing portion sizes, encouraging healthy choices of fruits and vegetables, decreasing fast food intake, consuming healthier snacks, limiting drinks that contain sugar, moderation in carbohydrate intake, increasing intake of lean protein, reducing intake of saturated and trans fat and reducing intake of cholesterol. Exercise recommendations include moderate physical activity 150 minutes/week. No pharmacotherapy was ordered. Rationale for BMI follow-up plan is due to patient being overweight or obese. Depression Screening and Follow-up Plan: Patient was screened for depression during today's encounter. They screened negative with a PHQ-2 score of 0. Return in about 6 months (around 1/17/2024) for Recheck chronic conditions. Chief Complaint:     Chief Complaint   Patient presents with   • Temporomandibular Joint Pain     Med refills      History of Present Illness:     Adult Annual Physical   Patient here for a comprehensive physical exam.     Diet and Physical Activity  · Diet/Nutrition: well balanced diet, limited junk food, low calorie diet, low fat diet, low carb diet, consuming 3-5 servings of fruits/vegetables daily, adequate fiber intake and adequate whole grain intake. · Exercise: walking.       Depression Screening  PHQ-2/9 Depression Screening    Little interest or pleasure in doing things: 0 - not at all  Feeling down, depressed, or hopeless: 0 - not at all  PHQ-2 Score: 0  PHQ-2 Interpretation: Negative depression screen       General Health  · Sleep: sleeps poorly and gets 1-3 hours of sleep on average. · Hearing: normal - bilateral.  · Vision: goes for regular eye exams and wears glasses. · Dental: regular dental visits, brushes teeth three times daily and does not floss.  Health  · Symptoms include: none     Review of Systems:     Review of Systems   Constitutional: Negative for chills and fever. HENT: Negative for ear pain and sore throat. Eyes: Negative for pain and visual disturbance. Respiratory: Negative for cough and shortness of breath. Cardiovascular: Negative for chest pain and palpitations. Gastrointestinal: Negative for abdominal pain and vomiting. Genitourinary: Negative for dysuria and hematuria. Musculoskeletal: Positive for arthralgias, back pain and myalgias. Skin: Negative for color change and rash. Neurological: Negative for seizures and syncope. Psychiatric/Behavioral: Positive for sleep disturbance. All other systems reviewed and are negative. Past Medical History:     Past Medical History:   Diagnosis Date   • Hyperlipidemia    • Pneumonia due to COVID-19 virus 01/13/2022      Past Surgical History:     History reviewed. No pertinent surgical history. Family History:     History reviewed. No pertinent family history.    Social History:     Social History     Socioeconomic History   • Marital status: /Civil Union     Spouse name: None   • Number of children: None   • Years of education: None   • Highest education level: None   Occupational History   • None   Tobacco Use   • Smoking status: Former     Types: Cigarettes     Passive exposure: Past   • Smokeless tobacco: Never   Vaping Use   • Vaping Use: Never used   Substance and Sexual Activity   • Alcohol use: Never     Alcohol/week: 1.0 standard drink of alcohol     Types: 1 Cans of beer per week   • Drug use: No   • Sexual activity: None   Other Topics Concern   • None   Social History Narrative   • None     Social Determinants of Health     Financial Resource Strain: Medium Risk (7/13/2023)    Overall Financial Resource Strain (CARDIA)    • Difficulty of Paying Living Expenses: Somewhat hard   Food Insecurity: Food Insecurity Present (7/13/2023)    Hunger Vital Sign    • Worried About Running Out of Food in the Last Year: Often true    • Ran Out of Food in the Last Year: Sometimes true   Transportation Needs: No Transportation Needs (7/13/2023)    PRAPARE - Transportation    • Lack of Transportation (Medical): No    • Lack of Transportation (Non-Medical): No   Physical Activity: Not on file   Stress: Not on file   Social Connections: Not on file   Intimate Partner Violence: Not on file   Housing Stability: Not on file      Current Medications:     Current Outpatient Medications   Medication Sig Dispense Refill   • Diclofenac Sodium (VOLTAREN) 1 % Apply 2 g topically 4 (four) times a day as needed (knee pain) 350 g 0   • tadalafil (CIALIS) 5 MG tablet Take 1 tablet (5 mg total) by mouth daily as needed for erectile dysfunction 10 tablet 0   • traZODone (DESYREL) 50 mg tablet Take 1 tablet (50 mg total) by mouth daily at bedtime 90 tablet 2   • vitamin B-12 (CYANOCOBALAMIN) 500 MCG TABS Take 2 tablets (1,000 mcg total) by mouth every 7 days for 5 doses 10 tablet 0   • aspirin (ECOTRIN LOW STRENGTH) 81 mg EC tablet Take 81 mg by mouth daily     • atorvastatin (LIPITOR) 40 mg tablet take 1 tablet by mouth every evening 30 tablet 0     No current facility-administered medications for this visit. Allergies:     No Known Allergies   Physical Exam:     /76 (BP Location: Left arm, Patient Position: Sitting, Cuff Size: Standard)   Pulse 75   Temp 97.8 °F (36.6 °C) (Temporal)   Resp 18   Ht 5' 7" (1.702 m)   Wt 93 kg (205 lb)   SpO2 98%   BMI 32.11 kg/m²     Physical Exam  Vitals and nursing note reviewed.    Constitutional: Appearance: He is well-developed. He is obese. HENT:      Head: Normocephalic and atraumatic. Right Ear: External ear normal.      Left Ear: External ear normal.      Nose: Nose normal.      Mouth/Throat:      Mouth: Mucous membranes are moist.      Pharynx: Oropharynx is clear. Eyes:      Extraocular Movements: Extraocular movements intact. Conjunctiva/sclera: Conjunctivae normal.      Pupils: Pupils are equal, round, and reactive to light. Cardiovascular:      Rate and Rhythm: Normal rate and regular rhythm. Pulses: Normal pulses. Heart sounds: Normal heart sounds. No murmur heard. Pulmonary:      Effort: Pulmonary effort is normal. No respiratory distress. Breath sounds: Normal breath sounds. Abdominal:      General: Bowel sounds are normal.      Palpations: Abdomen is soft. Tenderness: There is no abdominal tenderness. Musculoskeletal:         General: Normal range of motion. Cervical back: Normal range of motion and neck supple. Skin:     General: Skin is warm and dry. Capillary Refill: Capillary refill takes less than 2 seconds. Neurological:      Mental Status: He is alert and oriented to person, place, and time. Mental status is at baseline. Psychiatric:         Mood and Affect: Mood normal.         Behavior: Behavior normal.         Thought Content:  Thought content normal.         Judgment: Judgment normal.          Jayde Kay, [address] [facility]

## 2023-07-17 NOTE — ASSESSMENT & PLAN NOTE
BP Readings from Last 3 Encounters:   07/17/23 138/76   11/10/22 140/70   11/03/22 138/76   - At goal  - reports he stopped amlodipine.  -reecommend eating a low salt diet (such as the DASH diet), limiting alcohol, exercising, and wt loss.  Continue with lifestyle management

## 2023-07-17 NOTE — ASSESSMENT & PLAN NOTE
BMI Counseling: Body mass index is 32.11 kg/m². The BMI is above normal. Nutrition recommendations include reducing portion sizes, decreasing overall calorie intake, 3-5 servings of fruits/vegetables daily, reducing fast food intake, consuming healthier snacks, decreasing soda and/or juice intake, moderation in carbohydrate intake, increasing intake of lean protein, reducing intake of saturated fat and trans fat and reducing intake of cholesterol. Exercise recommendations include moderate aerobic physical activity for 150 minutes/week.

## 2023-07-18 LAB
PSA FREE MFR SERPL: 32 %
PSA FREE SERPL-MCNC: 0.32 NG/ML
PSA SERPL-MCNC: 1 NG/ML (ref 0–4)

## 2023-07-19 DIAGNOSIS — E03.9 ACQUIRED HYPOTHYROIDISM: Primary | ICD-10-CM

## 2023-07-19 DIAGNOSIS — R79.89 ELEVATED TSH: ICD-10-CM

## 2023-07-19 RX ORDER — LEVOTHYROXINE SODIUM 0.05 MG/1
50 TABLET ORAL DAILY
Qty: 90 TABLET | Refills: 0 | Status: SHIPPED | OUTPATIENT
Start: 2023-07-19

## 2023-09-13 DIAGNOSIS — N52.9 ERECTILE DYSFUNCTION, UNSPECIFIED ERECTILE DYSFUNCTION TYPE: ICD-10-CM

## 2023-09-14 RX ORDER — TADALAFIL 5 MG/1
5 TABLET ORAL DAILY PRN
Qty: 10 TABLET | Refills: 0 | Status: SHIPPED | OUTPATIENT
Start: 2023-09-14

## 2023-10-31 DIAGNOSIS — N52.9 ERECTILE DYSFUNCTION, UNSPECIFIED ERECTILE DYSFUNCTION TYPE: ICD-10-CM

## 2023-10-31 RX ORDER — TADALAFIL 5 MG/1
5 TABLET ORAL DAILY PRN
Qty: 10 TABLET | Refills: 0 | Status: SHIPPED | OUTPATIENT
Start: 2023-10-31

## 2023-12-11 DIAGNOSIS — M25.511 PAIN OF BOTH SHOULDER JOINTS: ICD-10-CM

## 2023-12-11 DIAGNOSIS — M25.561 CHRONIC PAIN OF BOTH KNEES: ICD-10-CM

## 2023-12-11 DIAGNOSIS — E53.8 B12 DEFICIENCY: ICD-10-CM

## 2023-12-11 DIAGNOSIS — M77.11 LATERAL EPICONDYLITIS OF BOTH ELBOWS: ICD-10-CM

## 2023-12-11 DIAGNOSIS — M25.562 CHRONIC PAIN OF BOTH KNEES: ICD-10-CM

## 2023-12-11 DIAGNOSIS — M54.50 CHRONIC BILATERAL LOW BACK PAIN WITHOUT SCIATICA: ICD-10-CM

## 2023-12-11 DIAGNOSIS — N52.9 ERECTILE DYSFUNCTION, UNSPECIFIED ERECTILE DYSFUNCTION TYPE: ICD-10-CM

## 2023-12-11 DIAGNOSIS — G47.9 SLEEP TROUBLE: ICD-10-CM

## 2023-12-11 DIAGNOSIS — M77.12 LATERAL EPICONDYLITIS OF BOTH ELBOWS: ICD-10-CM

## 2023-12-11 DIAGNOSIS — E78.5 HYPERLIPIDEMIA, UNSPECIFIED HYPERLIPIDEMIA TYPE: ICD-10-CM

## 2023-12-11 DIAGNOSIS — G89.29 CHRONIC PAIN OF BOTH KNEES: ICD-10-CM

## 2023-12-11 DIAGNOSIS — M25.512 PAIN OF BOTH SHOULDER JOINTS: ICD-10-CM

## 2023-12-11 DIAGNOSIS — G89.29 CHRONIC BILATERAL LOW BACK PAIN WITHOUT SCIATICA: ICD-10-CM

## 2023-12-11 DIAGNOSIS — E03.9 ACQUIRED HYPOTHYROIDISM: ICD-10-CM

## 2023-12-11 RX ORDER — LEVOTHYROXINE SODIUM 0.05 MG/1
50 TABLET ORAL DAILY
Qty: 90 TABLET | Refills: 0 | Status: SHIPPED | OUTPATIENT
Start: 2023-12-11

## 2023-12-11 RX ORDER — ATORVASTATIN CALCIUM 40 MG/1
40 TABLET, FILM COATED ORAL EVERY EVENING
Qty: 30 TABLET | Refills: 0 | Status: SHIPPED | OUTPATIENT
Start: 2023-12-11

## 2023-12-11 RX ORDER — TADALAFIL 5 MG/1
5 TABLET ORAL DAILY PRN
Qty: 10 TABLET | Refills: 0 | Status: SHIPPED | OUTPATIENT
Start: 2023-12-11

## 2023-12-11 RX ORDER — TRAZODONE HYDROCHLORIDE 50 MG/1
50 TABLET ORAL
Qty: 90 TABLET | Refills: 0 | Status: SHIPPED | OUTPATIENT
Start: 2023-12-11

## 2023-12-11 RX ORDER — CYANOCOBALAMIN (VITAMIN B-12) 500 MCG
1000 TABLET ORAL
Qty: 10 TABLET | Refills: 0 | Status: SHIPPED | OUTPATIENT
Start: 2023-12-11 | End: 2024-01-09

## 2024-01-17 ENCOUNTER — APPOINTMENT (EMERGENCY)
Dept: CT IMAGING | Facility: HOSPITAL | Age: 59
End: 2024-01-17
Payer: MEDICARE

## 2024-01-17 ENCOUNTER — HOSPITAL ENCOUNTER (EMERGENCY)
Facility: HOSPITAL | Age: 59
Discharge: HOME | End: 2024-01-17
Attending: EMERGENCY MEDICINE
Payer: MEDICARE

## 2024-01-17 ENCOUNTER — APPOINTMENT (EMERGENCY)
Dept: RADIOLOGY | Facility: HOSPITAL | Age: 59
End: 2024-01-17
Payer: MEDICARE

## 2024-01-17 VITALS
WEIGHT: 212.3 LBS | HEART RATE: 71 BPM | BODY MASS INDEX: 33.25 KG/M2 | RESPIRATION RATE: 18 BRPM | DIASTOLIC BLOOD PRESSURE: 77 MMHG | OXYGEN SATURATION: 97 % | SYSTOLIC BLOOD PRESSURE: 138 MMHG
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
## 2024-01-17 DIAGNOSIS — S01.112A LEFT EYELID LACERATION, INITIAL ENCOUNTER: ICD-10-CM

## 2024-01-17 DIAGNOSIS — M25.512 LEFT SHOULDER PAIN: ICD-10-CM

## 2024-01-17 DIAGNOSIS — M62.838 MUSCLE SPASM: ICD-10-CM

## 2024-01-17 PROCEDURE — 73030 X-RAY EXAM OF SHOULDER: CPT

## 2024-01-17 PROCEDURE — 70450 CT HEAD/BRAIN W/O DYE: CPT

## 2024-01-17 PROCEDURE — 12011 RPR F/E/E/N/L/M 2.5 CM/<: CPT | Performed by: EMERGENCY MEDICINE

## 2024-01-17 PROCEDURE — 99284 EMERGENCY DEPT VISIT MOD MDM: CPT | Performed by: EMERGENCY MEDICINE

## 2024-01-17 PROCEDURE — 99284 EMERGENCY DEPT VISIT MOD MDM: CPT

## 2024-01-17 PROCEDURE — G1004 CDSM NDSC: HCPCS

## 2024-01-17 RX ORDER — CYCLOBENZAPRINE HCL 10 MG
10 TABLET ORAL ONCE
Status: COMPLETED | OUTPATIENT
Start: 2024-01-17 | End: 2024-01-17

## 2024-01-17 RX ORDER — KETOROLAC TROMETHAMINE 30 MG/ML
1 INJECTION, SOLUTION INTRAMUSCULAR; INTRAVENOUS ONCE
Status: COMPLETED | OUTPATIENT
Start: 2024-01-17 | End: 2024-01-17

## 2024-01-17 RX ORDER — LIDOCAINE 50 MG/G
1 PATCH TOPICAL ONCE
Status: DISCONTINUED | OUTPATIENT
Start: 2024-01-17 | End: 2024-01-17 | Stop reason: HOSPADM

## 2024-01-17 RX ORDER — LIDOCAINE 50 MG/G
1 PATCH TOPICAL DAILY
Qty: 7 PATCH | Refills: 0 | Status: SHIPPED | OUTPATIENT
Start: 2024-01-17 | End: 2024-01-24

## 2024-01-17 RX ORDER — CYCLOBENZAPRINE HCL 10 MG
10 TABLET ORAL 2 TIMES DAILY PRN
Qty: 20 TABLET | Refills: 0 | Status: SHIPPED | OUTPATIENT
Start: 2024-01-17

## 2024-01-17 RX ORDER — ACETAMINOPHEN 500 MG
1000 TABLET ORAL EVERY 6 HOURS PRN
Qty: 20 TABLET | Refills: 0 | Status: SHIPPED | OUTPATIENT
Start: 2024-01-17 | End: 2024-01-22

## 2024-01-17 RX ORDER — ACETAMINOPHEN 325 MG/1
975 TABLET ORAL ONCE
Status: COMPLETED | OUTPATIENT
Start: 2024-01-17 | End: 2024-01-17

## 2024-01-17 RX ADMIN — LIDOCAINE 5% 1 PATCH: 700 PATCH TOPICAL at 07:06

## 2024-01-17 RX ADMIN — CYCLOBENZAPRINE HYDROCHLORIDE 10 MG: 10 TABLET, FILM COATED ORAL at 07:05

## 2024-01-17 RX ADMIN — ACETAMINOPHEN 325MG 975 MG: 325 TABLET ORAL at 07:06

## 2024-01-17 NOTE — ED ATTENDING ATTESTATION
1/17/2024  I, Giorgio Mejia MD, saw and evaluated the patient. I have discussed the patient with the resident/non-physician practitioner and agree with the resident's/non-physician practitioner's findings, Plan of Care, and MDM as documented in the resident's/non-physician practitioner's note, except where noted. All available labs and Radiology studies were reviewed.  I was present for key portions of any procedure(s) performed by the resident/non-physician practitioner and I was immediately available to provide assistance.       At this point I agree with the current assessment done in the Emergency Department.  I have conducted an independent evaluation of this patient a history and physical is as follows:    Fall going to the bathroom, struck his face, denies loss of consciousness. Frontal headache where he struck his head. Patient denies preceding lightheadedness, chest pain, shortness of breath, palpitations.    Gen: Pt is in NAD  HEENT: Head is atraumatic, EOM's intact, neck has FROM  Chest: CTAB, non-tender  Heart: RRR  Abdomen: Soft, NT/ND  Musculoskeletal: FROM in all extremities  Skin: No rash, no ecchymosis, abrasions to Left knee. Facial laceration.  Neuro: Awake, alert, oriented x4; Cranial nerves II-XII intact  Psych: Normal affect    MDM -  Will CT head, x-ray Left shoulder.    ED Course         Critical Care Time  Procedures

## 2024-01-17 NOTE — DISCHARGE INSTRUCTIONS
Thank you for coming to the ER today. Please follow up with your primary care doctor in 1-2 days to be re-evaluated. If at any point you experience any new or worsening symptoms do not hesitate to come back to the hospital to be evaluated. Thank you and hope you have a great rest of your day.

## 2024-01-17 NOTE — Clinical Note
Perico Roy was seen and treated in our emergency department on 1/17/2024.    No restrictions            Diagnosis:     Perico  may return to work on return date.    He may return on this date: 01/19/2024         If you have any questions or concerns, please don't hesitate to call.      Annette Maria Palladino, DO    ______________________________           _______________          _______________  Hospital Representative                              Date                                Time

## 2024-01-17 NOTE — ED PROVIDER NOTES
History  Chief Complaint   Patient presents with    Fall     Pt c/o back/neck and ankle pain after pt slipped coming down steps inside his house this morning. Pt denies thinners or any LOC. Pt denies cp/sob/n/v/d or fevers     58-year-old male presenting to the emergency department for evaluation after mechanical fall.  He notes that he was walking down a stair and hit his head. He denies any LOC.  Denies any aspirin or blood thinners.  He notes a headache.  He denies any vision changes tinnitus neck pain back pain numbness tingling in any of his extremities.  He also notes left shoulder pain.  He notes pain on his right knee where he has a cut.  Otherwise patient able to ambulate without difficulty.  No other complaints.        Prior to Admission Medications   Prescriptions Last Dose Informant Patient Reported? Taking?   Diclofenac Sodium (VOLTAREN) 1 %   No No   Sig: Apply 2 g topically 4 (four) times a day as needed (knee pain)   aspirin (ECOTRIN LOW STRENGTH) 81 mg EC tablet   Yes No   Sig: Take 81 mg by mouth daily   atorvastatin (LIPITOR) 40 mg tablet   No No   Sig: Take 1 tablet (40 mg total) by mouth every evening   levothyroxine (Synthroid) 50 mcg tablet   No No   Sig: Take 1 tablet (50 mcg total) by mouth daily   tadalafil (CIALIS) 5 MG tablet   No No   Sig: Take 1 tablet (5 mg total) by mouth daily as needed for erectile dysfunction   traZODone (DESYREL) 50 mg tablet   No No   Sig: Take 1 tablet (50 mg total) by mouth daily at bedtime   vitamin B-12 (CYANOCOBALAMIN) 500 MCG TABS   No No   Sig: Take 2 tablets (1,000 mcg total) by mouth every 7 days for 5 doses      Facility-Administered Medications: None       Past Medical History:   Diagnosis Date    Hyperlipidemia     Pneumonia due to COVID-19 virus 01/13/2022       History reviewed. No pertinent surgical history.    History reviewed. No pertinent family history.  I have reviewed and agree with the history as documented.    E-Cigarette/Vaping     E-Cigarette Use Current Every Day User      E-Cigarette/Vaping Substances     Social History     Tobacco Use    Smoking status: Former     Types: Cigarettes     Passive exposure: Past    Smokeless tobacco: Never   Vaping Use    Vaping status: Every Day   Substance Use Topics    Alcohol use: Never     Alcohol/week: 1.0 standard drink of alcohol     Types: 1 Cans of beer per week    Drug use: No        Review of Systems   Constitutional:  Negative for chills and fever.   HENT:  Negative for congestion, ear pain and sore throat.    Eyes:  Negative for pain and visual disturbance.   Respiratory:  Negative for cough and shortness of breath.    Cardiovascular:  Negative for chest pain and palpitations.   Gastrointestinal:  Negative for abdominal pain, constipation, diarrhea, nausea and vomiting.   Genitourinary:  Negative for dysuria and hematuria.   Musculoskeletal:  Negative for arthralgias, back pain, myalgias and neck pain.        Left shoulder pain   Skin:  Positive for wound. Negative for color change and rash.   Neurological:  Positive for headaches. Negative for dizziness, seizures, syncope, weakness and light-headedness.   Psychiatric/Behavioral:  Negative for agitation and behavioral problems.    All other systems reviewed and are negative.      Physical Exam  ED Triage Vitals [01/17/24 0657]   Temp Pulse Respirations Blood Pressure SpO2   -- 73 18 141/73 96 %      Temp src Heart Rate Source Patient Position - Orthostatic VS BP Location FiO2 (%)   -- Monitor Lying Right arm --      Pain Score       9             Orthostatic Vital Signs  Vitals:    01/17/24 0657   BP: 141/73   Pulse: 73   Patient Position - Orthostatic VS: Lying       Physical Exam  Vitals and nursing note reviewed.   Constitutional:       General: He is not in acute distress.     Appearance: He is well-developed. He is not ill-appearing.   HENT:      Head: Normocephalic and atraumatic.      Right Ear: Tympanic membrane, ear canal and external  ear normal.      Left Ear: Tympanic membrane, ear canal and external ear normal.      Nose: Nose normal.      Mouth/Throat:      Mouth: Mucous membranes are moist.   Eyes:      Extraocular Movements: Extraocular movements intact.      Conjunctiva/sclera: Conjunctivae normal.      Comments: Laceration noted in picture below spontaneously stopped bleeding   Cardiovascular:      Rate and Rhythm: Normal rate and regular rhythm.      Pulses: Normal pulses.      Heart sounds: No murmur heard.  Pulmonary:      Effort: Pulmonary effort is normal. No respiratory distress.      Breath sounds: Normal breath sounds.   Abdominal:      General: Abdomen is flat.      Palpations: Abdomen is soft.      Tenderness: There is no abdominal tenderness.   Musculoskeletal:         General: No swelling.      Right shoulder: Normal.      Left shoulder: Bony tenderness present. Decreased range of motion.      Right upper arm: Normal.      Left upper arm: Normal.      Right elbow: Normal.      Left elbow: Normal.      Right forearm: Normal.      Left forearm: Normal.      Right wrist: Normal.      Left wrist: Normal.      Right hand: Normal.      Left hand: Normal.      Cervical back: Normal, normal range of motion and neck supple. No rigidity.      Thoracic back: Spasms present.      Lumbar back: Spasms present. No tenderness.      Right hip: Normal.      Left hip: Normal.      Right upper leg: Normal.      Left upper leg: Normal.      Left knee: Normal.      Right lower leg: Normal.      Right ankle: Normal.      Left ankle: Normal.      Right foot: Normal.      Left foot: Normal.      Comments: Paraspinal hypertonicity and spasm. No CT or L spine tenderness    Right knee abrasion noted  Left shin abrasion noted   Lymphadenopathy:      Cervical: No cervical adenopathy.   Skin:     General: Skin is warm and dry.      Capillary Refill: Capillary refill takes less than 2 seconds.   Neurological:      General: No focal deficit present.       Mental Status: He is alert and oriented to person, place, and time.   Psychiatric:         Mood and Affect: Mood normal.         Behavior: Behavior normal.        Media Information      Document Information    Clinical Image - Mobile Device      01/17/2024 06:55   Attached To:   Hospital Encounter on 1/17/24   Source Information    Annette Maria Palladino, DO  [facility]      Media Information      Document Information    Clinical Image - Mobile Device      01/17/2024 06:57   Attached To:   Hospital Encounter on 1/17/24   Source Information    Annette Maria Palladino, DO  [facility]       ED Medications  Medications   lidocaine (LIDODERM) 5 % patch 1 patch (1 patch Topical Medication Applied 1/17/24 0706)   ketorolac (FOR EMS ONLY) (TORADOL) injection 30 mg (0 mg Does not apply Given to EMS 1/17/24 0702)   cyclobenzaprine (FLEXERIL) tablet 10 mg (10 mg Oral Given 1/17/24 0705)   acetaminophen (TYLENOL) tablet 975 mg (975 mg Oral Given 1/17/24 0706)       Diagnostic Studies  Results Reviewed       None                   CT head without contrast   Final Result by Shahid Mcadams MD (01/17 0836)      No acute intracranial abnormality.                  Workstation performed: [id number]         XR shoulder 2+ views LEFT   ED Interpretation by Annette Maria Palladino, DO (01/17 9201)   No acute fractures or dislocations noted            Procedures  Universal Protocol:  Consent: Verbal consent obtained.  Consent given by: patient  Patient identity confirmed: verbally with patient  Laceration repair    Date/Time: 1/17/2024 7:12 AM    Performed by: Annette Maria Palladino, DO  Authorized by: Annette Maria Palladino, DO  Body area: head/neck  Location details: left eyelid  Laceration length: 1 cm  Tendon involvement: none  Nerve involvement: none  Vascular damage: no    Sedation:  Patient sedated: no      Wound Dehiscence:  Superficial Wound Dehiscence: simple closure      Procedure Details:  Irrigation solution: saline  Skin  closure: glue  Patient tolerance: patient tolerated the procedure well with no immediate complications            ED Course  ED Course as of 01/17/24 0849   Wed Jan 17, 2024   0715 Blood Pressure: 141/73   0715 Pulse: 73   0715 Respirations: 18   0715 SpO2: 96 %  - C spine cleared via NEXUS  - Mechanical fall, no thinners or ASA despite chart   -GCS 15  -Nasally moving all extremities  -Patient with paraspinal hypertonicity consistent with spasm  -Multimodal pain regimen consisting of lidocaine Flexeril Tylenol patient received Toradol via EMS  -CT of the head performed evaluate for intracranial process  -X-ray of the shoulder to evaluate for fractures or dislocations however low suspicion.  -Laceration repaired using glue given that it was already scabbed and it stopped bleeding and it was so superficial I did not think that it would benefit from sutures I discussed with patient about this which he agreed  -Will frequently reevaluate.   0840 No acute intracranial abnormality.   0849 Pt re-examined and evaluated after testing and treatment. Spoke with the patient and feeling improved. Will discharge home with close f/u with pcp and instructed to return to the ED if sxs worsen or continue. Pt agrees with the plan for discharge and feels comfortable to go home with proper f/u. Advised to return for worsening or additional problems. Diagnostic tests were reviewed and questions answered. Diagnosis, care plan and treatment options were discussed. The patient understand instructions and will follow up as directed.    Counseling:there was a  detailed discussion with the patient and/or guardian regarding: the historical points, exam findings, and any diagnostic results supporting the discharge diagnosis, lab results, radiology results, discharge instructions reviewed with patient and/or family/caregiver and understanding was verbalized. Instructions given to return to the emergency department if symptoms worsen or persist,  or if there are any questions or concerns that arise at home.     All labs reviewed and utilized in the medical decision making process    All radiology studies independently viewed by me and interpreted by the radiologist.                                           Medical Decision Making  Amount and/or Complexity of Data Reviewed  Radiology: ordered and independent interpretation performed.    Risk  OTC drugs.  Prescription drug management.          Disposition  Final diagnoses:   Fall from standing   Left shoulder pain   Muscle spasm   Left eyelid laceration, initial encounter     Time reflects when diagnosis was documented in both MDM as applicable and the Disposition within this note       Time User Action Codes Description Comment    1/17/2024  7:04 AM Palladino, Annette Add [W19.XXXA] Fall from standing     1/17/2024  7:04 AM Palladino, Annette Add [M25.512] Left shoulder pain     1/17/2024  7:04 AM Palladino, Annette Add [M62.838] Muscle spasm     1/17/2024  7:14 AM Palladino, Annette Add [S01.112A] Left eyelid laceration, initial encounter           ED Disposition       ED Disposition   Discharge    Condition   Stable    Date/Time   Wed Jan 17, 2024  7:04 AM    Comment   Perico Roy discharge to home/self care.                   Follow-up Information       Follow up With Specialties Details Why Contact Info Additional Information    UBALDO Glass Nurse Practitioner, Family Medicine Schedule an appointment as soon as possible for a visit  for follow up [address] [place] [id number] [phone]       [facility] Emergency Medicine Go to  As needed, If symptoms worsen [address] [facility] [id number] [phone] [facility], [address], [place], [address]            Patient's Medications   Discharge Prescriptions    ACETAMINOPHEN (TYLENOL) 500 MG TABLET    Take 2 tablets  (1,000 mg total) by mouth every 6 (six) hours as needed for mild pain for up to 5 days       Start Date: 1/17/2024 End Date: 1/22/2024       Order Dose: 1,000 mg       Quantity: 20 tablet    Refills: 0    CYCLOBENZAPRINE (FLEXERIL) 10 MG TABLET    Take 1 tablet (10 mg total) by mouth 2 (two) times a day as needed for muscle spasms       Start Date: 1/17/2024 End Date: --       Order Dose: 10 mg       Quantity: 20 tablet    Refills: 0    LIDOCAINE (LIDODERM) 5 %    Apply 1 patch topically over 12 hours daily for 7 days Remove & Discard patch within 12 hours or as directed by MD       Start Date: 1/17/2024 End Date: 1/24/2024       Order Dose: 1 patch       Quantity: 7 patch    Refills: 0     No discharge procedures on file.    PDMP Review         Value Time User    PDMP Reviewed  Yes 9/17/2021 10:28 AM Kye Conde PA-C             ED Provider  Attending physically available and evaluated ePrico Roy. I managed the patient along with the ED Attending.    Electronically Signed by           Annette Maria Palladino, DO  01/17/24 0849

## 2024-01-24 NOTE — PROGRESS NOTES
Name: Perico Roy      : 1965      MRN: [id number]  Encounter Provider: UBALDO Glass  Encounter Date: 2024   Encounter department: [facility]    Assessment & Plan     1. Chronic pain of both knees  Assessment & Plan:  Previous XRs normal.  -Use Tylenol XR prn + Voltaren gel  -May use voltaren gel daily if prn is ineffective.   - naproxen when tylenol & voltaren gel do not help.   -Encouraged Heat/Ice application  -Discussed regular exercise for skeletal strengthening.  -Encouraged using a cane on stronger side to take stress off joints  -Encouraged using a tibiofemoral knee brace.      Orders:  -     Ambulatory Referral to Physical Therapy; Future  -     naproxen sodium (ALEVE) 220 MG tablet; Take 1 tablet (220 mg total) by mouth every 12 (twelve) hours as needed for mild pain  -     XR knee 3 vw left non injury; Future; Expected date: 2024  -     XR knee 3 vw right non injury; Future; Expected date: 2024    2. Primary hypertension  Assessment & Plan:  BP Readings from Last 3 Encounters:   24 144/82   24 138/77   23 138/76     - off amlodipine for several years.  - recommend eating a low salt diet (such as the DASH diet), limiting alcohol, exercising, and wt loss.  - Reassess in 6 weeks. May need to start antihtin medication again.         3. Erectile dysfunction, unspecified erectile dysfunction type  Assessment & Plan:  Takes two 5 mg Cialis pills which helps more than taking one 10 mg pill.     Continue Cialis 10 mg prn. Advised pt that uncontrolled thyroid is likely also contributing    Orders:  -     tadalafil (CIALIS) 5 MG tablet; Take 2 tablets (10 mg total) by mouth daily as needed for erectile dysfunction    4. Mixed hyperlipidemia  Assessment & Plan:  Lab Results   Component Value Date    CHOLESTEROL 220 (H) 2023    CHOLESTEROL 206 (H) 2022    CHOLESTEROL 218 (H) 2021     Lab Results    Component Value Date    HDL 45 07/17/2023    HDL 43 11/02/2022    HDL 40 02/25/2021     Lab Results   Component Value Date    TRIG 140 07/17/2023    TRIG 64 11/02/2022    TRIG 98 02/25/2021     Lab Results   Component Value Date    NONHDLC 178 02/25/2021    NONHDLC 176 06/25/2020     Lab Results   Component Value Date    LDLCALC 147 (H) 07/17/2023     Reports that he stopped lipitor because he has been implementing lifestyle changes since a few weeks ago. We will check lipid panel again in 6 months. If levels remain elevated, will discuss restarting lipitor with pt given ascvd risk score. The 10-year ASCVD risk score (Esperanza ALDRIDGE, et al., 2019) is: 9.8%    Values used to calculate the score:      Age: 58 years      Sex: Male      Is Non- : No      Diabetic: No      Tobacco smoker: No      Systolic Blood Pressure: 144 mmHg      Is BP treated: No      HDL Cholesterol: 45 mg/dL      Total Cholesterol: 220 mg/dL        5. Elevated TSH  Assessment & Plan:  Stopped thyroid medication a few weeks ago because he does not like taking medicine. Discussed complication of uncontrolled hypothyroidism. I recommend he obtain a more recent thyroid level as synthroid 50 mcg may not be enough. He is agreeable to restarting thyroid medicine though.   Lab Results   Component Value Date    TBL5DZGTVCOS 5.714 (H) 07/17/2023           6. Chronic bilateral low back pain with bilateral sciatica  Assessment & Plan:  Continue lidocaine patches    Orders:  -     Ambulatory Referral to Physical Therapy; Future  -     naproxen sodium (ALEVE) 220 MG tablet; Take 1 tablet (220 mg total) by mouth every 12 (twelve) hours as needed for mild pain      BMI Counseling: Body mass index is 32.89 kg/m². The BMI is above normal. Nutrition recommendations include decreasing portion sizes, encouraging healthy choices of fruits and vegetables, decreasing fast food intake, consuming healthier snacks, limiting drinks that contain sugar,  moderation in carbohydrate intake, increasing intake of lean protein, reducing intake of saturated and trans fat and reducing intake of cholesterol. Exercise recommendations include moderate physical activity 150 minutes/week. No pharmacotherapy was ordered. Rationale for BMI follow-up plan is due to patient being overweight or obese.         Subjective      Perico Roy is a 58 y.o. male  has a past medical history of Hyperlipidemia and Pneumonia due to COVID-19 virus.  has no past surgical history on file.    He presents today for follow-up.    Knee Pain  Patient presents with knee pain involving both knees. Onset of the symptoms was several weeks ago. Inciting event: none known. Current symptoms include crepitus sensation, stiffness, and swelling. Pain is aggravated by going up and down stairs. Patient has had prior knee problems. Evaluation to date: plain films: normal. Treatment to date: tylenol which has not been effective. Naproxen helps he has been taking it prn. He also has worsening chronic LBP. He is using lidocaine patches which is providing some relief. He reports that he tried tramadol in the past but does not wish to try any medication with potential for addiction because in the past, he found himself requesting a refill even when he was not having pain.         Review of Systems   Constitutional:  Negative for chills and fever.   HENT:  Negative for ear pain and sore throat.    Eyes:  Negative for pain and visual disturbance.   Respiratory:  Negative for cough and shortness of breath.    Cardiovascular:  Negative for chest pain and palpitations.   Gastrointestinal:  Negative for abdominal pain and vomiting.   Genitourinary:  Negative for dysuria and hematuria.   Musculoskeletal:  Positive for arthralgias and myalgias. Negative for back pain.   Skin:  Negative for color change and rash.   Neurological:  Negative for seizures and syncope.   All other systems reviewed and are  "negative.      Current Outpatient Medications on File Prior to Visit   Medication Sig    aspirin (ECOTRIN LOW STRENGTH) 81 mg EC tablet Take 81 mg by mouth daily    cyclobenzaprine (FLEXERIL) 10 mg tablet Take 1 tablet (10 mg total) by mouth 2 (two) times a day as needed for muscle spasms    Diclofenac Sodium (VOLTAREN) 1 % Apply 2 g topically 4 (four) times a day as needed (knee pain)    levothyroxine (Synthroid) 50 mcg tablet Take 1 tablet (50 mcg total) by mouth daily    lidocaine (Lidoderm) 5 % Apply 1 patch topically over 12 hours daily for 7 days Remove & Discard patch within 12 hours or as directed by MD    vitamin B-12 (CYANOCOBALAMIN) 500 MCG TABS Take 2 tablets (1,000 mcg total) by mouth every 7 days for 5 doses    [DISCONTINUED] atorvastatin (LIPITOR) 40 mg tablet Take 1 tablet (40 mg total) by mouth every evening    [DISCONTINUED] tadalafil (CIALIS) 5 MG tablet Take 1 tablet (5 mg total) by mouth daily as needed for erectile dysfunction    [DISCONTINUED] traZODone (DESYREL) 50 mg tablet Take 1 tablet (50 mg total) by mouth daily at bedtime       Objective     /82 (BP Location: Left arm, Patient Position: Sitting, Cuff Size: Large)   Pulse 75   Temp 99 °F (37.2 °C) (Temporal)   Resp 16   Ht 5' 7\" (1.702 m)   Wt 95.3 kg (210 lb)   SpO2 96%   BMI 32.89 kg/m²     Physical Exam  Vitals and nursing note reviewed.   Constitutional:       General: He is not in acute distress.     Appearance: He is obese. He is not ill-appearing.   HENT:      Head: Normocephalic and atraumatic.      Right Ear: External ear normal. There is no impacted cerumen.      Left Ear: External ear normal. There is no impacted cerumen.      Nose: Nose normal. No congestion.   Eyes:      Pupils: Pupils are equal, round, and reactive to light.   Cardiovascular:      Rate and Rhythm: Normal rate and regular rhythm.      Pulses: Normal pulses.      Heart sounds: Normal heart sounds. No murmur heard.  Pulmonary:      Effort: " Pulmonary effort is normal.      Breath sounds: Normal breath sounds.   Abdominal:      General: Bowel sounds are normal.      Palpations: Abdomen is soft.      Tenderness: There is no abdominal tenderness.   Musculoskeletal:         General: No tenderness.      Cervical back: Normal and normal range of motion. No tenderness.      Thoracic back: Normal.      Lumbar back: Positive right straight leg raise test and positive left straight leg raise test.      Right knee: Crepitus present.      Left knee: Crepitus present.      Comments: Bilateral knee pain aggravated with squatting   Skin:     General: Skin is warm and dry.   Neurological:      General: No focal deficit present.      Mental Status: He is alert and oriented to person, place, and time. Mental status is at baseline.   Psychiatric:         Mood and Affect: Mood normal.         Behavior: Behavior normal.         Thought Content: Thought content normal.         Judgment: Judgment normal.       UBALDO Glass

## 2024-01-25 ENCOUNTER — OFFICE VISIT (OUTPATIENT)
Dept: FAMILY MEDICINE CLINIC | Facility: CLINIC | Age: 59
End: 2024-01-25

## 2024-01-25 VITALS
WEIGHT: 210 LBS | OXYGEN SATURATION: 96 % | TEMPERATURE: 99 F | HEIGHT: 67 IN | HEART RATE: 75 BPM | RESPIRATION RATE: 16 BRPM | DIASTOLIC BLOOD PRESSURE: 82 MMHG | SYSTOLIC BLOOD PRESSURE: 144 MMHG | BODY MASS INDEX: 32.96 KG/M2

## 2024-01-25 DIAGNOSIS — M25.561 CHRONIC PAIN OF BOTH KNEES: Primary | ICD-10-CM

## 2024-01-25 DIAGNOSIS — I10 PRIMARY HYPERTENSION: ICD-10-CM

## 2024-01-25 DIAGNOSIS — G89.29 CHRONIC PAIN OF BOTH KNEES: Primary | ICD-10-CM

## 2024-01-25 DIAGNOSIS — G89.29 CHRONIC BILATERAL LOW BACK PAIN WITH BILATERAL SCIATICA: ICD-10-CM

## 2024-01-25 DIAGNOSIS — R79.89 ELEVATED TSH: ICD-10-CM

## 2024-01-25 DIAGNOSIS — N52.9 ERECTILE DYSFUNCTION, UNSPECIFIED ERECTILE DYSFUNCTION TYPE: ICD-10-CM

## 2024-01-25 DIAGNOSIS — M54.41 CHRONIC BILATERAL LOW BACK PAIN WITH BILATERAL SCIATICA: ICD-10-CM

## 2024-01-25 DIAGNOSIS — E78.2 MIXED HYPERLIPIDEMIA: ICD-10-CM

## 2024-01-25 DIAGNOSIS — M25.562 CHRONIC PAIN OF BOTH KNEES: Primary | ICD-10-CM

## 2024-01-25 DIAGNOSIS — M54.42 CHRONIC BILATERAL LOW BACK PAIN WITH BILATERAL SCIATICA: ICD-10-CM

## 2024-01-25 PROCEDURE — 99214 OFFICE O/P EST MOD 30 MIN: CPT

## 2024-01-25 RX ORDER — TADALAFIL 5 MG/1
10 TABLET ORAL DAILY PRN
Qty: 30 TABLET | Refills: 0 | Status: SHIPPED | OUTPATIENT
Start: 2024-01-25

## 2024-01-25 RX ORDER — NAPROXEN SODIUM 220 MG
220 TABLET ORAL EVERY 12 HOURS PRN
Qty: 60 TABLET | Refills: 0 | Status: SHIPPED | OUTPATIENT
Start: 2024-01-25

## 2024-01-25 RX ORDER — TADALAFIL 10 MG/1
10 TABLET ORAL DAILY PRN
Qty: 30 TABLET | Refills: 0 | Status: SHIPPED | OUTPATIENT
Start: 2024-01-25 | End: 2024-01-25

## 2024-01-25 NOTE — ASSESSMENT & PLAN NOTE
Previous XRs normal.  -Use Tylenol XR prn + Voltaren gel  -May use voltaren gel daily if prn is ineffective.   - naproxen when tylenol & voltaren gel do not help.   -Encouraged Heat/Ice application  -Discussed regular exercise for skeletal strengthening.  -Encouraged using a cane on stronger side to take stress off joints  -Encouraged using a tibiofemoral knee brace.

## 2024-01-25 NOTE — ASSESSMENT & PLAN NOTE
Lab Results   Component Value Date    CHOLESTEROL 220 (H) 07/17/2023    CHOLESTEROL 206 (H) 11/02/2022    CHOLESTEROL 218 (H) 02/25/2021     Lab Results   Component Value Date    HDL 45 07/17/2023    HDL 43 11/02/2022    HDL 40 02/25/2021     Lab Results   Component Value Date    TRIG 140 07/17/2023    TRIG 64 11/02/2022    TRIG 98 02/25/2021     Lab Results   Component Value Date    NONHDLC 178 02/25/2021    NONHDLC 176 06/25/2020     Lab Results   Component Value Date    LDLCALC 147 (H) 07/17/2023     Reports that he stopped lipitor because he has been implementing lifestyle changes since a few weeks ago. We will check lipid panel again in 6 months. If levels remain elevated, will discuss restarting lipitor with pt given ascvd risk score. The 10-year ASCVD risk score (Esperanza ALDRIDGE, et al., 2019) is: 9.8%    Values used to calculate the score:      Age: 58 years      Sex: Male      Is Non- : No      Diabetic: No      Tobacco smoker: No      Systolic Blood Pressure: 144 mmHg      Is BP treated: No      HDL Cholesterol: 45 mg/dL      Total Cholesterol: 220 mg/dL

## 2024-01-25 NOTE — ASSESSMENT & PLAN NOTE
Takes two 5 mg Cialis pills which helps more than taking one 10 mg pill.     Continue Cialis 10 mg prn. Advised pt that uncontrolled thyroid is likely also contributing

## 2024-01-25 NOTE — ASSESSMENT & PLAN NOTE
BP Readings from Last 3 Encounters:   01/25/24 144/82   01/17/24 138/77   07/17/23 138/76     - off amlodipine for several years.  - recommend eating a low salt diet (such as the DASH diet), limiting alcohol, exercising, and wt loss.  - Reassess in 6 weeks. May need to start antihtin medication again.

## 2024-01-25 NOTE — ASSESSMENT & PLAN NOTE
Stopped thyroid medication a few weeks ago because he does not like taking medicine. Discussed complication of uncontrolled hypothyroidism. I recommend he obtain a more recent thyroid level as synthroid 50 mcg may not be enough. He is agreeable to restarting thyroid medicine though.   Lab Results   Component Value Date    [id number] 5.714 (H) 07/17/2023

## 2024-02-06 ENCOUNTER — EVALUATION (OUTPATIENT)
Dept: PHYSICAL THERAPY | Facility: CLINIC | Age: 59
End: 2024-02-06
Payer: MEDICARE

## 2024-02-06 DIAGNOSIS — G89.29 CHRONIC PAIN OF BOTH KNEES: Primary | ICD-10-CM

## 2024-02-06 DIAGNOSIS — M25.561 CHRONIC PAIN OF BOTH KNEES: Primary | ICD-10-CM

## 2024-02-06 DIAGNOSIS — G89.29 CHRONIC BILATERAL LOW BACK PAIN WITH BILATERAL SCIATICA: ICD-10-CM

## 2024-02-06 DIAGNOSIS — M54.41 CHRONIC BILATERAL LOW BACK PAIN WITH BILATERAL SCIATICA: ICD-10-CM

## 2024-02-06 DIAGNOSIS — M25.562 CHRONIC PAIN OF BOTH KNEES: Primary | ICD-10-CM

## 2024-02-06 DIAGNOSIS — M54.42 CHRONIC BILATERAL LOW BACK PAIN WITH BILATERAL SCIATICA: ICD-10-CM

## 2024-02-06 PROCEDURE — 97162 PT EVAL MOD COMPLEX 30 MIN: CPT

## 2024-02-06 PROCEDURE — 97112 NEUROMUSCULAR REEDUCATION: CPT

## 2024-02-06 PROCEDURE — 97110 THERAPEUTIC EXERCISES: CPT

## 2024-02-06 NOTE — PROGRESS NOTES
PT Evaluation     Today's date: 2024  Patient name: Perico Roy  : 1965  MRN: [id number]  Referring provider: Jesi Gonzalez, *  Dx:   Encounter Diagnosis     ICD-10-CM    1. Chronic pain of both knees  M25.561 Ambulatory Referral to Physical Therapy    M25.562     G89.29       2. Chronic bilateral low back pain with bilateral sciatica  M54.42 Ambulatory Referral to Physical Therapy    M54.41     G89.29           Start Time: 07  Stop Time: 0830  Total time in clinic (min): 55 minutes    Assessment  Assessment details: Pt is a 58 y.o. year old male presenting to physical therapy for chronic pain of both knees  and chronic bilateral low back pain with bilateral sciatica. He presents with the following impairments: hypomobility of lumbar spine, decreased lumbar ROM in all directions, decreased hip and knee muscular strength and endurance bilaterally, increased tension in lumbar paraspinals, hamstrings, ITB, hip flexor, and gastroc-soleus complex, fair TA contraction but poor endurance of musculature, improper firing of multifidi, increased neural tension in bilateral sciatic nerves, gait dysfunction, and poor squatting mechanics affecting his function with squatting, lifting, bending his knee, prolonged walking, twisting, and other ADLs.  Pt will benefit from skilled physical therapy to address functional limitations noted in evaluation and meet patient goals.   Impairments: abnormal muscle firing, abnormal or restricted ROM, activity intolerance, impaired physical strength, lacks appropriate home exercise program, pain with function and poor body mechanics    Symptom irritability: moderateUnderstanding of Dx/Px/POC: good   Prognosis: good    Goals  ST. Pt will be independent with HEP.  2. Pt will improve lumbar mobility deficits by 50%   3. Pt will improve FOTO score from baseline set during initial evaluation  LT. Pt will be able to squat 10 times with proper form and no increase  "in symptoms  2. Pt will improve bilateral knee strength grossly by 2 grades or more  3. Pt will be able to navigate 3 flights of stairs without increase in symptoms  4. Pt will reach FOTO goal set by roro during initial evaluation     Plan  Patient would benefit from: PT eval and skilled physical therapy  Planned modality interventions: biofeedback, manual electrical stimulation, microcurrent electrical stimulation, TENS, electrical stimulation/Russian stimulation, thermotherapy: hydrocollator packs, cryotherapy and unattended electrical stimulation  Planned therapy interventions: abdominal trunk stabilization, joint mobilization, manual therapy, massage, ADL retraining, neuromuscular re-education, body mechanics training, patient education, postural training, strengthening, stretching, therapeutic activities, therapeutic exercise, flexibility, functional ROM exercises and home exercise program  Frequency: 2x week  Duration in weeks: 6  Treatment plan discussed with: patient    Subjective Evaluation    History of Present Illness  Mechanism of injury: Pt is a 58 y.o. presenting with chronic bilateral knee pain and low back pain. Pt reports that this knee pain has been present for several months now, with no MARCO and his back pain started 20+ years ago while he was swinging a sledge hammer. Pt noted their pain is located all around his knees bilaterally, with no specific area he can' point to that is more painful then the others. HE also noted \"the MD said I have arthritis\". Pt reports pain bilaterally across his low back. Pt reports that squatting (especially rising from squat), bending his knee (causes pressure), lifting, twisting, and prolonged walking is what causes them the most pain and are the most difficult movements for them to perfrom. Pt reports that medication helps relieve the pain. Pt stated that their main goals for therapy are pain reduction and improved function with difficult and painful movements " listed above for better performance on ADLs.   Quality of life: good    Patient Goals  Patient goals for therapy: decreased edema, decreased pain, improved balance, increased motion, increased strength and independence with ADLs/IADLs    Pain  Current pain ratin  At best pain rating: 3  At worst pain ratin      Objective     Active Range of Motion     Lumbar   Flexion:  with pain Restriction level: maximal  Extension:  with pain Restriction level: maximal  Left lateral flexion:  with pain Restriction level: maximal  Right lateral flexion:  with pain Restriction level: moderate  Left rotation:  with pain Restriction level: moderate  Right rotation:  with pain Restriction level: moderate  Left Knee   Flexion: WFL and with pain  Extension: WFL    Right Knee   Flexion: WFL and with pain  Extension: WFL    Joint Play     Hypomobile: T11, T12, L1, L2, L3, L4, L5 and S1     Pain: T11, T12, L1, L2, L3, L4, L5 and S1     Strength/Myotome Testing     Left Hip   Planes of Motion   Flexion: 3+  Extension: 3  Abduction: 4-  External rotation: 3+  Internal rotation: 4-    Right Hip   Planes of Motion   Flexion: 3+  Extension: 3  Abduction: 3+  External rotation: 3+  Internal rotation: 4    Left Knee   Flexion: 4-  Extension: 4-    Right Knee   Flexion: 4-  Extension: 4-    Left Ankle/Foot   Dorsiflexion: 5  Plantar flexion: 5  Inversion: 5  Eversion: 5    Right Ankle/Foot   Dorsiflexion: 5  Plantar flexion: 5  Inversion: 5  Eversion: 5    Tests     Lumbar     Left   Positive passive SLR and slump test.     Right   Positive passive SLR and slump test.     Left Pelvic Girdle/Sacrum   Positive: thigh thrust.     Right Pelvic Girdle/Sacrum   Positive: thigh thrust.     Left Hip   Positive VAMSI and FADIR.     Right Hip   Positive VAMSI and FADIR.     Left Knee   Negative anterior drawer, anterior Lachman, lateral Daxa, medial Daxa, posterior drawer, valgus stress test at 0 degrees and varus stress test at 0 degrees.  "    Right Knee   Negative anterior drawer, anterior Lachman, lateral Daxa, medial Daxa, posterior drawer, valgus stress test at 0 degrees and varus stress test at 0 degrees.     General Comments:      Lumbar Comments  Gait: Slowed gait speed, increased pain in b/l knees after 4 laps in eval room, excessive weight shift  Squat: weight forward, poor depth, pain, heel leave round, shoulders facing ground.   Increased tension in hamstring, gastroc-soleus complex, hip flexors, ITB, and piriformis  Increased tension in lumbar paraspinals  Fair TA contraction, poor endurance of musculature  Improper firing of multifidi      Flowsheet Rows      Flowsheet Row Most Recent Value   PT/OT G-Codes    Current Score 44   Projected Score 57               Precautions: Turkish Speaking      Date 2/6            Visit # IE            FOTO IE             Re-eval IE               Manuals 2/6            Lumbar STM             Lumbar Gapping             Quad/ITB STM                          Neuro Re-Ed 2/6            TA Cracing             Hamstring Str 2x30\"            Seated Calf Str             ITB Str             Bridges 15x GTB            Clamshells             Sciatic Nerve Glides 20x ea            Slantboard             Deadbugs             Birddogs             3-Way SB Rollout             Foam Roller Protocol             SLS             Ther Ex 2/6            LTR 5x5\" ea            SLR 10x ea            SL Hip Abd             Leg Press             SL Leg Press             Standing Ball Crush March             Pallof Press             Side Stepping             Monster Walks             3-way Hip kicks             Split Squats             Ther Activity 2/6            Squatting             Step-Ups             Gait Training 2/6                                      Modalities 2/6                                              "

## 2024-02-14 ENCOUNTER — OFFICE VISIT (OUTPATIENT)
Dept: PHYSICAL THERAPY | Facility: CLINIC | Age: 59
End: 2024-02-14
Payer: MEDICARE

## 2024-02-14 DIAGNOSIS — M54.41 CHRONIC BILATERAL LOW BACK PAIN WITH BILATERAL SCIATICA: ICD-10-CM

## 2024-02-14 DIAGNOSIS — G89.29 CHRONIC PAIN OF BOTH KNEES: Primary | ICD-10-CM

## 2024-02-14 DIAGNOSIS — M25.561 CHRONIC PAIN OF BOTH KNEES: Primary | ICD-10-CM

## 2024-02-14 DIAGNOSIS — M54.42 CHRONIC BILATERAL LOW BACK PAIN WITH BILATERAL SCIATICA: ICD-10-CM

## 2024-02-14 DIAGNOSIS — M25.562 CHRONIC PAIN OF BOTH KNEES: Primary | ICD-10-CM

## 2024-02-14 DIAGNOSIS — G89.29 CHRONIC BILATERAL LOW BACK PAIN WITH BILATERAL SCIATICA: ICD-10-CM

## 2024-02-14 PROCEDURE — 97010 HOT OR COLD PACKS THERAPY: CPT

## 2024-02-14 PROCEDURE — 97112 NEUROMUSCULAR REEDUCATION: CPT

## 2024-02-14 PROCEDURE — 97140 MANUAL THERAPY 1/> REGIONS: CPT

## 2024-02-14 PROCEDURE — 97110 THERAPEUTIC EXERCISES: CPT

## 2024-02-14 NOTE — PROGRESS NOTES
"Daily Note     Today's date: 2024  Patient name: Perico Roy  : 1965  MRN: [id number]  Referring provider: Jesi Gonzalez, *  Dx:   Encounter Diagnosis     ICD-10-CM    1. Chronic pain of both knees  M25.561     M25.562     G89.29       2. Chronic bilateral low back pain with bilateral sciatica  M54.42     M54.41     G89.29           Start Time: 1645  Stop Time: 1733  Total time in clinic (min): 48 minutes    Subjective: Pt reports that he is having a lot of pain in both his knee and back coming into today's therapy session.       Objective: See treatment diary below      Assessment: Pt tolerated treatment well. Pt responded well to STM and mobilizations performed to his low back with both decreased tension in associated musculature and improved ROM post manual interventions. Added 3-way ball rollout improve spinal mobility on all levels of reduce tension in paraspinal musculature. Attempted to perform LE stretching during session but pt experienced  increased pain so exercises was stopped. Added TA bracing and standing ball crush march to improve activation and endurance of core musculature. Utilized MHP at end of therapy session to reduce tension in musculature and decrease symptom intensity, which pt responded well to Patient exhibited good technique with therapeutic exercises and would benefit from continued PT      Plan: Continue per plan of care.  Progress treatment as tolerated.       Precautions: Marshallese Speaking      Date            Visit # IE 2           FOTO IE             Re-eval IE               Manuals            Lumbar STM  PWK           Lumbar Gapping  PWK           Quad/ITB STM                          Neuro Re-Ed            TA Bracing  10x5\"           Hamstring Str 2x30\"            Seated Calf Str             ITB Str             Bridges 15x GTB            Clamshells             Sciatic Nerve Glides 20x ea            Slantboard             Deadbugs  " "           Birddogs             3-Way SB Rollout  10x10\" ea           Foam Roller Protocol             SLS             Ther Ex 2/6 2/14           Bike  6'           LTR 5x5\" ea 10x5\"           SLR 10x ea            SL Hip Abd             Leg Press             SL Leg Press             Standing Ball Crush March 30x ea           Pallof Press             Side Stepping             Monster Walks             3-way Hip kicks             Split Squats             Ther Activity 2/6 2/14           Squatting             Step-Ups             Gait Training 2/6 2/14                                     Modalities 2/6 2/14           MHP  8'                                "

## 2024-02-16 ENCOUNTER — OFFICE VISIT (OUTPATIENT)
Dept: PHYSICAL THERAPY | Facility: CLINIC | Age: 59
End: 2024-02-16
Payer: MEDICARE

## 2024-02-16 DIAGNOSIS — G89.29 CHRONIC PAIN OF BOTH KNEES: Primary | ICD-10-CM

## 2024-02-16 DIAGNOSIS — M54.41 CHRONIC BILATERAL LOW BACK PAIN WITH BILATERAL SCIATICA: ICD-10-CM

## 2024-02-16 DIAGNOSIS — M25.562 CHRONIC PAIN OF BOTH KNEES: Primary | ICD-10-CM

## 2024-02-16 DIAGNOSIS — M25.561 CHRONIC PAIN OF BOTH KNEES: Primary | ICD-10-CM

## 2024-02-16 DIAGNOSIS — G89.29 CHRONIC BILATERAL LOW BACK PAIN WITH BILATERAL SCIATICA: ICD-10-CM

## 2024-02-16 DIAGNOSIS — M54.42 CHRONIC BILATERAL LOW BACK PAIN WITH BILATERAL SCIATICA: ICD-10-CM

## 2024-02-16 PROCEDURE — 97110 THERAPEUTIC EXERCISES: CPT

## 2024-02-16 PROCEDURE — 97140 MANUAL THERAPY 1/> REGIONS: CPT

## 2024-02-16 NOTE — PROGRESS NOTES
"Daily Note     Today's date: 2024  Patient name: Perico Roy  : 1965  MRN: [id number]  Referring provider: Jesi Gonzalez, *  Dx:   Encounter Diagnosis     ICD-10-CM    1. Chronic pain of both knees  M25.561     M25.562     G89.29       2. Chronic bilateral low back pain with bilateral sciatica  M54.42     M54.41     G89.29           Start Time: 07  Stop Time: 0810  Total time in clinic (min): 42 minutes    Subjective: Patient reports symptoms have not been well managed and states a 9/10 pain level pre-tx.      At conclusion of visit patient reported a slight reduction of symptoms compared to arrival.       Objective: See treatment diary below      Assessment: Patient tolerated treatment session fair. Held off on progressing POC this 2* subjective reports. Initiated warm-up on stationary bike which patient had no difficulty with. Noted multiple TPRs across L/s while performing manual work. Patient performed all TE with good technique/form however, noted being challenged by all exercises 2* pain intensity. Ended session with MHP to improve tissue extensibility across lumbar spine and modulate pain symptoms. Patient would benefit from continued stretching, strengthening, and manual techniques to improve level of function.      Plan: Continue per POC. Increase reps/resistance as tolerated.      Precautions: Japanese Speaking      Date           Visit # IE 2 3          FOTO IE             Re-eval IE               Manuals           Lumbar STM  PWK MS          Lumbar Gapping  PWK           Quad/ITB STM                          Neuro Re-Ed           TA Bracing  10x5\" 10x5\"          Hamstring Str 2x30\"            Seated Calf Str             ITB Str             Bridges 15x GTB            Clamshells             Sciatic Nerve Glides 20x ea            Slantboard             Deadbugs             Birddogs             3-Way SB Rollout  10x10\" ea           Foam " "Roller Protocol             SLS             Ther Ex 2/6 2/14 2/16          Bike  6' 6'          LTR 5x5\" ea 10x5\" 10x5\"          SLR 10x ea            SL Hip Abd             Leg Press             SL Leg Press             Standing Ball Crush March 30x ea           Pallof Press             Side Stepping             Monster Walks             3-way Hip kicks             Split Squats             Ther Activity 2/6 2/14 2/16          Squatting             Step-Ups             Gait Training 2/6 2/14 2/16                                    Modalities 2/6 2/14 2/16          MHP  8' 10'                                 "

## 2024-02-20 ENCOUNTER — OFFICE VISIT (OUTPATIENT)
Dept: PHYSICAL THERAPY | Facility: CLINIC | Age: 59
End: 2024-02-20
Payer: MEDICARE

## 2024-02-20 DIAGNOSIS — M25.562 CHRONIC PAIN OF BOTH KNEES: Primary | ICD-10-CM

## 2024-02-20 DIAGNOSIS — M54.42 CHRONIC BILATERAL LOW BACK PAIN WITH BILATERAL SCIATICA: ICD-10-CM

## 2024-02-20 DIAGNOSIS — G89.29 CHRONIC PAIN OF BOTH KNEES: Primary | ICD-10-CM

## 2024-02-20 DIAGNOSIS — M54.41 CHRONIC BILATERAL LOW BACK PAIN WITH BILATERAL SCIATICA: ICD-10-CM

## 2024-02-20 DIAGNOSIS — M25.561 CHRONIC PAIN OF BOTH KNEES: Primary | ICD-10-CM

## 2024-02-20 DIAGNOSIS — G89.29 CHRONIC BILATERAL LOW BACK PAIN WITH BILATERAL SCIATICA: ICD-10-CM

## 2024-02-20 PROCEDURE — 97530 THERAPEUTIC ACTIVITIES: CPT

## 2024-02-20 PROCEDURE — 97112 NEUROMUSCULAR REEDUCATION: CPT

## 2024-02-20 PROCEDURE — 97110 THERAPEUTIC EXERCISES: CPT

## 2024-02-20 NOTE — PROGRESS NOTES
"Daily Note     Today's date: 2024  Patient name: Perico Roy  : 1965  MRN: [id number]  Referring provider: Jesi Gonzalez, *  Dx:   Encounter Diagnosis     ICD-10-CM    1. Chronic pain of both knees  M25.561     M25.562     G89.29       2. Chronic bilateral low back pain with bilateral sciatica  M54.42     M54.41     G89.29           Start Time: 0800  Stop Time: 0840  Total time in clinic (min): 40 minutes    Subjective: Pt reports that his back is feeling much better coming into today's therapy session, and noted that his pain is less consistent \"coming and going\" now. Pt noted that he is still having pain in his left knee coming into session, but not right.       Objective: See treatment diary below      Assessment: Pt tolerated treatment well. Added leg press, both DL and SL, side-stepping, monster walks, step-ups, and squatting to challenge the strength and endurance of LE and posterior chain musculature, and reduce symptom intensity in left knee. Pt was challenged with all of the additional exercises during today's session, but he was able to complete all sets and reps with proper form and appropriate levels of fatigue post-session. Added slantboard to decrease tension in gastroc-soleus complex and hamstring musculature. Patient exhibited good technique with therapeutic exercises and would benefit from continued PT      Plan: Continue per plan of care.  Progress treatment as tolerated.       Precautions: Latvian Speaking      Date          Visit # IE 2 3 4         FOTO IE             Re-eval IE               Manuals          Lumbar STM  PWK MS          Lumbar Gapping  PWK           Quad/ITB STM                          Neuro Re-Ed          TA Bracing  10x5\" 10x5\" 10x5\" ea         Hamstring Str 2x30\"            Seated Calf Str             ITB Str             Bridges 15x GTB            Clamshells             Sciatic Nerve Glides 20x " "ea   20x ea         Slantboard    3x30\"         Deadbugs             Birddogs             3-Way SB Rollout  10x10\" ea  10x10\" ea         Foam Roller Protocol             SLS             Ther Ex 2/6 2/14 2/16 2/20         Bike  6' 6' 6'         LTR 5x5\" ea 10x5\" 10x5\"          SLR 10x ea            SL Hip Abd             Leg Press    3x10 115#         SL Leg Press    3x10 75# ea         Standing Ball Crush March  30x ea           Pallof Press             Side Stepping    5 laps GTB         Monster Walks    5 laps GTB         3-way Hip kicks    15x ea GTB         Split Squats             Ther Activity 2/6 2/14 2/16 2/20         Squatting    20x ea GTB         Step-Ups    20x ea 1R         Gait Training 2/6 2/14 2/16 2/20                                   Modalities 2/6 2/14 2/16 2/20         MHP  8' 10'                                   "

## 2024-02-21 NOTE — PROGRESS NOTES
"Daily Note     Today's date: 2024  Patient name: Perico Roy  : 1965  MRN: [id number]  Referring provider: Jesi Gonzalez, *  Dx:   Encounter Diagnosis     ICD-10-CM    1. Chronic pain of both knees  M25.561     M25.562     G89.29       2. Chronic bilateral low back pain with bilateral sciatica  M54.42     M54.41     G89.29           Start Time: 0755  Stop Time: 0840  Total time in clinic (min): 45 minutes    Subjective: Pt stated that he has a 7/10 pain today in his LLE and L side of lower back today and is still feeling pain from when he fell three weeks ago in his home in his wrist and BLE. Pt stated that he has not been performing his exercises on HEP.        Objective: See treatment diary below      Assessment: Pt tolerated warm up on bike well at beginning of session without increase in discomfort during or after activity. Pt was challenged appropriately with addition of standing core strengthening activities and required mild verbal cueing throughout activities to perform with proper form. Pt had mild discomfort in L forearm with performance of pallof press, modified resistance and educated pt to use RUE more when pressing, which pt tolerated better. Pt had discomfort with LLE single leg press activity with slightly increased resistance today. Pt continues to have difficulty with squat depth due to pain in LLE. Pt would benefit from continued skilled PT to improve BLE strength, core stability, L/S mobility, flexibility, and functional ability.       Plan: Continue per plan of care.      Precautions: Azeri Speaking      Date         Visit # IE 2 3 4 5        FOTO IE             Re-eval IE               Manuals         Lumbar STM  PWK MS          Lumbar Gapping  PWK           Quad/ITB STM                          Neuro Re-Ed         TA Bracing  10x5\" 10x5\" 10x5\" ea         Hamstring Str 2x30\"            Seated Calf " "Str             ITB Str             Bridges 15x GTB            Clamshells             Sciatic Nerve Glides 20x ea   20x ea         Slantboard    3x30\"         Deadbugs             Birddogs             3-Way SB Rollout  10x10\" ea  10x10\" ea 10x10\" ea        Seated T/S ext     10x10\"        Foam Roller Protocol             SLS             Ther Ex 2/6 2/14 2/16 2/20 2/23        Bike  6' 6' 6' 6'        LTR 5x5\" ea 10x5\" 10x5\"          SLR 10x ea            SL Hip Abd             Leg Press    3x10 115# 2x15 135#        SL Leg Press    3x10 75# ea 2x15 85# RLE,         Standing Ball Crush March  30x ea           Pallof Press     15x 7# ea        UTR     15x 7# ea        Side Stepping    5 laps GTB 5 laps GTB        Monster Walks    5 laps GTB 5 laps GTB         3-way Hip kicks    15x ea GTB 20x ea GTB        Split Squats             Ther Activity 2/6 2/14 2/16 2/20 2/23        Squatting    20x ea GTB 2x10        Step-Ups    20x ea 1R 20x ea 1R        Gait Training 2/6 2/14 2/16 2/20                                   Modalities 2/6 2/14 2/16 2/20         MHP  8' 10'                                     "

## 2024-02-23 ENCOUNTER — OFFICE VISIT (OUTPATIENT)
Dept: PHYSICAL THERAPY | Facility: CLINIC | Age: 59
End: 2024-02-23
Payer: MEDICARE

## 2024-02-23 DIAGNOSIS — G89.29 CHRONIC BILATERAL LOW BACK PAIN WITH BILATERAL SCIATICA: ICD-10-CM

## 2024-02-23 DIAGNOSIS — M54.41 CHRONIC BILATERAL LOW BACK PAIN WITH BILATERAL SCIATICA: ICD-10-CM

## 2024-02-23 DIAGNOSIS — M25.562 CHRONIC PAIN OF BOTH KNEES: Primary | ICD-10-CM

## 2024-02-23 DIAGNOSIS — M25.561 CHRONIC PAIN OF BOTH KNEES: Primary | ICD-10-CM

## 2024-02-23 DIAGNOSIS — M54.42 CHRONIC BILATERAL LOW BACK PAIN WITH BILATERAL SCIATICA: ICD-10-CM

## 2024-02-23 DIAGNOSIS — G89.29 CHRONIC PAIN OF BOTH KNEES: Primary | ICD-10-CM

## 2024-02-23 PROCEDURE — 97110 THERAPEUTIC EXERCISES: CPT

## 2024-02-23 PROCEDURE — 97112 NEUROMUSCULAR REEDUCATION: CPT

## 2024-02-27 ENCOUNTER — APPOINTMENT (OUTPATIENT)
Dept: PHYSICAL THERAPY | Facility: CLINIC | Age: 59
End: 2024-02-27
Payer: MEDICARE

## 2024-03-01 ENCOUNTER — OFFICE VISIT (OUTPATIENT)
Dept: PHYSICAL THERAPY | Facility: CLINIC | Age: 59
End: 2024-03-01
Payer: MEDICARE

## 2024-03-01 DIAGNOSIS — M54.42 CHRONIC BILATERAL LOW BACK PAIN WITH BILATERAL SCIATICA: ICD-10-CM

## 2024-03-01 DIAGNOSIS — G89.29 CHRONIC PAIN OF BOTH KNEES: Primary | ICD-10-CM

## 2024-03-01 DIAGNOSIS — M25.562 CHRONIC PAIN OF BOTH KNEES: Primary | ICD-10-CM

## 2024-03-01 DIAGNOSIS — G89.29 CHRONIC BILATERAL LOW BACK PAIN WITH BILATERAL SCIATICA: ICD-10-CM

## 2024-03-01 DIAGNOSIS — M25.561 CHRONIC PAIN OF BOTH KNEES: Primary | ICD-10-CM

## 2024-03-01 DIAGNOSIS — M54.41 CHRONIC BILATERAL LOW BACK PAIN WITH BILATERAL SCIATICA: ICD-10-CM

## 2024-03-01 PROCEDURE — 97112 NEUROMUSCULAR REEDUCATION: CPT

## 2024-03-01 PROCEDURE — 97110 THERAPEUTIC EXERCISES: CPT

## 2024-03-01 NOTE — PROGRESS NOTES
"Daily Note     Today's date: 3/1/2024  Patient name: Perico Roy  : 1965  MRN: [id number]  Referring provider: Jesi Gonzalez, *  Dx:   Encounter Diagnosis     ICD-10-CM    1. Chronic pain of both knees  M25.561     M25.562     G89.29       2. Chronic bilateral low back pain with bilateral sciatica  M54.42     M54.41     G89.29           Start Time: 0800  Stop Time: 0848  Total time in clinic (min): 48 minutes    Subjective: Pt reports that his low back and knee pain is \"a little better\" coming into today's session with pain of 5/10 noted.    Objective: See treatment diary below      Assessment: Pt tolerated treatment well. Attempted to add foam roller protocol to reduce tension in spinal musculature, but pt experienced increased pain with laying on bolster, so exercises was deferred. Pt tolerated all of exercises performed during session well, with minimal to no exacerbation of symptoms, with him being able to perform all exercises with proper form. Pt noted that his pain shot up from his back to his neck and he had increased cervical pain and headache at end of therapy session so SOR and cervical STM was performed which pt reported reduction of symptom intensity post interventions.Continue to improve spinal mobility, decreased tension in musculature, and improve strength and endurance of LE and core musculature.Patient exhibited good technique with therapeutic exercises and would benefit from continued PT      Plan: Continue per plan of care.  Progress treatment as tolerated.       Precautions: Colombian Speaking      Date  3       Visit # IE 2 3 4 5 6       FOTO IE             Re-eval IE               Manuals  31       Lumbar STM  PWK MS          Lumbar Gapping  PWK           Quad/ITB STM             Cervical STM      PWK       SOR      PWK       Neuro Re-Ed  3       TA Bracing  10x5\" 10x5\" 10x5\" ea         Hamstring Str " "2x30\"            Seated Calf Str             ITB Str             Bridges 15x GTB            Clamshells             Sciatic Nerve Glides 20x ea   20x ea         Slantboard    3x30\"         Deadbugs             Birddogs             3-Way SB Rollout  10x10\" ea  10x10\" ea 10x10\" ea 10x10\" ea        Seated T/S ext     10x10\" 10x10\" ea       Foam Roller Protocol      [place] - pain!       SLS             Ther Ex 2/6 2/14 2/16 2/20 2/23 3/1       Bike  6' 6' 6' 6' 8'       LTR 5x5\" ea 10x5\" 10x5\"   10x5\"        SLR 10x ea            SL Hip Abd             Leg Press    3x10 115# 2x15 135# 2x15 135#       SL Leg Press    3x10 75# ea 2x15 85# RLE,  2x15 85# RLE,        Standing Ball Crush March  30x ea           Pallof Press     15x 7# ea        UTR     15x 7# ea        Side Stepping    5 laps GTB 5 laps GTB 5 laps GTB       Monster Walks    5 laps GTB 5 laps GTB  5 laps GTB       3-way Hip kicks    15x ea GTB 20x ea GTB        Split Squats             Ther Activity 2/6 2/14 2/16 2/20 2/23 3/1       Squatting    20x ea GTB 2x10        Step-Ups    20x ea 1R 20x ea 1R        Gait Training 2/6 2/14 2/16 2/20                                   Modalities 2/6 2/14 2/16 2/20         MHP  8' 10'                                       "

## 2024-03-05 ENCOUNTER — OFFICE VISIT (OUTPATIENT)
Dept: PHYSICAL THERAPY | Facility: CLINIC | Age: 59
End: 2024-03-05
Payer: MEDICARE

## 2024-03-05 DIAGNOSIS — G89.29 CHRONIC BILATERAL LOW BACK PAIN WITH BILATERAL SCIATICA: ICD-10-CM

## 2024-03-05 DIAGNOSIS — G89.29 CHRONIC PAIN OF BOTH KNEES: Primary | ICD-10-CM

## 2024-03-05 DIAGNOSIS — M25.562 CHRONIC PAIN OF BOTH KNEES: Primary | ICD-10-CM

## 2024-03-05 DIAGNOSIS — M54.42 CHRONIC BILATERAL LOW BACK PAIN WITH BILATERAL SCIATICA: ICD-10-CM

## 2024-03-05 DIAGNOSIS — M25.561 CHRONIC PAIN OF BOTH KNEES: Primary | ICD-10-CM

## 2024-03-05 DIAGNOSIS — M54.41 CHRONIC BILATERAL LOW BACK PAIN WITH BILATERAL SCIATICA: ICD-10-CM

## 2024-03-05 PROCEDURE — 97112 NEUROMUSCULAR REEDUCATION: CPT

## 2024-03-05 PROCEDURE — 97010 HOT OR COLD PACKS THERAPY: CPT

## 2024-03-05 PROCEDURE — 97110 THERAPEUTIC EXERCISES: CPT

## 2024-03-05 NOTE — PROGRESS NOTES
"Daily Note     Today's date: 3/5/2024  Patient name: Perico Roy  : 1965  MRN: [id number]  Referring provider: Jesi Gonzalez, *  Dx:   Encounter Diagnosis     ICD-10-CM    1. Chronic pain of both knees  M25.561     M25.562     G89.29       2. Chronic bilateral low back pain with bilateral sciatica  M54.42     M54.41     G89.29           Start Time: 07  Stop Time: 08  Total time in clinic (min): 45 minutes    Subjective: Pt reports that his knees and back are very sore today, due to the raining weather.       Objective: See treatment diary below      Assessment: Pt tolerated treatment well. Added hamstring, hip flexor, and ITB stretch to attempt to reduce tension in associated musculature and reduce symptom intensity. Seated thoracic extensions were discharged today due to pt noting increased symptoms for 2 days after and no relief of symptoms. Added DKTC to improve lumbar mobility via flexion based stretch, as well as decrease paraspinal musculature. Pt was very irritable during today's session, presenting with increased symptom intensity occasionally throughout the session. Utilized MHP at end of therapy session to reduce tension and intensity of symptoms. Patient demonstrated fatigue post treatment and would benefit from continued PT      Plan: Continue per plan of care.  Progress treatment as tolerated.       Precautions: Thai Speaking      Date 2/6 2/14 2/16 2/20 2/23 3/1 3/5      Visit # IE 2 3 4 5 6 7      FOTO IE             Re-eval IE               Manuals  3 3/5      Lumbar STM  PWK MS          Lumbar Gapping  PWK           Quad/ITB STM             Cervical STM      PWK       SOR      PWK       Neuro Re-Ed  3 3/5      TA Bracing  10x5\" 10x5\" 10x5\" ea         Hamstring Str 2x30\"      3x30\"      Seated Calf Str             ITB Str       4x10\"      Bridges 15x GTB            Clamshells             Sciatic Nerve Glides 20x ea   20x " "ea         Slantboard    3x30\"         Deadbugs             Birddogs             3-Way SB Rollout  10x10\" ea  10x10\" ea 10x10\" ea 10x10\" ea  10x10\" ea      Seated T/S ext     10x10\" 10x10\" ea DC      Foam Roller Protocol      [place] - pain!       SLS             Ther Ex 2/6 2/14 2/16 2/20 2/23 3/1 3/5      Bike  6' 6' 6' 6' 8' 6'      LTR 5x5\" ea 10x5\" 10x5\"   10x5\"  NV      Hip Flexor Str       3x20\"      SKTC       NV      DKTC       10x10\" SB      SLR 10x ea            SL Hip Abd             Leg Press    3x10 115# 2x15 135# 2x15 135#       SL Leg Press    3x10 75# ea 2x15 85# RLE,  2x15 85# RLE,        Standing Ball Crush March  30x ea           Pallof Press     15x 7# ea        UTR     15x 7# ea        Side Stepping    5 laps GTB 5 laps GTB 5 laps GTB       Monster Walks    5 laps GTB 5 laps GTB  5 laps GTB       3-way Hip kicks    15x ea GTB 20x ea GTB        Split Squats             Ther Activity 2/6 2/14 2/16 2/20 2/23 3/1 3/5       Squatting    20x ea GTB 2x10        Step-Ups    20x ea 1R 20x ea 1R        Gait Training 2/6 2/14 2/16 2/20                                   Modalities 2/6 2/14 2/16 2/20   3/5      MHP  8' 10'    10'                                     "

## 2024-03-07 ENCOUNTER — OFFICE VISIT (OUTPATIENT)
Dept: PHYSICAL THERAPY | Facility: CLINIC | Age: 59
End: 2024-03-07
Payer: MEDICARE

## 2024-03-07 DIAGNOSIS — M54.42 CHRONIC BILATERAL LOW BACK PAIN WITH BILATERAL SCIATICA: ICD-10-CM

## 2024-03-07 DIAGNOSIS — M54.41 CHRONIC BILATERAL LOW BACK PAIN WITH BILATERAL SCIATICA: ICD-10-CM

## 2024-03-07 DIAGNOSIS — M25.562 CHRONIC PAIN OF BOTH KNEES: Primary | ICD-10-CM

## 2024-03-07 DIAGNOSIS — G89.29 CHRONIC PAIN OF BOTH KNEES: Primary | ICD-10-CM

## 2024-03-07 DIAGNOSIS — G89.29 CHRONIC BILATERAL LOW BACK PAIN WITH BILATERAL SCIATICA: ICD-10-CM

## 2024-03-07 DIAGNOSIS — M25.561 CHRONIC PAIN OF BOTH KNEES: Primary | ICD-10-CM

## 2024-03-07 PROCEDURE — 97110 THERAPEUTIC EXERCISES: CPT | Performed by: PHYSICAL THERAPIST

## 2024-03-07 PROCEDURE — 97112 NEUROMUSCULAR REEDUCATION: CPT | Performed by: PHYSICAL THERAPIST

## 2024-03-07 NOTE — PROGRESS NOTES
"Daily Note     Today's date: 3/7/2024  Patient name: Perico Roy  : 1965  MRN: [id number]  Referring provider: Jesi Gonzalez, *  Dx:   Encounter Diagnosis     ICD-10-CM    1. Chronic pain of both knees  M25.561     M25.562     G89.29       2. Chronic bilateral low back pain with bilateral sciatica  M54.42     M54.41     G89.29                      Subjective: patient reports he is in a lot of pain today, he was unsure if he should complete PT.      Objective: See treatment diary below      Assessment: Tolerated treatment fair. Started patient with MHP to lower thoracic and completed program as noted below. Patient exhibited good technique with therapeutic exercises and would benefit from continued PT      Plan: Continue per plan of care.  Progress treatment as tolerated.       Precautions: [place] Speaking      Date 2/6 2/14 2/16 2/20 2/23 3/1 3/5 3/7     Visit # IE 2 3 4 5 6 7 8     FOTO IE             Re-eval IE               Manuals 2/6 2/14 2/16 2/20 2/23 3/1 3/5 3/7     Lumbar STM  PWK MS          Lumbar Gapping  PWK           Quad/ITB STM             Cervical STM      PWK       SOR      PWK       Neuro Re-Ed 2/6 2/14 2/16 2/20 2/23 3/1 3/5 3/7     TA Bracing  10x5\" 10x5\" 10x5\" ea         Hamstring Str 2x30\"      3x30\" 3x30\"     Seated Calf Str             ITB Str       4x10\"      Bridges 15x GTB            Clamshells             Sciatic Nerve Glides 20x ea   20x ea         Slantboard    3x30\"         Deadbugs             Birddogs             3-Way SB Rollout  10x10\" ea  10x10\" ea 10x10\" ea 10x10\" ea  10x10\" ea 10x10\" ea     Seated T/S ext     10x10\" 10x10\" ea DC      Foam Roller Protocol      [place] - pain!       SLS             Ther Ex 2/6 2/14 2/16 2/20 2/23 3/1 3/5 3/7     Bike  6' 6' 6' 6' 8' 6'      LTR 5x5\" ea 10x5\" 10x5\"   10x5\"  NV 10x5\"     Hip Flexor Str       3x20\"      SKTC       NV 5\"x5     DKTC       10x10\" SB      SLR 10x ea            SL Hip Abd             Leg Press    " 3x10 115# 2x15 135# 2x15 135#       SL Leg Press    3x10 75# ea 2x15 85# RLE,  2x15 85# RLE,        Standing Ball Crush March  30x ea           Pallof Press     15x 7# ea        UTR     15x 7# ea        Side Stepping    5 laps GTB 5 laps GTB 5 laps GTB       Monster Walks    5 laps GTB 5 laps GTB  5 laps GTB       3-way Hip kicks    15x ea GTB 20x ea GTB        Split Squats             Ther Activity 2/6 2/14 2/16 2/20 2/23 3/1 3/5  3/7     Squatting    20x ea GTB 2x10        Step-Ups    20x ea 1R 20x ea 1R        Gait Training 2/6 2/14 2/16 2/20                                   Modalities 2/6 2/14 2/16 2/20   3/5 3/7     P  8' 10'    10' 10'

## 2024-03-12 ENCOUNTER — OFFICE VISIT (OUTPATIENT)
Dept: PHYSICAL THERAPY | Facility: CLINIC | Age: 59
End: 2024-03-12
Payer: MEDICARE

## 2024-03-12 DIAGNOSIS — G89.29 CHRONIC PAIN OF BOTH KNEES: Primary | ICD-10-CM

## 2024-03-12 DIAGNOSIS — M54.42 CHRONIC BILATERAL LOW BACK PAIN WITH BILATERAL SCIATICA: ICD-10-CM

## 2024-03-12 DIAGNOSIS — G89.29 CHRONIC BILATERAL LOW BACK PAIN WITH BILATERAL SCIATICA: ICD-10-CM

## 2024-03-12 DIAGNOSIS — M25.562 CHRONIC PAIN OF BOTH KNEES: Primary | ICD-10-CM

## 2024-03-12 DIAGNOSIS — M25.561 CHRONIC PAIN OF BOTH KNEES: Primary | ICD-10-CM

## 2024-03-12 DIAGNOSIS — M54.41 CHRONIC BILATERAL LOW BACK PAIN WITH BILATERAL SCIATICA: ICD-10-CM

## 2024-03-12 PROCEDURE — 97010 HOT OR COLD PACKS THERAPY: CPT

## 2024-03-12 PROCEDURE — 97112 NEUROMUSCULAR REEDUCATION: CPT

## 2024-03-12 PROCEDURE — 97110 THERAPEUTIC EXERCISES: CPT

## 2024-03-12 PROCEDURE — 97140 MANUAL THERAPY 1/> REGIONS: CPT

## 2024-03-12 NOTE — PROGRESS NOTES
"Daily Note     Today's date: 3/12/2024  Patient name: Perico Roy  : 1965  MRN: [id number]  Referring provider: Jesi Gonzalez, *  Dx:   Encounter Diagnosis     ICD-10-CM    1. Chronic pain of both knees  M25.561     M25.562     G89.29       2. Chronic bilateral low back pain with bilateral sciatica  M54.42     M54.41     G89.29           Start Time: 0730  Stop Time: 08  Total time in clinic (min): 50 minutes    Subjective: Pt reports that he is feeling good coming into today's session with improvements in symptom intensity coming into today's session.      Objective: See treatment diary below      Assessment: Pt tolerated treatment well. Pt presented with increased pain and discomfort in his low back after completing warm up bike. D/CUtilized MHP after warm up during today's session to further decrease tension in musculature, due to good successes and outcomes with modality performance last two therapy sessions. Performed manual STM after MHP which pt responded well to, with improved ROM and decreased tension in paraspinal musculature post manual interventions. Patient exhibited good technique with therapeutic exercises and would benefit from continued PT      Plan: Continue per plan of care.  Progress treatment as tolerated.       Precautions: Egyptian Speaking      Date 2/6 2/14 2/16 2/20 2/23 3/1 3/5 3/7 3/12    Visit # IE 2 3 4 5 6 7 8 9    FOTO IE             Re-eval IE               Manuals 2/6 2/14 2/16 2/20 2/23 3/1 3/5 3/7 3/12    Lumbar STM  PWK MS      PWK    Lumbar Gapping  PWK           Quad/ITB STM         PWK    Cervical STM      PWK       SOR      PWK       Neuro Re-Ed 2/6 2/14 2/16 2/20 2/23 3/1 3/5 3/7 3/12    TA Bracing  10x5\" 10x5\" 10x5\" ea         Hamstring Str 2x30\"      3x30\" 3x30\" 3x30\"    Seated Calf Str             ITB Str       4x10\"      Bridges 15x GTB            Clamshells             Sciatic Nerve Glides 20x ea   20x ea         Slantboard    3x30\"       " "  Deadbugs             Birddogs             3-Way SB Rollout  10x10\" ea  10x10\" ea 10x10\" ea 10x10\" ea  10x10\" ea 10x10\" ea 10x10\" ea    Seated T/S ext     10x10\" 10x10\" ea DC      Foam Roller Protocol      [place] - pain!       SLS             Ther Ex 2/6 2/14 2/16 2/20 2/23 3/1 3/5 3/7 3/12    Bike  6' 6' 6' 6' 8' 6'  6' No NV    LTR 5x5\" ea 10x5\" 10x5\"   10x5\"  NV 10x5\" 10x5\"    Hip Flexor Str       3x20\"      SKTC       NV 5\"x5 10x5\"    DKTC       10x10\" SB  10x5\" SB    SLR 10x ea            SL Hip Abd             Leg Press    3x10 115# 2x15 135# 2x15 135#       SL Leg Press    3x10 75# ea 2x15 85# RLE,  2x15 85# RLE,        Standing Ball Crush March  30x ea           Pallof Press     15x 7# ea        UTR     15x 7# ea        Side Stepping    5 laps GTB 5 laps GTB 5 laps GTB       Monster Walks    5 laps GTB 5 laps GTB  5 laps GTB       3-way Hip kicks    15x ea GTB 20x ea GTB        Split Squats             Ther Activity 2/6 2/14 2/16 2/20 2/23 3/1 3/5  3/7 3/12    Squatting    20x ea GTB 2x10        Step-Ups    20x ea 1R 20x ea 1R        Gait Training 2/6 2/14 2/16 2/20                                   Modalities 2/6 2/14 2/16 2/20   3/5 3/7 3/12    MHP  8' 10'    10' 10'                                        "

## 2024-03-14 ENCOUNTER — TELEPHONE (OUTPATIENT)
Dept: FAMILY MEDICINE CLINIC | Facility: CLINIC | Age: 59
End: 2024-03-14

## 2024-03-14 ENCOUNTER — OFFICE VISIT (OUTPATIENT)
Dept: PHYSICAL THERAPY | Facility: CLINIC | Age: 59
End: 2024-03-14
Payer: MEDICARE

## 2024-03-14 DIAGNOSIS — G89.29 CHRONIC PAIN OF BOTH KNEES: ICD-10-CM

## 2024-03-14 DIAGNOSIS — G89.29 CHRONIC BILATERAL LOW BACK PAIN WITH BILATERAL SCIATICA: Primary | ICD-10-CM

## 2024-03-14 DIAGNOSIS — M25.562 CHRONIC PAIN OF BOTH KNEES: ICD-10-CM

## 2024-03-14 DIAGNOSIS — M25.561 CHRONIC PAIN OF BOTH KNEES: ICD-10-CM

## 2024-03-14 DIAGNOSIS — M54.41 CHRONIC BILATERAL LOW BACK PAIN WITH BILATERAL SCIATICA: Primary | ICD-10-CM

## 2024-03-14 DIAGNOSIS — M54.42 CHRONIC BILATERAL LOW BACK PAIN WITH BILATERAL SCIATICA: Primary | ICD-10-CM

## 2024-03-14 PROCEDURE — 97164 PT RE-EVAL EST PLAN CARE: CPT

## 2024-03-14 NOTE — PROGRESS NOTES
"Daily Note     Today's date: 3/14/2024  Patient name: Perico Roy  : 1965  MRN: [id number]  Referring provider: Jesi Gonzalez, *  Dx:   Encounter Diagnosis     ICD-10-CM    1. Chronic bilateral low back pain with bilateral sciatica  M54.42     M54.41     G89.29       2. Chronic pain of both knees  M25.561     M25.562     G89.29           Start Time: 0800  Stop Time: 0840  Total time in clinic (min): 40 minutes    Subjective: Pt states he is still feeling lots of pain in the back and the knees.      Objective: See treatment diary below      Assessment: Re-evaluation with significant weakness to the Gluteals, pain to the back and lower extremities with movement and limited activity. Pt presents with worsening strength and pain since initial evaluation. Pt has progressively decreased in activity limited by pain to the paraspinals, gluteals, and bony palpation of the throacic spinous processes to the lumbar and at the PSIS. Pt appears to be in pain with all positions signifying hypersensitivity. Tolerated treatment poor as pain limited all activities. Patient would benefit from return to physician for possible imaging to lumbar spine and sacral region to assess a previously stated degeneration from a previous image from 2020.       Plan: Progress treatment as tolerated.       Precautions: [place]e Speaking      Date 2/6 2/14 2/16 2/20 2/23 3/1 3/5 3/7 3/12 3/14   Visit # IE 2 3 4 5 6 7 8 9 10   FOTO IE          26   Re-eval IE         CB      Manuals 2/6 2/14 2/16 2/20 2/23 3/1 3/5 3/7 3/12 3/14   Lumbar STM  PWK MS      PWK CB   Lumbar Gapping  PWK           Quad/ITB STM         PWK    Cervical STM      PWK       SOR      PWK       Neuro Re-Ed 2/6 2/14 2/16 2/20 2/23 3/1 3/5 3/7 3/12    TA Bracing  10x5\" 10x5\" 10x5\" ea         Hamstring Str 2x30\"      3x30\" 3x30\" 3x30\"    Seated Calf Str             ITB Str       4x10\"      Bridges 15x GTB            Clamshells             Sciatic Nerve Glides 20x " "ea   20x ea         Slantboard    3x30\"         Deadbugs             Birddogs             3-Way SB Rollout  10x10\" ea  10x10\" ea 10x10\" ea 10x10\" ea  10x10\" ea 10x10\" ea 10x10\" ea    Seated T/S ext     10x10\" 10x10\" ea DC      Foam Roller Protocol      [place] - pain!       SLS             Nerve gliding          x10 mark   Ther Ex 2/6 2/14 2/16 2/20 2/23 3/1 3/5 3/7 3/12    Bike  6' 6' 6' 6' 8' 6'  6' No NV 6'   LTR 5x5\" ea 10x5\" 10x5\"   10x5\"  NV 10x5\" 10x5\"    Hip Flexor Str       3x20\"      SKTC       NV 5\"x5 10x5\"    DKTC       10x10\" SB  10x5\" SB    SLR 10x ea            SL Hip Abd             Leg Press    3x10 115# 2x15 135# 2x15 135#       SL Leg Press    3x10 75# ea 2x15 85# RLE,  2x15 85# RLE,        Standing Ball Crush March  30x ea           Pallof Press     15x 7# ea        UTR     15x 7# ea        Side Stepping    5 laps GTB 5 laps GTB 5 laps GTB       Monster Walks    5 laps GTB 5 laps GTB  5 laps GTB       3-way Hip kicks    15x ea GTB 20x ea GTB        Split Squats             Ther Activity 2/6 2/14 2/16 2/20 2/23 3/1 3/5  3/7 3/12    Squatting    20x ea GTB 2x10        Step-Ups    20x ea 1R 20x ea 1R        Gait Training 2/6 2/14 2/16 2/20                                   Modalities 2/6 2/14 2/16 2/20   3/5 3/7 3/12    MHP  8' 10'    10' 10'                     Stop Time: 0840  Total time in clinic (min): 40 minutes    Assessment  Assessment details: Pt is a 58 y.o. year old male presenting to physical therapy for chronic pain of both knees  and chronic bilateral low back pain with bilateral sciatica. He presents with increasing pain and decrease in functional activity within the last month of care. Pt notes he has not been able to complete the exercises at home due to increasing pain. Pt may benefit from further imaging to the lumbar spine to assess structural integrity and spacing.   Impairments: abnormal muscle firing, abnormal or restricted ROM, activity intolerance, impaired physical strength, " "lacks appropriate home exercise program, pain with function and poor body mechanics    Symptom irritability: moderateUnderstanding of Dx/Px/POC: good   Prognosis: good    Goals  ST. Pt will be independent with HEP.  2. Pt will improve lumbar mobility deficits by 50%   3. Pt will improve FOTO score from baseline set during initial evaluation--- IE 42 on -->26 on 3/14    Plan  Plan details: Return to provider for further evaluation  Patient would benefit from: PT eval and skilled physical therapy  Planned modality interventions: biofeedback, manual electrical stimulation, microcurrent electrical stimulation, TENS, electrical stimulation/Russian stimulation, thermotherapy: hydrocollator packs, cryotherapy and unattended electrical stimulation  Planned therapy interventions: abdominal trunk stabilization, joint mobilization, manual therapy, massage, ADL retraining, neuromuscular re-education, body mechanics training, patient education, postural training, strengthening, stretching, therapeutic activities, therapeutic exercise, flexibility, functional ROM exercises and home exercise program  Frequency: 2x week  Duration in weeks: 6  Treatment plan discussed with: patient      Subjective Evaluation    History of Present Illness  Mechanism of injury: Pt is a 58 y.o. presenting with chronic bilateral knee pain and low back pain. Pt reports that this knee pain has been present for several months now, with no MARCO and his back pain started 20+ years ago while he was swinging a sledge hammer. Pt noted their pain is located all around his knees bilaterally, with no specific area he can' point to that is more painful then the others. HE also noted \"the MD said I have arthritis\". Pt reports pain bilaterally across his low back. Pt reports that squatting (especially rising from squat), bending his knee (causes pressure), lifting, twisting, and prolonged walking is what causes them the most pain and are the most difficult " movements for them to perfrom. Pt reports that medication helps relieve the pain. Pt stated that their main goals for therapy are pain reduction and improved function with difficult and painful movements listed above for better performance on ADLs.   Quality of life: fair    Patient Goals  Patient goals for therapy: decreased edema, decreased pain, improved balance, increased motion, increased strength and independence with ADLs/IADLs    Pain  Current pain ratin  At best pain ratin  At worst pain ratin  Quality: dull ache and sharp  Relieving factors: medications  Progression: worsening        Objective     Active Range of Motion     Lumbar   Flexion:  with pain Restriction level: maximal  Extension:  with pain Restriction level: maximal  Left lateral flexion:  with pain Restriction level: maximal  Right lateral flexion:  with pain Restriction level: moderate  Left rotation:  with pain Restriction level: moderate  Right rotation:  with pain Restriction level: moderate  Left Knee   Flexion: WFL and with pain  Extension: WFL    Right Knee   Flexion: WFL and with pain  Extension: WFL    Joint Play     Hypomobile: T11, T12, L1, L2, L3, L4, L5 and S1     Pain: T11, T12, L1, L2, L3, L4, L5 and S1     Strength/Myotome Testing     Left Hip   Planes of Motion   Flexion: 3  Extension: 3-  Abduction: 3+  External rotation: 3+  Internal rotation: 4-    Isolated Muscles   Gluteus geri: 2+  Gluteus medius: 3  TFL: 3+    Right Hip   Planes of Motion   Flexion: 3  Extension: 3-  Abduction: 3+  External rotation: 3+  Internal rotation: 4    Isolated Muscles   Gluteus maximums: 2+  Gluteus medius: 3  TFL: 3+    Left Knee   Flexion: 3  Extension: 4-    Right Knee   Flexion: 3  Extension: 4-    Left Ankle/Foot   Dorsiflexion: 5  Plantar flexion: 5  Inversion: 5  Eversion: 5    Right Ankle/Foot   Dorsiflexion: 5  Plantar flexion: 5  Inversion: 5  Eversion: 5    Tests     Lumbar     Left   Positive passive SLR and slump  test.     Right   Positive passive SLR and slump test.     Left Pelvic Girdle/Sacrum   Positive: thigh thrust.     Right Pelvic Girdle/Sacrum   Positive: thigh thrust.     Left Hip   Positive VAMSI, FADIR, femoral nerve tension and SI compression.     Right Hip   Positive VAMSI, FADIR, femoral nerve tension and SI compression.     Left Knee   Negative anterior drawer, anterior Lachman, lateral Daxa, medial Daxa, posterior drawer, valgus stress test at 0 degrees and varus stress test at 0 degrees.     Right Knee   Negative anterior drawer, anterior Lachman, lateral Daxa, medial Daxa, posterior drawer, valgus stress test at 0 degrees and varus stress test at 0 degrees.     General Comments:      Lumbar Comments  Gait: Slowed gait speed, increased pain in b/l knees after 4 laps in eval room, excessive weight shift  Squat: weight forward, poor depth, pain, heel leave round, shoulders facing ground.   Increased tension in hamstring, gastroc-soleus complex, hip flexors, ITB, and piriformis  Increased tension in lumbar paraspinals  Fair TA contraction, poor endurance of musculature  Improper firing of multifidi

## 2024-03-14 NOTE — TELEPHONE ENCOUNTER
----- Message from UBALDO Glass sent at 3/14/2024  9:59 AM EDT -----  Regarding: FW: Returning pataient  Can we please schedule back pain f/u?  ----- Message -----  From: Arsenio Hadley PT  Sent: 3/14/2024   9:32 AM EDT  To: UBALDO Glass  Subject: Returning pataient                               Dr Gonzalez,  I'm a PRN PT and haven't worked with this patient regularly but from my assessment and the decline in patient tolerance the patient would warrant further evaluation. While Re-evaluating this patient today we identified a decrease in function, and increase in pain. During evaluation patient was hypersensitive to the thoracic spine, lumbar spine, sacroiliac regions, and gluteal regions bilaterally. Pt did not improve with STM or position changes. Due to the worsening of symptoms and limiting function this patient may benefit from imaging to visualize degenerative processes to determine other potential POC options. If no significant changes noted from imaging will continue with to treat. Will defer to your judgement for imaging decisions.  Thank you,  Arsenio

## 2024-03-14 NOTE — LETTER
2024    UBALDO Glass  [address] [facility] [address]    Patient: Perico Roy   YOB: 1965   Date of Visit: 3/14/2024     Encounter Diagnosis     ICD-10-CM    1. Chronic bilateral low back pain with bilateral sciatica  M54.42     M54.41     G89.29       2. Chronic pain of both knees  M25.561     M25.562     G89.29           Dear Dr. Gonzalez:    Thank you for your recent referral of Perico Roy. Please review the attached evaluation summary from Perico's recent visit.     Please verify that you agree with the plan of care by signing the attached order.     If you have any questions or concerns, please do not hesitate to call.     I sincerely appreciate the opportunity to share in the care of one of your patients and hope to have another opportunity to work with you in the near future.       Sincerely,    Arsenio Hadley, PT      Referring Provider:      I certify that I have read the below Plan of Care and certify the need for these services furnished under this plan of treatment while under my care.                    UBALDO Glass  [address] [facility] [address]  Via In Basket          Daily Note     Today's date: 3/14/2024  Patient name: Perico Roy  : 1965  MRN: [id number]  Referring provider: Jesi Gonzalez, *  Dx:   Encounter Diagnosis     ICD-10-CM    1. Chronic bilateral low back pain with bilateral sciatica  M54.42     M54.41     G89.29       2. Chronic pain of both knees  M25.561     M25.562     G89.29           Start Time: 0800  Stop Time: 0840  Total time in clinic (min): 40 minutes    Subjective: Pt states he is still feeling lots of pain in the back and the knees.      Objective: See treatment diary below      Assessment: Re-evaluation with significant weakness to the Gluteals, pain to the back and lower extremities with movement and limited activity. Pt presents with worsening strength and pain since  "initial evaluation. Pt has progressively decreased in activity limited by pain to the paraspinals, gluteals, and bony palpation of the throacic spinous processes to the lumbar and at the PSIS. Pt appears to be in pain with all positions signifying hypersensitivity. Tolerated treatment poor as pain limited all activities. Patient would benefit from return to physician for possible imaging to lumbar spine and sacral region to assess a previously stated degeneration from a previous image from 2020.       Plan: Progress treatment as tolerated.       Precautions: Cypriot Speaking      Date 2/6 2/14 2/16 2/20 2/23 3/1 3/5 3/7 3/12 3/14   Visit # IE 2 3 4 5 6 7 8 9 10   FOTO IE          26   Re-eval IE         CB      Manuals 2/6 2/14 2/16 2/20 2/23 3/1 3/5 3/7 3/12 3/14   Lumbar STM  PWK MS      PWK CB   Lumbar Gapping  PWK           Quad/ITB STM         PWK    Cervical STM      PWK       SOR      PWK       Neuro Re-Ed 2/6 2/14 2/16 2/20 2/23 3/1 3/5 3/7 3/12    TA Bracing  10x5\" 10x5\" 10x5\" ea         Hamstring Str 2x30\"      3x30\" 3x30\" 3x30\"    Seated Calf Str             ITB Str       4x10\"      Bridges 15x GTB            Clamshells             Sciatic Nerve Glides 20x ea   20x ea         Slantboard    3x30\"         Deadbugs             Birddogs             3-Way SB Rollout  10x10\" ea  10x10\" ea 10x10\" ea 10x10\" ea  10x10\" ea 10x10\" ea 10x10\" ea    Seated T/S ext     10x10\" 10x10\" ea DC      Foam Roller Protocol      [place] - pain!       SLS             Nerve gliding          x10 mark   Ther Ex 2/6 2/14 2/16 2/20 2/23 3/1 3/5 3/7 3/12    Bike  6' 6' 6' 6' 8' 6'  6' No NV 6'   LTR 5x5\" ea 10x5\" 10x5\"   10x5\"  NV 10x5\" 10x5\"    Hip Flexor Str       3x20\"      SKTC       NV 5\"x5 10x5\"    DKTC       10x10\" SB  10x5\" SB    SLR 10x ea            SL Hip Abd             Leg Press    3x10 115# 2x15 135# 2x15 135#       SL Leg Press    3x10 75# ea 2x15 85# RLE,  2x15 85# RLE,        Standing Ball Crush March 30x ea         "   Pallof Press     15x 7# ea        UTR     15x 7# ea        Side Stepping    5 laps GTB 5 laps GTB 5 laps GTB       Monster Walks    5 laps GTB 5 laps GTB  5 laps GTB       3-way Hip kicks    15x ea GTB 20x ea GTB        Split Squats             Ther Activity 2/6 2/14 2/16 2/20 2/23 3/1 3/5  3/7 3/12    Squatting    20x ea GTB 2x10        Step-Ups    20x ea 1R 20x ea 1R        Gait Training                                    Modalities 2/6 2/14 2/16 2/20   3/5 3/7 3/12    MH  8' 10'    10' 10'                     Stop Time: 0840  Total time in clinic (min): 40 minutes    Assessment  Assessment details: Pt is a 58 y.o. year old male presenting to physical therapy for chronic pain of both knees  and chronic bilateral low back pain with bilateral sciatica. He presents with increasing pain and decrease in functional activity within the last month of care. Pt notes he has not been able to complete the exercises at home due to increasing pain. Pt may benefit from further imaging to the lumbar spine to assess structural integrity and spacing.   Impairments: abnormal muscle firing, abnormal or restricted ROM, activity intolerance, impaired physical strength, lacks appropriate home exercise program, pain with function and poor body mechanics    Symptom irritability: moderateUnderstanding of Dx/Px/POC: good   Prognosis: good    Goals  ST. Pt will be independent with HEP.  2. Pt will improve lumbar mobility deficits by 50%   3. Pt will improve FOTO score from baseline set during initial evaluation--- IE 42 on -->26 on 3/14    Plan  Plan details: Return to provider for further evaluation  Patient would benefit from: PT eval and skilled physical therapy  Planned modality interventions: biofeedback, manual electrical stimulation, microcurrent electrical stimulation, TENS, electrical stimulation/Russian stimulation, thermotherapy: hydrocollator packs, cryotherapy and unattended electrical  "stimulation  Planned therapy interventions: abdominal trunk stabilization, joint mobilization, manual therapy, massage, ADL retraining, neuromuscular re-education, body mechanics training, patient education, postural training, strengthening, stretching, therapeutic activities, therapeutic exercise, flexibility, functional ROM exercises and home exercise program  Frequency: 2x week  Duration in weeks: 6  Treatment plan discussed with: patient      Subjective Evaluation    History of Present Illness  Mechanism of injury: Pt is a 58 y.o. presenting with chronic bilateral knee pain and low back pain. Pt reports that this knee pain has been present for several months now, with no MARCO and his back pain started 20+ years ago while he was swinging a sledge hammer. Pt noted their pain is located all around his knees bilaterally, with no specific area he can' point to that is more painful then the others. HE also noted \"the MD said I have arthritis\". Pt reports pain bilaterally across his low back. Pt reports that squatting (especially rising from squat), bending his knee (causes pressure), lifting, twisting, and prolonged walking is what causes them the most pain and are the most difficult movements for them to perfrom. Pt reports that medication helps relieve the pain. Pt stated that their main goals for therapy are pain reduction and improved function with difficult and painful movements listed above for better performance on ADLs.   Quality of life: fair    Patient Goals  Patient goals for therapy: decreased edema, decreased pain, improved balance, increased motion, increased strength and independence with ADLs/IADLs    Pain  Current pain ratin  At best pain ratin  At worst pain ratin  Quality: dull ache and sharp  Relieving factors: medications  Progression: worsening        Objective     Active Range of Motion     Lumbar   Flexion:  with pain Restriction level: maximal  Extension:  with pain Restriction " level: maximal  Left lateral flexion:  with pain Restriction level: maximal  Right lateral flexion:  with pain Restriction level: moderate  Left rotation:  with pain Restriction level: moderate  Right rotation:  with pain Restriction level: moderate  Left Knee   Flexion: WFL and with pain  Extension: WFL    Right Knee   Flexion: WFL and with pain  Extension: WFL    Joint Play     Hypomobile: T11, T12, L1, L2, L3, L4, L5 and S1     Pain: T11, T12, L1, L2, L3, L4, L5 and S1     Strength/Myotome Testing     Left Hip   Planes of Motion   Flexion: 3  Extension: 3-  Abduction: 3+  External rotation: 3+  Internal rotation: 4-    Isolated Muscles   Gluteus geri: 2+  Gluteus medius: 3  TFL: 3+    Right Hip   Planes of Motion   Flexion: 3  Extension: 3-  Abduction: 3+  External rotation: 3+  Internal rotation: 4    Isolated Muscles   Gluteus maximums: 2+  Gluteus medius: 3  TFL: 3+    Left Knee   Flexion: 3  Extension: 4-    Right Knee   Flexion: 3  Extension: 4-    Left Ankle/Foot   Dorsiflexion: 5  Plantar flexion: 5  Inversion: 5  Eversion: 5    Right Ankle/Foot   Dorsiflexion: 5  Plantar flexion: 5  Inversion: 5  Eversion: 5    Tests     Lumbar     Left   Positive passive SLR and slump test.     Right   Positive passive SLR and slump test.     Left Pelvic Girdle/Sacrum   Positive: thigh thrust.     Right Pelvic Girdle/Sacrum   Positive: thigh thrust.     Left Hip   Positive VAMSI, FADIR, femoral nerve tension and SI compression.     Right Hip   Positive VAMSI, FADIR, femoral nerve tension and SI compression.     Left Knee   Negative anterior drawer, anterior Lachman, lateral Daxa, medial Daxa, posterior drawer, valgus stress test at 0 degrees and varus stress test at 0 degrees.     Right Knee   Negative anterior drawer, anterior Lachman, lateral Daxa, medial Daxa, posterior drawer, valgus stress test at 0 degrees and varus stress test at 0 degrees.     General Comments:      Lumbar Comments  Gait:  Slowed gait speed, increased pain in b/l knees after 4 laps in eval room, excessive weight shift  Squat: weight forward, poor depth, pain, heel leave round, shoulders facing ground.   Increased tension in hamstring, gastroc-soleus complex, hip flexors, ITB, and piriformis  Increased tension in lumbar paraspinals  Fair TA contraction, poor endurance of musculature  Improper firing of multifidi

## 2024-03-19 ENCOUNTER — APPOINTMENT (OUTPATIENT)
Dept: PHYSICAL THERAPY | Facility: CLINIC | Age: 59
End: 2024-03-19
Payer: MEDICARE

## 2024-03-20 ENCOUNTER — HOSPITAL ENCOUNTER (OUTPATIENT)
Dept: RADIOLOGY | Facility: HOSPITAL | Age: 59
Discharge: HOME | End: 2024-03-20
Payer: MEDICARE

## 2024-03-20 ENCOUNTER — OFFICE VISIT (OUTPATIENT)
Dept: FAMILY MEDICINE CLINIC | Facility: CLINIC | Age: 59
End: 2024-03-20

## 2024-03-20 VITALS
SYSTOLIC BLOOD PRESSURE: 134 MMHG | OXYGEN SATURATION: 94 % | BODY MASS INDEX: 33.05 KG/M2 | DIASTOLIC BLOOD PRESSURE: 90 MMHG | HEART RATE: 73 BPM | RESPIRATION RATE: 16 BRPM | WEIGHT: 211 LBS | TEMPERATURE: 98 F

## 2024-03-20 DIAGNOSIS — M54.41 CHRONIC BILATERAL LOW BACK PAIN WITH BILATERAL SCIATICA: Primary | ICD-10-CM

## 2024-03-20 DIAGNOSIS — G89.29 CHRONIC BILATERAL LOW BACK PAIN WITH BILATERAL SCIATICA: Primary | ICD-10-CM

## 2024-03-20 DIAGNOSIS — M54.42 CHRONIC BILATERAL LOW BACK PAIN WITH BILATERAL SCIATICA: Primary | ICD-10-CM

## 2024-03-20 DIAGNOSIS — I10 PRIMARY HYPERTENSION: ICD-10-CM

## 2024-03-20 DIAGNOSIS — M54.41 CHRONIC BILATERAL LOW BACK PAIN WITH BILATERAL SCIATICA: ICD-10-CM

## 2024-03-20 DIAGNOSIS — M54.42 CHRONIC BILATERAL LOW BACK PAIN WITH BILATERAL SCIATICA: ICD-10-CM

## 2024-03-20 DIAGNOSIS — G89.29 CHRONIC BILATERAL LOW BACK PAIN WITH BILATERAL SCIATICA: ICD-10-CM

## 2024-03-20 PROCEDURE — 72110 X-RAY EXAM L-2 SPINE 4/>VWS: CPT

## 2024-03-20 PROCEDURE — 99214 OFFICE O/P EST MOD 30 MIN: CPT

## 2024-03-20 RX ORDER — GABAPENTIN 300 MG/1
300 CAPSULE ORAL DAILY
Qty: 90 CAPSULE | Refills: 1 | Status: CANCELLED | OUTPATIENT
Start: 2024-03-20

## 2024-03-20 NOTE — PROGRESS NOTES
Name: Preico Roy      : 1965      MRN: [id number]  Encounter Provider: UBALDO Glass  Encounter Date: 3/20/2024   Encounter department: [facility]    Assessment & Plan     1. Chronic bilateral low back pain with bilateral sciatica  -     MRI lumbar spine w wo contrast; Future; Expected date: 2024  -     Ambulatory referral to Spine & Pain Management; Future  -     XR spine lumbar minimum 4 views non injury; Future; Expected date: 2024           Subjective      Perico Roy is a 59 y.o. male  has a past medical history of Hyperlipidemia and Pneumonia due to COVID-19 virus.  has no past surgical history on file.    He presents today for follow-up. He has been doing PT for several weeks now for chronic low back pain and it has not helped. It is actually worsening pain.       Review of Systems   Constitutional:  Negative for chills and fever.   HENT:  Negative for ear pain and sore throat.    Eyes:  Negative for pain and visual disturbance.   Respiratory:  Negative for cough and shortness of breath.    Cardiovascular:  Negative for chest pain and palpitations.   Gastrointestinal:  Negative for abdominal pain and vomiting.   Genitourinary:  Negative for dysuria and hematuria.   Musculoskeletal:  Positive for arthralgias and back pain.   Skin:  Negative for color change and rash.   Neurological:  Negative for seizures and syncope.   All other systems reviewed and are negative.      Current Outpatient Medications on File Prior to Visit   Medication Sig    aspirin (ECOTRIN LOW STRENGTH) 81 mg EC tablet Take 81 mg by mouth daily    cyclobenzaprine (FLEXERIL) 10 mg tablet Take 1 tablet (10 mg total) by mouth 2 (two) times a day as needed for muscle spasms    Diclofenac Sodium (VOLTAREN) 1 % Apply 2 g topically 4 (four) times a day as needed (knee pain)    levothyroxine (Synthroid) 50 mcg tablet Take 1 tablet (50 mcg total) by mouth daily     lidocaine (Lidoderm) 5 % Apply 1 patch topically over 12 hours daily for 7 days Remove & Discard patch within 12 hours or as directed by MD    naproxen sodium (ALEVE) 220 MG tablet Take 1 tablet (220 mg total) by mouth every 12 (twelve) hours as needed for mild pain    tadalafil (CIALIS) 5 MG tablet Take 2 tablets (10 mg total) by mouth daily as needed for erectile dysfunction    vitamin B-12 (CYANOCOBALAMIN) 500 MCG TABS Take 2 tablets (1,000 mcg total) by mouth every 7 days for 5 doses       Objective     /90 (BP Location: Right arm, Patient Position: Sitting, Cuff Size: Standard)   Pulse 73   Temp 98 °F (36.7 °C) (Temporal)   Resp 16   Wt 95.7 kg (211 lb)   SpO2 94%   BMI 33.05 kg/m²     Physical Exam  Vitals and nursing note reviewed.   Constitutional:       General: He is not in acute distress.     Appearance: He is obese. He is not ill-appearing.   HENT:      Head: Normocephalic and atraumatic.      Right Ear: External ear normal. There is no impacted cerumen.      Left Ear: External ear normal. There is no impacted cerumen.      Nose: Nose normal. No congestion.   Eyes:      Pupils: Pupils are equal, round, and reactive to light.   Cardiovascular:      Rate and Rhythm: Normal rate and regular rhythm.      Pulses: Normal pulses.      Heart sounds: Normal heart sounds. No murmur heard.  Pulmonary:      Effort: Pulmonary effort is normal.      Breath sounds: Normal breath sounds.   Abdominal:      Tenderness: There is no abdominal tenderness.   Musculoskeletal:         General: No tenderness. Normal range of motion.      Cervical back: Normal range of motion. No tenderness.   Skin:     General: Skin is warm and dry.   Neurological:      General: No focal deficit present.      Mental Status: He is alert and oriented to person, place, and time.   Psychiatric:         Mood and Affect: Mood normal.         Behavior: Behavior normal.         Thought Content: Thought content normal.         Judgment:  Judgment normal.       UBALDO Glass

## 2024-03-21 ENCOUNTER — TELEPHONE (OUTPATIENT)
Dept: FAMILY MEDICINE CLINIC | Facility: CLINIC | Age: 59
End: 2024-03-21

## 2024-03-21 ENCOUNTER — APPOINTMENT (OUTPATIENT)
Dept: PHYSICAL THERAPY | Facility: CLINIC | Age: 59
End: 2024-03-21
Payer: MEDICARE

## 2024-03-21 NOTE — TELEPHONE ENCOUNTER
----- Message from UBALDO Glass sent at 3/20/2024  4:24 PM EDT -----  Can we please call pt and let him know radiology requires kidney levels to be checked via blood work before getting MRI done? I placed the orders in the system. thanks

## 2024-03-23 ENCOUNTER — APPOINTMENT (OUTPATIENT)
Dept: LAB | Facility: HOSPITAL | Age: 59
End: 2024-03-23
Payer: MEDICARE

## 2024-03-23 DIAGNOSIS — I10 PRIMARY HYPERTENSION: ICD-10-CM

## 2024-03-23 LAB
ALBUMIN SERPL BCP-MCNC: 4.4 G/DL (ref 3.5–5)
ANION GAP SERPL CALCULATED.3IONS-SCNC: 4 MMOL/L (ref 4–13)
BUN SERPL-MCNC: 18 MG/DL (ref 5–25)
CALCIUM SERPL-MCNC: 9.4 MG/DL (ref 8.4–10.2)
CHLORIDE SERPL-SCNC: 107 MMOL/L (ref 96–108)
CO2 SERPL-SCNC: 29 MMOL/L (ref 21–32)
CREAT SERPL-MCNC: 1.17 MG/DL (ref 0.6–1.3)
GFR SERPL CREATININE-BSD FRML MDRD: 68 ML/MIN/1.73SQ M
GLUCOSE P FAST SERPL-MCNC: 106 MG/DL (ref 65–99)
PHOSPHATE SERPL-MCNC: 2.7 MG/DL (ref 2.7–4.5)
POTASSIUM SERPL-SCNC: 4.3 MMOL/L (ref 3.5–5.3)
SODIUM SERPL-SCNC: 140 MMOL/L (ref 135–147)

## 2024-03-23 PROCEDURE — 36415 COLL VENOUS BLD VENIPUNCTURE: CPT

## 2024-03-23 PROCEDURE — 80069 RENAL FUNCTION PANEL: CPT

## 2024-03-26 ENCOUNTER — APPOINTMENT (OUTPATIENT)
Dept: PHYSICAL THERAPY | Facility: CLINIC | Age: 59
End: 2024-03-26
Payer: MEDICARE

## 2024-03-26 DIAGNOSIS — N52.9 ERECTILE DYSFUNCTION, UNSPECIFIED ERECTILE DYSFUNCTION TYPE: ICD-10-CM

## 2024-03-26 RX ORDER — TADALAFIL 5 MG/1
10 TABLET ORAL DAILY PRN
Qty: 30 TABLET | Refills: 0 | Status: SHIPPED | OUTPATIENT
Start: 2024-03-26 | End: 2024-03-29 | Stop reason: SDUPTHER

## 2024-03-27 ENCOUNTER — HOSPITAL ENCOUNTER (OUTPATIENT)
Dept: MRI IMAGING | Facility: HOSPITAL | Age: 59
Discharge: HOME | End: 2024-03-27

## 2024-03-27 DIAGNOSIS — M54.42 CHRONIC BILATERAL LOW BACK PAIN WITH BILATERAL SCIATICA: ICD-10-CM

## 2024-03-27 DIAGNOSIS — M54.41 CHRONIC BILATERAL LOW BACK PAIN WITH BILATERAL SCIATICA: ICD-10-CM

## 2024-03-27 DIAGNOSIS — G89.29 CHRONIC BILATERAL LOW BACK PAIN WITH BILATERAL SCIATICA: ICD-10-CM

## 2024-03-28 ENCOUNTER — TELEPHONE (OUTPATIENT)
Dept: FAMILY MEDICINE CLINIC | Facility: CLINIC | Age: 59
End: 2024-03-28

## 2024-03-28 ENCOUNTER — APPOINTMENT (OUTPATIENT)
Dept: PHYSICAL THERAPY | Facility: CLINIC | Age: 59
End: 2024-03-28
Payer: MEDICARE

## 2024-03-28 NOTE — TELEPHONE ENCOUNTER
Pt called nurse line stating that he was referred to get an MRI done; pt states he was unable to due to he got very anxious and apt had to be rescheduled. Pt is requesting if you can send him a medication to help with the anxiety.     Please advise, thank you

## 2024-03-29 DIAGNOSIS — N52.9 ERECTILE DYSFUNCTION, UNSPECIFIED ERECTILE DYSFUNCTION TYPE: ICD-10-CM

## 2024-03-29 RX ORDER — TADALAFIL 5 MG/1
10 TABLET ORAL DAILY PRN
Qty: 30 TABLET | Refills: 0 | Status: SHIPPED | OUTPATIENT
Start: 2024-03-29

## 2024-03-30 DIAGNOSIS — F41.9 ANXIETY: Primary | ICD-10-CM

## 2024-03-30 RX ORDER — ALPRAZOLAM 0.5 MG/1
0.5 TABLET ORAL ONCE AS NEEDED
Qty: 2 TABLET | Refills: 0 | Status: SHIPPED | OUTPATIENT
Start: 2024-03-30

## 2024-04-06 ENCOUNTER — HOSPITAL ENCOUNTER (OUTPATIENT)
Facility: MEDICAL CENTER | Age: 59
Discharge: HOME | End: 2024-04-06
Payer: MEDICARE

## 2024-04-06 PROCEDURE — 72148 MRI LUMBAR SPINE W/O DYE: CPT

## 2024-04-18 ENCOUNTER — TELEPHONE (OUTPATIENT)
Dept: FAMILY MEDICINE CLINIC | Facility: CLINIC | Age: 59
End: 2024-04-18

## 2024-04-29 ENCOUNTER — TELEMEDICINE (OUTPATIENT)
Dept: FAMILY MEDICINE CLINIC | Facility: CLINIC | Age: 59
End: 2024-04-29

## 2024-04-29 DIAGNOSIS — M51.36 DEGENERATIVE DISC DISEASE, LUMBAR: Primary | ICD-10-CM

## 2024-04-29 PROBLEM — M51.369 DEGENERATIVE DISC DISEASE, LUMBAR: Status: ACTIVE | Noted: 2024-04-29

## 2024-04-29 PROCEDURE — 99213 OFFICE O/P EST LOW 20 MIN: CPT

## 2024-04-29 NOTE — ASSESSMENT & PLAN NOTE
- Discussed MRI results with patient. Answered patient's questions to his satisfaction. Denied any additional questions/concerns.   - F/u spine and pain management as scheduled on 5/2/24

## 2024-04-29 NOTE — PROGRESS NOTES
Virtual Regular Visit    Verification of patient location:    Patient is located at Home in the following state in which I hold an active license [place]      Assessment/Plan:    Problem List Items Addressed This Visit       Degenerative disc disease, lumbar - Primary     - Discussed MRI results with patient. Answered patient's questions to his satisfaction. Denied any additional questions/concerns.   - F/u spine and pain management as scheduled on 5/2/24         Relevant Orders    Ambulatory Referral to Social Work Care Management Program            Reason for visit is No chief complaint on file.       Encounter provider UBALDO Glass      Recent Visits  No visits were found meeting these conditions.  Showing recent visits within past 7 days and meeting all other requirements  Today's Visits  Date Type Provider Dept   04/29/24 Telemedicine UBALDO Glass  [facility]   Showing today's visits and meeting all other requirements  Future Appointments  No visits were found meeting these conditions.  Showing future appointments within next 150 days and meeting all other requirements       The patient was identified by name and date of birth. Perico Roy was informed that this is a telemedicine visit and that the visit is being conducted through the [employer] Teams platform. He agrees to proceed..  My office door was closed. No one else was in the room.  He acknowledged consent and understanding of privacy and security of the video platform. The patient has agreed to participate and understands they can discontinue the visit at any time.    Patient is aware this is a billable service.     Subjective      Perico Roy is a 59 y.o. male  has a past medical history of Hyperlipidemia and Pneumonia due to COVID-19 virus.  has no past surgical history on file.    Visit was scheduled today to discuss MRI results. MRI showed    LUMBAR DISC SPACES:     L1-L2:  Normal.     L2-L3: There is disc  space degeneration. There is a mild bulge. There is facet arthrosis. There is no significant canal stenosis or foraminal narrowing.     L3-L4: There is a Schmorl's node along the inferior endplate of L3. There is a mild bulge. There is no significant canal stenosis or foraminal narrowing.     L4-L5: There is disc degeneration. There is mild bulge with posterior annular fissure. There is facet arthrosis. There is no significant canal stenosis. There is mild right foraminal narrowing.     L5-S1: There is a bulging annulus with posterior annular fissure. There is facet arthrosis. There is no significant canal stenosis. There is mild to moderate bilateral foraminal narrowing.     He continues to have low back pain but he prefers to not take medication for it. He has an appt scheduled on 5/2/24 with spine and pain management. He also wants help applying for disability because he cannot work with back pain.          Past Medical History:   Diagnosis Date    Hyperlipidemia     Pneumonia due to COVID-19 virus 01/13/2022       History reviewed. No pertinent surgical history.    Current Outpatient Medications   Medication Sig Dispense Refill    aspirin (ECOTRIN LOW STRENGTH) 81 mg EC tablet Take 81 mg by mouth daily      cyclobenzaprine (FLEXERIL) 10 mg tablet Take 1 tablet (10 mg total) by mouth 2 (two) times a day as needed for muscle spasms 20 tablet 0    Diclofenac Sodium (VOLTAREN) 1 % Apply 2 g topically 4 (four) times a day as needed (knee pain) 350 g 0    levothyroxine (Synthroid) 50 mcg tablet Take 1 tablet (50 mcg total) by mouth daily 90 tablet 0    lidocaine (Lidoderm) 5 % Apply 1 patch topically over 12 hours daily for 7 days Remove & Discard patch within 12 hours or as directed by MD Flores patch 0    naproxen sodium (ALEVE) 220 MG tablet Take 1 tablet (220 mg total) by mouth every 12 (twelve) hours as needed for mild pain 60 tablet 0    tadalafil (CIALIS) 5 MG tablet Take 2 tablets (10 mg total) by mouth daily as  needed for erectile dysfunction 30 tablet 0    vitamin B-12 (CYANOCOBALAMIN) 500 MCG TABS Take 2 tablets (1,000 mcg total) by mouth every 7 days for 5 doses 10 tablet 0     No current facility-administered medications for this visit.        No Known Allergies    Review of Systems   Constitutional:  Negative for chills and fever.   HENT:  Negative for ear pain and sore throat.    Eyes:  Negative for pain and visual disturbance.   Respiratory:  Negative for cough and shortness of breath.    Cardiovascular:  Negative for chest pain and palpitations.   Gastrointestinal:  Negative for abdominal pain and vomiting.   Genitourinary:  Negative for dysuria and hematuria.   Musculoskeletal:  Positive for back pain. Negative for arthralgias.   Skin:  Negative for color change and rash.   Neurological:  Negative for seizures and syncope.   All other systems reviewed and are negative.      Video Exam    There were no vitals filed for this visit.    Physical Exam  Neurological:      General: No focal deficit present.      Mental Status: He is alert and oriented to person, place, and time. Mental status is at baseline.   Psychiatric:         Mood and Affect: Mood normal.         Behavior: Behavior normal.         Judgment: Judgment normal.          Visit Time  Total Visit Duration: 15

## 2024-05-01 ENCOUNTER — PATIENT OUTREACH (OUTPATIENT)
Dept: FAMILY MEDICINE CLINIC | Facility: CLINIC | Age: 59
End: 2024-05-01

## 2024-05-01 NOTE — PROGRESS NOTES
ELI FORMAN did receive a new referral from provider in regard Pt needs help applying for SSI disability. After chart review ELI FORMAN did place a call to Pt to assist as needed. ELI FORMAN introduced herself, her role, and explained Pt the purpose of this call in [place]. Pt stated that he applied for SSA already and just he needs a documents from provider that explain his diagnosis.     ELI FORMAN inquired Pt if there is any other social needs that he needs help with. Pt denied other social needs at this time. Pt expressed that he lives with his wife in [place], receives SNAP benefits, drives self. ELI FORMAN informed Pt that this referral will closed today, however, he can reach out the [facility] for further assistance. ELI  is available Monday through Friday within office hours. Pt seems understanding and thankful for the ELI  support.     ELI  is closing this referral today due to there is no social needs at this time.  ELI  is remain available for further assistance as needed.

## 2024-05-02 ENCOUNTER — CONSULT (OUTPATIENT)
Dept: PAIN MEDICINE | Facility: MEDICAL CENTER | Age: 59
End: 2024-05-02
Payer: MEDICARE

## 2024-05-02 VITALS
OXYGEN SATURATION: 96 % | HEIGHT: 68 IN | DIASTOLIC BLOOD PRESSURE: 77 MMHG | WEIGHT: 205 LBS | HEART RATE: 68 BPM | BODY MASS INDEX: 31.07 KG/M2 | SYSTOLIC BLOOD PRESSURE: 142 MMHG

## 2024-05-02 DIAGNOSIS — M54.41 CHRONIC BILATERAL LOW BACK PAIN WITH BILATERAL SCIATICA: ICD-10-CM

## 2024-05-02 DIAGNOSIS — M54.42 CHRONIC BILATERAL LOW BACK PAIN WITH BILATERAL SCIATICA: ICD-10-CM

## 2024-05-02 DIAGNOSIS — M54.16 LUMBAR RADICULITIS: Primary | ICD-10-CM

## 2024-05-02 DIAGNOSIS — G89.29 CHRONIC BILATERAL LOW BACK PAIN WITH BILATERAL SCIATICA: ICD-10-CM

## 2024-05-02 PROCEDURE — 99204 OFFICE O/P NEW MOD 45 MIN: CPT | Performed by: PHYSICAL MEDICINE & REHABILITATION

## 2024-05-02 NOTE — PROGRESS NOTES
Assessment  1. Lumbar radiculitis    2. Chronic bilateral low back pain with bilateral sciatica        Plan  We'll schedule patient for bilateral L5-S1 transforaminal epidural steroid injection. This may need to be repeated.  Complete risks and benefits including bleeding, infection, tissue reaction, allergic reaction were discussed. Verbal consent obtained.    This patient is being managed for a complex and serious condition that requires ongoing, intensive medical management. The nature of this condition demands constant vigilance and a nuanced approach to treatment. The condition necessitates an in-depth and focused approach to management, including regular monitoring and potential coordination with other healthcare professionals.    Detailed Description of Visit Complexity: This visit involved an intricate evaluation and management of the patient's condition. The complexity of the visit was due to the need for a detailed assessment of the current state, consideration of potential complications, and a careful balancing of treatment options to manage the condition effectively.    Patient's Health Status and History: This patient has a significant pain history which requires regular and detailed management. The condition's impact on their life and health is substantial, necessitating a comprehensive and tailored approach to chronic ongoing care.    My impressions and treatment recommendations were discussed in detail with the patient who verbalized understanding and had no further questions.  Discharge instructions were provided. I personally saw and examined the patient and I agree with the above discussed plan of care.    Orders Placed This Encounter   Procedures    FL spine and pain procedure     Standing Status:   Future     Standing Expiration Date:   5/2/2025     Order Specific Question:   Reason for Exam:     Answer:   (B) L5-S1 TFESI     Order Specific Question:   Anticoagulant hold needed?     Answer:   no      No orders of the defined types were placed in this encounter.      History of Present Illness    Perico Roy is a 59 y.o. male seen in consultation at the request of UBALDO Winchester regarding chronic back and radiating bilateral lower extremity pain.  An MRI of the lumbar spine was obtained and demonstrates bilateral L5-S1 foraminal stenosis likely accounting for his symptoms.  Currently rates the pain as a 9/10 worse in the morning and afternoon but constant.  Social history negative for tobacco marijuana and alcohol use.    He has not tried any conservative care at this time.  Wishes to avoid surgery if possible.    I have personally reviewed and/or updated the patient's past medical history, past surgical history, family history, social history, current medications, allergies, and vital signs today.     Review of Systems   Constitutional:  Negative for fever and unexpected weight change.   HENT:  Negative for trouble swallowing.    Eyes:  Negative for visual disturbance.   Respiratory:  Positive for shortness of breath. Negative for wheezing.    Cardiovascular:  Negative for chest pain and palpitations.   Gastrointestinal:  Negative for constipation, diarrhea, nausea and vomiting.   Endocrine: Negative for cold intolerance, heat intolerance and polydipsia.   Genitourinary:  Negative for difficulty urinating and frequency.   Musculoskeletal:  Positive for back pain and myalgias. Negative for arthralgias, gait problem and joint swelling.   Skin:  Negative for rash.   Neurological:  Positive for headaches. Negative for dizziness, seizures, syncope and weakness.   Hematological:  Does not bruise/bleed easily.   Psychiatric/Behavioral:  Negative for dysphoric mood.    All other systems reviewed and are negative.      Patient Active Problem List   Diagnosis    Restless leg syndrome    Hyperlipidemia    Erectile dysfunction    Chronic bilateral low back pain with bilateral sciatica    TOM (obstructive  sleep apnea)    GERD with apnea    Chronic pain of both knees    Lateral epicondylitis of both elbows    Pain of both shoulder joints    Obesity (BMI 30-39.9)    Bell's palsy    RBBB    Hypertension    Elevated TSH    Sleep trouble    Degenerative disc disease, lumbar       Past Medical History:   Diagnosis Date    Arthritis     GERD (gastroesophageal reflux disease)     Hyperlipidemia     Peripheral neuropathy     Pneumonia due to COVID-19 virus 01/13/2022       No past surgical history on file.    Family History   Problem Relation Age of Onset    Cancer Mother        Social History     Occupational History    Not on file   Tobacco Use    Smoking status: Former     Types: Cigarettes     Passive exposure: Past    Smokeless tobacco: Never   Vaping Use    Vaping status: Never Used   Substance and Sexual Activity    Alcohol use: Not Currently     Alcohol/week: 1.0 standard drink of alcohol     Types: 1 Cans of beer per week    Drug use: No    Sexual activity: Yes       Current Outpatient Medications on File Prior to Visit   Medication Sig    aspirin (ECOTRIN LOW STRENGTH) 81 mg EC tablet Take 81 mg by mouth daily    cyclobenzaprine (FLEXERIL) 10 mg tablet Take 1 tablet (10 mg total) by mouth 2 (two) times a day as needed for muscle spasms    Diclofenac Sodium (VOLTAREN) 1 % Apply 2 g topically 4 (four) times a day as needed (knee pain)    levothyroxine (Synthroid) 50 mcg tablet Take 1 tablet (50 mcg total) by mouth daily    naproxen sodium (ALEVE) 220 MG tablet Take 1 tablet (220 mg total) by mouth every 12 (twelve) hours as needed for mild pain    tadalafil (CIALIS) 5 MG tablet Take 2 tablets (10 mg total) by mouth daily as needed for erectile dysfunction    lidocaine (Lidoderm) 5 % Apply 1 patch topically over 12 hours daily for 7 days Remove & Discard patch within 12 hours or as directed by MD    vitamin B-12 (CYANOCOBALAMIN) 500 MCG TABS Take 2 tablets (1,000 mcg total) by mouth every 7 days for 5 doses     No  "current facility-administered medications on file prior to visit.       No Known Allergies    Physical Exam    /77   Pulse 68   Ht 5' 8\" (1.727 m)   Wt 93 kg (205 lb)   SpO2 96%   BMI 31.17 kg/m²     Constitutional: normal, well developed, well nourished, alert, in no distress and non-toxic and no overt pain behavior.  Eyes: anicteric  HEENT: grossly intact  Neck: supple, symmetric, trachea midline and no masses   Pulmonary:even and unlabored  Cardiovascular:No edema or pitting edema present  Psychiatric:Mood and affect appropriate  Neurologic:Cranial Nerves II-XII grossly intact, bilateral lower extremity muscle stretch reflexes are physiologic and symmetric at the knees and ankles, positive straight leg raise from a seated position bilaterally, patient complains of pain radiating in an L5 distribution down both legs  Musculoskeletal:normal    Imaging      MRI lumbar spine wo contrast: Result Notes     UBALDO Glass  4/15/2024  7:47 AM EDT       Please schedule virtual visit to discuss MRI results. Please do not schedule in person unless pt requests.            Study Result    Narrative & Impression   MRI LUMBAR SPINE WITHOUT CONTRAST     INDICATION: M54.42: Lumbago with sciatica, left side  M54.41: Lumbago with sciatica, right side  G89.29: Other chronic pain.     COMPARISON:  None.     TECHNIQUE:  Multiplanar, multisequence imaging of the lumbar spine was performed. .        IMAGE QUALITY:  Diagnostic     FINDINGS:     VERTEBRAL BODIES:  There are 5 lumbar type vertebral bodies.  Normal alignment of the lumbar spine.  No spondylolysis or spondylolisthesis. No scoliosis.  No compression fracture.    Normal marrow signal is identified within the visualized bony   structures.  No discrete marrow lesion.     SACRUM:  Normal signal within the sacrum. No evidence of insufficiency or stress fracture.     DISTAL CORD AND CONUS:  Normal size and signal within the distal cord and conus.   "   PARASPINAL SOFT TISSUES:  Paraspinal soft tissues are unremarkable.     LOWER THORACIC DISC SPACES:  Normal disc height and signal.  No disc herniation, canal stenosis or foraminal narrowing.     LUMBAR DISC SPACES:     L1-L2:  Normal.     L2-L3: There is disc space degeneration. There is a mild bulge. There is facet arthrosis. There is no significant canal stenosis or foraminal narrowing.     L3-L4: There is a Schmorl's node along the inferior endplate of L3. There is a mild bulge. There is no significant canal stenosis or foraminal narrowing.     L4-L5: There is disc degeneration. There is mild bulge with posterior annular fissure. There is facet arthrosis. There is no significant canal stenosis. There is mild right foraminal narrowing.     L5-S1: There is a bulging annulus with posterior annular fissure. There is facet arthrosis. There is no significant canal stenosis. There is mild to moderate bilateral foraminal narrowing.     OTHER FINDINGS: There are bilateral renal cysts.     IMPRESSION:     Degenerative changes of the lumbar spine, as described above.        Workstation performed: [id number]

## 2024-05-17 ENCOUNTER — HOSPITAL ENCOUNTER (OUTPATIENT)
Dept: RADIOLOGY | Facility: MEDICAL CENTER | Age: 59
End: 2024-05-17
Payer: MEDICARE

## 2024-05-17 VITALS
SYSTOLIC BLOOD PRESSURE: 122 MMHG | TEMPERATURE: 98.2 F | RESPIRATION RATE: 20 BRPM | DIASTOLIC BLOOD PRESSURE: 74 MMHG | OXYGEN SATURATION: 95 % | HEART RATE: 72 BPM

## 2024-05-17 DIAGNOSIS — M54.16 LUMBAR RADICULITIS: ICD-10-CM

## 2024-05-17 PROCEDURE — 64483 NJX AA&/STRD TFRM EPI L/S 1: CPT | Performed by: PHYSICAL MEDICINE & REHABILITATION

## 2024-05-17 RX ORDER — PAPAVERINE HCL 150 MG
10 CAPSULE, EXTENDED RELEASE ORAL ONCE
Status: COMPLETED | OUTPATIENT
Start: 2024-05-17 | End: 2024-05-17

## 2024-05-17 RX ADMIN — IOHEXOL 2 ML: 300 INJECTION, SOLUTION INTRAVENOUS at 09:29

## 2024-05-17 RX ADMIN — Medication 10 MG: at 09:29

## 2024-05-17 NOTE — H&P
History of Present Illness: The patient is a 59 y.o. male who presents with complaints of bilateral lower back and leg pain    Past Medical History:   Diagnosis Date    Arthritis     GERD (gastroesophageal reflux disease)     Hyperlipidemia     Peripheral neuropathy     Pneumonia due to COVID-19 virus 01/13/2022       No past surgical history on file.      Current Outpatient Medications:     aspirin (ECOTRIN LOW STRENGTH) 81 mg EC tablet, Take 81 mg by mouth daily, Disp: , Rfl:     cyclobenzaprine (FLEXERIL) 10 mg tablet, Take 1 tablet (10 mg total) by mouth 2 (two) times a day as needed for muscle spasms, Disp: 20 tablet, Rfl: 0    Diclofenac Sodium (VOLTAREN) 1 %, Apply 2 g topically 4 (four) times a day as needed (knee pain), Disp: 350 g, Rfl: 0    levothyroxine (Synthroid) 50 mcg tablet, Take 1 tablet (50 mcg total) by mouth daily, Disp: 90 tablet, Rfl: 0    lidocaine (Lidoderm) 5 %, Apply 1 patch topically over 12 hours daily for 7 days Remove & Discard patch within 12 hours or as directed by MD, Disp: 7 patch, Rfl: 0    naproxen sodium (ALEVE) 220 MG tablet, Take 1 tablet (220 mg total) by mouth every 12 (twelve) hours as needed for mild pain, Disp: 60 tablet, Rfl: 0    tadalafil (CIALIS) 5 MG tablet, Take 2 tablets (10 mg total) by mouth daily as needed for erectile dysfunction, Disp: 30 tablet, Rfl: 0    vitamin B-12 (CYANOCOBALAMIN) 500 MCG TABS, Take 2 tablets (1,000 mcg total) by mouth every 7 days for 5 doses, Disp: 10 tablet, Rfl: 0    Current Facility-Administered Medications:     dexamethasone (PF) (DECADRON) injection 10 mg, 10 mg, Epidural, Once, Dalton Arenas DO    iohexol (OMNIPAQUE) 300 mg/mL injection 2 mL, 2 mL, Epidural, Once, Dalton Arenas DO    No Known Allergies    Physical Exam:   Vitals:    05/17/24 0914   BP: 121/73   Pulse: 67   Resp: 20   Temp: 98.2 °F (36.8 °C)   SpO2: 94%     General: Awake, Alert, Oriented x 3, Mood and affect appropriate  Respiratory: Respirations even and  unlabored  Cardiovascular: Peripheral pulses intact; no edema  Musculoskeletal Exam: bilateral lower back and leg pain    ASA Score: 3    Patient/Chart Verification  Patient ID Verified: Verbal  ID Band Applied: No  Consents Confirmed: Procedural, To be obtained in the Pre-Procedure area  H&P( within 30 days) Verified: To be obtained in the Pre-Procedure area  Interval H&P(within 24 hr) Complete (required for Outpatients and Surgery Admit only): To be obtained in the Pre-Procedure area  Allergies Reviewed: Yes  Anticoag/NSAID held?: NA  Currently on antibiotics?: No    Assessment:   1. Lumbar radiculitis        Plan: (B) L5-S1 TFESI

## 2024-05-17 NOTE — DISCHARGE INSTRUCTIONS
Epidural Steroid Injection   WHAT YOU NEED TO KNOW:   An epidural steroid injection (YARELIS) is a procedure to inject steroid medicine into the epidural space. The epidural space is between your spinal cord and vertebrae. Steroids reduce inflammation and fluid buildup in your spine that may be causing pain. You may be given pain medicine along with the steroids.          ACTIVITY  Do not drive or operate machinery today.  No strenuous activity today - bending, lifting, etc.  You may resume normal activites starting tomorrow - start slowly and as tolerated.  You may shower today, but no tub baths or hot tubs.  You may have numbness for several hours from the local anesthetic. Please use caution and common sense, especially with weight-bearing activities.    CARE OF THE INJECTION SITE  If you have soreness or pain, apply ice to the area today (20 minutes on/20 minutes off).  Starting tomorrow, you may use warm, moist heat or ice if needed.  You may have an increase or change in your discomfort for 36-48 hours after your treatment.  Apply ice and continue with any pain medication you have been prescribed.  Notify [facility] if you have any of the following: redness, drainage, swelling, headache, stiff neck or fever above 100°F.    SPECIAL INSTRUCTIONS  Our office will contact you in approximately 7 days for a progress report.    MEDICATIONS  Continue to take all routine medications.  Our office may have instructed you to hold some medications.    As no general anesthesia was used in today's procedure, you should not experience any side effects related to anesthesia.     If you are diabetic, the steroids used in today's injection may temporarily increase your blood sugar levels after the first few days after your injection. Please keep a close eye on your sugars and alert the doctor who manages your diabetes if your sugars are significantly high from your baseline or you are symptomatic.     If you have a  problem specifically related to your procedure, please call our office at [phone].  Problems not related to your procedure should be directed to your primary care physician.  INSTRUCCIONES PARA EL SHARMAINE DE RUBIN INYECCIÓN EPIDURAL DE ESTEROIDES    ACTIVIDAD   No conduzca ni opere maquinarias por hoy.   No realice actividades extenuantes por hoy, yisel agacharse, levantar objetos, etc.   Puede retomar fiorella actividades normales desde mañana. Comience progresivamente y en la medida que lo tolere.   Puede ducharse, rey no se bañe en tinas ni en jacuzzis por hoy.   Puede sentir entumecimiento lora varias horas debido a la anestesia local. Sea precavido y use el sentido común, en especial con las actividades que implican la carga de peso.    CUIDADO DEL ÁREA DE APLICACIÓN DE LA INYECCIÓN   Si siente molestias o dolor, aplique hielo en el área por lacey (colóquelo lora 20 minutos y retírelo lora otros 20 minutos).   A partir de mañana, podrá aplicar calor húmedo o hielo, si lo necesita.   Puede experimentar un aumento o cambio en el malestar lora las 36-48 horas posteriores al tratamiento. Aplique hielo y continúe tomando cualquier analgésico que le hayan recetado.   Informe a [facility] si se presenta alguno de estos síntomas: enrojecimiento, secreción, inflamación, dolor de traci, rigidez de yulia o fiebre superior a 100 °F.    INSTRUCCIONES ESPECIALES  Se pondrán en contacto de nuestro consultorio con usted en aproximadamente 7 días para pedir un informe de progreso.    MEDICAMENTOS   Continúe tomando todos fiorella medicamentos de rutina.   Es posible que en nuestro consultorio le hayan indicado que deje de sandie algunos medicamentos.   Puede volver a tomarlos el:              Si tiene algún problema relacionado específicamente con el procedimiento, llame a nuestro consultorio al [phone]. Si tiene problemas que no están relacionados con sheldon procedimiento, debe comunicarse con [facility]on médico de  arsh.

## 2024-05-18 DIAGNOSIS — N52.9 ERECTILE DYSFUNCTION, UNSPECIFIED ERECTILE DYSFUNCTION TYPE: ICD-10-CM

## 2024-05-20 RX ORDER — TADALAFIL 5 MG/1
10 TABLET ORAL DAILY PRN
Qty: 30 TABLET | Refills: 0 | Status: SHIPPED | OUTPATIENT
Start: 2024-05-20 | End: 2024-05-29 | Stop reason: SDUPTHER

## 2024-05-24 ENCOUNTER — TELEPHONE (OUTPATIENT)
Dept: PAIN MEDICINE | Facility: CLINIC | Age: 59
End: 2024-05-24

## 2024-05-29 DIAGNOSIS — N52.9 ERECTILE DYSFUNCTION, UNSPECIFIED ERECTILE DYSFUNCTION TYPE: ICD-10-CM

## 2024-05-29 RX ORDER — TADALAFIL 5 MG/1
10 TABLET ORAL DAILY PRN
Qty: 30 TABLET | Refills: 0 | Status: SHIPPED | OUTPATIENT
Start: 2024-05-29

## 2024-06-14 ENCOUNTER — OFFICE VISIT (OUTPATIENT)
Dept: PAIN MEDICINE | Facility: MEDICAL CENTER | Age: 59
End: 2024-06-14
Payer: MEDICARE

## 2024-06-14 VITALS
HEIGHT: 68 IN | SYSTOLIC BLOOD PRESSURE: 116 MMHG | OXYGEN SATURATION: 96 % | BODY MASS INDEX: 31.22 KG/M2 | WEIGHT: 206 LBS | HEART RATE: 82 BPM | DIASTOLIC BLOOD PRESSURE: 76 MMHG

## 2024-06-14 DIAGNOSIS — G89.29 CHRONIC BILATERAL LOW BACK PAIN WITH BILATERAL SCIATICA: ICD-10-CM

## 2024-06-14 DIAGNOSIS — M54.16 LUMBAR RADICULITIS: Primary | ICD-10-CM

## 2024-06-14 DIAGNOSIS — M54.42 CHRONIC BILATERAL LOW BACK PAIN WITH BILATERAL SCIATICA: ICD-10-CM

## 2024-06-14 DIAGNOSIS — M54.41 CHRONIC BILATERAL LOW BACK PAIN WITH BILATERAL SCIATICA: ICD-10-CM

## 2024-06-14 PROCEDURE — 99213 OFFICE O/P EST LOW 20 MIN: CPT

## 2024-06-14 NOTE — PROGRESS NOTES
Assessment:  1. Lumbar radiculitis    2. Chronic bilateral low back pain with bilateral sciatica        Plan:  At this time, no further intervention is necessary.  Patient is reporting 100% relief of low back and lower extremity pain following bilateral L5-S1 TFESI.  He is aware that this can be repeated on an as-needed basis if his pain returns or worsens.    My impressions and treatment recommendations were discussed in detail with the patient who verbalized understanding and had no further questions.  Discharge instructions were provided. I personally saw and examined the patient and I agree with the above discussed plan of care.      History of Present Illness:  Perico Roy is a 59 y.o. male who presents for a follow up office visit after undergoing bilateral L5-S1 TFESI.  The patient is able to report 100% relief of low back and lower extremity pain following this procedure.  He is very happy with these results and has no complaints today.  He does occasionally have mid/low back pain when he does strenuous work.  At its worst, this pain is rated a 5/10 in intensity.  He describes it as a burning sensation.  He does not feel this pain is severe enough to warrant any injection therapy at this time.    Please note that the patient is primarily Urdu speaking and required interpretive services for this office visit. Interpretor [id number] was available throughout the entire office visit and interpreted with the patient in agreement and verbalizing understanding.    I have personally reviewed and/or updated the patient's past medical history, past surgical history, family history, social history, current medications, allergies, and vital signs today.     Review of Systems   Respiratory:  Negative for shortness of breath.    Cardiovascular:  Negative for chest pain.   Gastrointestinal:  Negative for constipation, diarrhea, nausea and vomiting.   Musculoskeletal:  Positive for back pain and myalgias. Negative  for arthralgias, gait problem and joint swelling.   Skin:  Negative for rash.   Neurological:  Negative for dizziness, seizures and weakness.   All other systems reviewed and are negative.      Patient Active Problem List   Diagnosis    Restless leg syndrome    Hyperlipidemia    Erectile dysfunction    Chronic bilateral low back pain with bilateral sciatica    TOM (obstructive sleep apnea)    GERD with apnea    Chronic pain of both knees    Lateral epicondylitis of both elbows    Pain of both shoulder joints    Obesity (BMI 30-39.9)    Bell's palsy    RBBB    Hypertension    Elevated TSH    Sleep trouble    Degenerative disc disease, lumbar    Lumbar radiculitis       Past Medical History:   Diagnosis Date    Arthritis     GERD (gastroesophageal reflux disease)     Hyperlipidemia     Peripheral neuropathy     Pneumonia due to COVID-19 virus 01/13/2022       History reviewed. No pertinent surgical history.    Family History   Problem Relation Age of Onset    Cancer Mother     No Known Problems Father        Social History     Occupational History    Not on file   Tobacco Use    Smoking status: Former     Types: Cigarettes     Passive exposure: Past    Smokeless tobacco: Never   Vaping Use    Vaping status: Never Used   Substance and Sexual Activity    Alcohol use: Not Currently     Alcohol/week: 1.0 standard drink of alcohol     Types: 1 Cans of beer per week    Drug use: No    Sexual activity: Yes       Current Outpatient Medications on File Prior to Visit   Medication Sig    aspirin (ECOTRIN LOW STRENGTH) 81 mg EC tablet Take 81 mg by mouth daily    cyclobenzaprine (FLEXERIL) 10 mg tablet Take 1 tablet (10 mg total) by mouth 2 (two) times a day as needed for muscle spasms    levothyroxine (Synthroid) 50 mcg tablet Take 1 tablet (50 mcg total) by mouth daily    naproxen sodium (ALEVE) 220 MG tablet Take 1 tablet (220 mg total) by mouth every 12 (twelve) hours as needed for mild pain    tadalafil (CIALIS) 5 MG tablet  "Take 2 tablets (10 mg total) by mouth daily as needed for erectile dysfunction    Diclofenac Sodium (VOLTAREN) 1 % Apply 2 g topically 4 (four) times a day as needed (knee pain) (Patient not taking: Reported on 6/14/2024)    lidocaine (Lidoderm) 5 % Apply 1 patch topically over 12 hours daily for 7 days Remove & Discard patch within 12 hours or as directed by MD    vitamin B-12 (CYANOCOBALAMIN) 500 MCG TABS Take 2 tablets (1,000 mcg total) by mouth every 7 days for 5 doses     No current facility-administered medications on file prior to visit.       No Known Allergies    Physical Exam:    /76   Pulse 82   Ht 5' 8\" (1.727 m)   Wt 93.4 kg (206 lb)   SpO2 96%   BMI 31.32 kg/m²     Constitutional:normal, well developed, well nourished, alert, in no distress and non-toxic and no overt pain behavior.  Eyes:anicteric  HEENT:grossly intact  Neck:supple, symmetric, trachea midline and no masses   Pulmonary:even and unlabored  Cardiovascular:No edema or pitting edema present  Skin:Normal without rashes or lesions and well hydrated  Psychiatric:Mood and affect appropriate  Neurologic:Cranial Nerves II-XII grossly intact  Musculoskeletal:normal    "

## 2024-06-20 ENCOUNTER — OFFICE VISIT (OUTPATIENT)
Dept: FAMILY MEDICINE CLINIC | Facility: CLINIC | Age: 59
End: 2024-06-20

## 2024-06-20 VITALS
SYSTOLIC BLOOD PRESSURE: 146 MMHG | TEMPERATURE: 98.3 F | HEART RATE: 79 BPM | BODY MASS INDEX: 31.3 KG/M2 | DIASTOLIC BLOOD PRESSURE: 76 MMHG | RESPIRATION RATE: 20 BRPM | HEIGHT: 68 IN | OXYGEN SATURATION: 95 % | WEIGHT: 206.5 LBS

## 2024-06-20 DIAGNOSIS — I10 PRIMARY HYPERTENSION: ICD-10-CM

## 2024-06-20 DIAGNOSIS — G89.29 CHRONIC BILATERAL LOW BACK PAIN WITH BILATERAL SCIATICA: Primary | ICD-10-CM

## 2024-06-20 DIAGNOSIS — M54.42 CHRONIC BILATERAL LOW BACK PAIN WITH BILATERAL SCIATICA: Primary | ICD-10-CM

## 2024-06-20 DIAGNOSIS — M54.41 CHRONIC BILATERAL LOW BACK PAIN WITH BILATERAL SCIATICA: Primary | ICD-10-CM

## 2024-06-20 DIAGNOSIS — N52.8 OTHER MALE ERECTILE DYSFUNCTION: ICD-10-CM

## 2024-06-20 PROCEDURE — 99214 OFFICE O/P EST MOD 30 MIN: CPT

## 2024-06-20 RX ORDER — TADALAFIL 10 MG/1
10 TABLET ORAL DAILY PRN
Qty: 10 TABLET | Refills: 0 | Status: SHIPPED | OUTPATIENT
Start: 2024-06-20

## 2024-06-20 NOTE — PROGRESS NOTES
Ambulatory Visit  Name: Perico Roy      : 1965      MRN: [id number]  Encounter Provider: UBALDO Glass  Encounter Date: 2024   Encounter department: [facility]    Assessment & Plan   1. Chronic bilateral low back pain with bilateral sciatica  Assessment & Plan:  Doing well. Recommend applying for disability.   2. Primary hypertension  Assessment & Plan:  BP Readings from Last 3 Encounters:   24 146/76   24 116/76   24 122/74     Above goal at today's OV. Currently not on medication.   -recommend eating a low salt diet (such as the DASH diet), limiting alcohol, exercising, and wt loss.  - consider starting medication at next OV if BP remains elevated.    3. Other male erectile dysfunction  -     tadalafil (CIALIS) 10 MG tablet; Take 1 tablet (10 mg total) by mouth daily as needed for erectile dysfunction       History of Present Illness     Perico Roy is a 59 y.o. male  has a past medical history of Arthritis, GERD (gastroesophageal reflux disease), Hyperlipidemia, Peripheral neuropathy, and Pneumonia due to COVID-19 virus.  has no past surgical history on file.    He presents today for a back pain follow-up. He received a back injection which helped significantly but then he started to try working again and had back pain when working only especially with twisting motion. 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 Then he tried working in a  job which caused same pain. He is not interested in trying PT again. Currently, he does not have pain        Review of Systems   Constitutional:  Negative for chills and fever.   HENT:  Negative for ear pain and sore throat.    Eyes:  Negative for pain and visual disturbance.   Respiratory:  Negative for cough and shortness of breath.    Cardiovascular:  Negative for chest pain and palpitations.   Gastrointestinal:   "Negative for abdominal pain and vomiting.   Genitourinary:  Negative for dysuria and hematuria.   Musculoskeletal:  Negative for arthralgias and back pain.   Skin:  Negative for color change and rash.   Neurological:  Negative for seizures and syncope.   All other systems reviewed and are negative.      Objective     /76 (BP Location: Left arm, Patient Position: Sitting, Cuff Size: Standard)   Pulse 79   Temp 98.3 °F (36.8 °C) (Temporal)   Resp 20   Ht 5' 8\" (1.727 m)   Wt 93.7 kg (206 lb 8 oz)   SpO2 95%   BMI 31.40 kg/m²     Physical Exam  Vitals and nursing note reviewed.   Constitutional:       General: He is not in acute distress.     Appearance: He is well-developed. He is obese.   HENT:      Head: Normocephalic and atraumatic.      Right Ear: External ear normal.      Left Ear: External ear normal.      Nose: Nose normal.      Mouth/Throat:      Mouth: Mucous membranes are moist.      Pharynx: Oropharynx is clear.   Eyes:      Extraocular Movements: Extraocular movements intact.      Conjunctiva/sclera: Conjunctivae normal.      Pupils: Pupils are equal, round, and reactive to light.   Cardiovascular:      Rate and Rhythm: Regular rhythm.      Pulses: Normal pulses.      Heart sounds: Normal heart sounds. No murmur heard.  Pulmonary:      Effort: Pulmonary effort is normal. No respiratory distress.      Breath sounds: Normal breath sounds.   Abdominal:      General: Bowel sounds are normal.      Palpations: Abdomen is soft.      Tenderness: There is no abdominal tenderness.   Musculoskeletal:         General: No swelling. Normal range of motion.      Cervical back: Normal range of motion and neck supple.   Skin:     General: Skin is warm and dry.      Capillary Refill: Capillary refill takes less than 2 seconds.   Neurological:      General: No focal deficit present.      Mental Status: He is alert and oriented to person, place, and time. Mental status is at baseline.   Psychiatric:         Mood " and Affect: Mood normal.         Behavior: Behavior normal.         Thought Content: Thought content normal.         Judgment: Judgment normal.       Administrative Statements

## 2024-06-20 NOTE — ASSESSMENT & PLAN NOTE
BP Readings from Last 3 Encounters:   06/20/24 146/76   06/14/24 116/76   05/17/24 122/74     Above goal at today's OV. Currently not on medication.   -recommend eating a low salt diet (such as the DASH diet), limiting alcohol, exercising, and wt loss.  - consider starting medication at next OV if BP remains elevated.

## 2024-07-22 DIAGNOSIS — E03.9 ACQUIRED HYPOTHYROIDISM: ICD-10-CM

## 2024-07-22 DIAGNOSIS — N52.8 OTHER MALE ERECTILE DYSFUNCTION: ICD-10-CM

## 2024-07-22 RX ORDER — TADALAFIL 10 MG/1
10 TABLET ORAL DAILY PRN
Qty: 10 TABLET | Refills: 0 | Status: SHIPPED | OUTPATIENT
Start: 2024-07-22

## 2024-07-22 RX ORDER — LEVOTHYROXINE SODIUM 0.05 MG/1
50 TABLET ORAL DAILY
Qty: 90 TABLET | Refills: 0 | Status: SHIPPED | OUTPATIENT
Start: 2024-07-22

## 2024-09-03 DIAGNOSIS — N52.8 OTHER MALE ERECTILE DYSFUNCTION: ICD-10-CM

## 2024-09-03 DIAGNOSIS — E03.9 ACQUIRED HYPOTHYROIDISM: ICD-10-CM

## 2024-09-03 DIAGNOSIS — E53.8 B12 DEFICIENCY: ICD-10-CM

## 2024-09-04 RX ORDER — TADALAFIL 10 MG/1
10 TABLET ORAL DAILY PRN
Qty: 10 TABLET | Refills: 0 | Status: SHIPPED | OUTPATIENT
Start: 2024-09-04

## 2024-09-04 RX ORDER — LEVOTHYROXINE SODIUM 50 UG/1
50 TABLET ORAL DAILY
Qty: 90 TABLET | Refills: 0 | Status: SHIPPED | OUTPATIENT
Start: 2024-09-04

## 2024-09-04 RX ORDER — CYANOCOBALAMIN (VITAMIN B-12) 500 MCG
1000 TABLET ORAL
Qty: 10 TABLET | Refills: 0 | Status: SHIPPED | OUTPATIENT
Start: 2024-09-04 | End: 2024-10-03

## 2024-10-16 DIAGNOSIS — N52.8 OTHER MALE ERECTILE DYSFUNCTION: ICD-10-CM

## 2024-10-16 DIAGNOSIS — E03.9 ACQUIRED HYPOTHYROIDISM: ICD-10-CM

## 2024-10-16 DIAGNOSIS — E53.8 B12 DEFICIENCY: ICD-10-CM

## 2024-10-16 RX ORDER — LEVOTHYROXINE SODIUM 50 UG/1
50 TABLET ORAL DAILY
Qty: 90 TABLET | Refills: 0 | Status: SHIPPED | OUTPATIENT
Start: 2024-10-16

## 2024-10-16 RX ORDER — CYANOCOBALAMIN (VITAMIN B-12) 500 MCG
1000 TABLET ORAL
Qty: 10 TABLET | Refills: 0 | Status: SHIPPED | OUTPATIENT
Start: 2024-10-16 | End: 2024-11-14

## 2024-10-16 RX ORDER — TADALAFIL 10 MG/1
10 TABLET ORAL DAILY PRN
Qty: 10 TABLET | Refills: 0 | Status: SHIPPED | OUTPATIENT
Start: 2024-10-16

## 2024-11-29 DIAGNOSIS — N52.8 OTHER MALE ERECTILE DYSFUNCTION: ICD-10-CM

## 2024-11-29 RX ORDER — TADALAFIL 10 MG/1
10 TABLET ORAL DAILY PRN
Qty: 10 TABLET | Refills: 0 | Status: SHIPPED | OUTPATIENT
Start: 2024-11-29

## 2025-02-04 DIAGNOSIS — N52.8 OTHER MALE ERECTILE DYSFUNCTION: ICD-10-CM

## 2025-02-05 RX ORDER — TADALAFIL 10 MG/1
10 TABLET ORAL DAILY PRN
Qty: 10 TABLET | Refills: 0 | Status: SHIPPED | OUTPATIENT
Start: 2025-02-05

## 2025-02-17 ENCOUNTER — OFFICE VISIT (OUTPATIENT)
Dept: FAMILY MEDICINE CLINIC | Facility: CLINIC | Age: 60
End: 2025-02-17

## 2025-02-17 VITALS
BODY MASS INDEX: 30.62 KG/M2 | SYSTOLIC BLOOD PRESSURE: 140 MMHG | RESPIRATION RATE: 14 BRPM | OXYGEN SATURATION: 97 % | TEMPERATURE: 97.9 F | WEIGHT: 202 LBS | DIASTOLIC BLOOD PRESSURE: 90 MMHG | HEART RATE: 67 BPM | HEIGHT: 68 IN

## 2025-02-17 DIAGNOSIS — R06.81 GERD WITH APNEA: Primary | ICD-10-CM

## 2025-02-17 DIAGNOSIS — G89.29 CHRONIC BILATERAL LOW BACK PAIN WITH BILATERAL SCIATICA: ICD-10-CM

## 2025-02-17 DIAGNOSIS — M54.41 CHRONIC BILATERAL LOW BACK PAIN WITH BILATERAL SCIATICA: ICD-10-CM

## 2025-02-17 DIAGNOSIS — M54.42 CHRONIC BILATERAL LOW BACK PAIN WITH BILATERAL SCIATICA: ICD-10-CM

## 2025-02-17 DIAGNOSIS — Z23 ENCOUNTER FOR IMMUNIZATION: ICD-10-CM

## 2025-02-17 DIAGNOSIS — K21.9 GERD WITH APNEA: Primary | ICD-10-CM

## 2025-02-17 PROCEDURE — 90715 TDAP VACCINE 7 YRS/> IM: CPT

## 2025-02-17 PROCEDURE — 90471 IMMUNIZATION ADMIN: CPT

## 2025-02-17 PROCEDURE — 99214 OFFICE O/P EST MOD 30 MIN: CPT

## 2025-02-17 RX ORDER — GABAPENTIN 300 MG/1
300 CAPSULE ORAL
Qty: 90 CAPSULE | Refills: 0 | Status: SHIPPED | OUTPATIENT
Start: 2025-02-17 | End: 2026-02-12

## 2025-02-17 RX ORDER — FAMOTIDINE 20 MG/1
20 TABLET, FILM COATED ORAL
Qty: 90 TABLET | Refills: 1 | Status: SHIPPED | OUTPATIENT
Start: 2025-02-17

## 2025-02-17 NOTE — ASSESSMENT & PLAN NOTE
Orders:    famotidine (PEPCID) 20 mg tablet; Take 1 tablet (20 mg total) by mouth daily at bedtime

## 2025-02-17 NOTE — PROGRESS NOTES
"Name: Perico Roy      : 1965      MRN: [id number]  Encounter Provider: UBALDO Glass  Encounter Date: 2025   Encounter department: [facility]  :  Assessment & Plan  GERD with apnea    Orders:    famotidine (PEPCID) 20 mg tablet; Take 1 tablet (20 mg total) by mouth daily at bedtime    Chronic bilateral low back pain with bilateral sciatica    Orders:    gabapentin (NEURONTIN) 300 mg capsule; Take 1 capsule (300 mg total) by mouth daily at bedtime    Ambulatory Referral to Chiropractic; Future    Encounter for immunization    Orders:    TDAP VACCINE GREATER THAN OR EQUAL TO 6YO IM           History of Present Illness   Here today for a chronic back pain follow-up. He received a back injection about 8 months ago which was effective for about 6 months. He does not want to go back because receiving the injection was a very traumatic experience for him. He also reports that his GERD is uncontrolled. He takes TUMS as needed during the day which is effective but he has bad GERD at night.       Review of Systems  As per HPI      Objective   /90 (BP Location: Left arm, Patient Position: Sitting, Cuff Size: Standard)   Pulse 67   Temp 97.9 °F (36.6 °C) (Temporal)   Resp 14   Ht 5' 8\" (1.727 m)   Wt 91.6 kg (202 lb)   SpO2 97%   BMI 30.71 kg/m²      Physical Exam  Vitals and nursing note reviewed.   Constitutional:       General: He is not in acute distress.     Appearance: He is well-developed. He is obese.   HENT:      Head: Normocephalic and atraumatic.      Right Ear: External ear normal.      Left Ear: External ear normal.      Nose: Nose normal.   Eyes:      Conjunctiva/sclera: Conjunctivae normal.   Cardiovascular:      Rate and Rhythm: Regular rhythm.      Pulses: Normal pulses.      Heart sounds: Normal heart sounds. No murmur heard.  Pulmonary:      Effort: Pulmonary effort is normal. No respiratory distress.      Breath sounds: Normal " breath sounds.   Abdominal:      Palpations: Abdomen is soft.      Tenderness: There is no abdominal tenderness.   Musculoskeletal:         General: No swelling. Normal range of motion.      Cervical back: Normal range of motion and neck supple.   Skin:     General: Skin is warm and dry.      Capillary Refill: Capillary refill takes less than 2 seconds.   Neurological:      General: No focal deficit present.      Mental Status: He is alert and oriented to person, place, and time. Mental status is at baseline.   Psychiatric:         Mood and Affect: Mood normal.         Behavior: Behavior normal.         Thought Content: Thought content normal.         Judgment: Judgment normal.

## 2025-02-17 NOTE — ASSESSMENT & PLAN NOTE
Orders:    gabapentin (NEURONTIN) 300 mg capsule; Take 1 capsule (300 mg total) by mouth daily at bedtime    Ambulatory Referral to Chiropractic; Future

## 2025-03-11 DIAGNOSIS — N52.8 OTHER MALE ERECTILE DYSFUNCTION: ICD-10-CM

## 2025-03-12 RX ORDER — TADALAFIL 10 MG/1
10 TABLET ORAL DAILY PRN
Qty: 10 TABLET | Refills: 0 | Status: SHIPPED | OUTPATIENT
Start: 2025-03-12

## 2025-04-06 DIAGNOSIS — N52.8 OTHER MALE ERECTILE DYSFUNCTION: ICD-10-CM

## 2025-04-06 DIAGNOSIS — E53.8 B12 DEFICIENCY: ICD-10-CM

## 2025-04-07 RX ORDER — CYANOCOBALAMIN (VITAMIN B-12) 500 MCG
1000 TABLET ORAL
Qty: 10 TABLET | Refills: 0 | OUTPATIENT
Start: 2025-04-07 | End: 2025-05-06

## 2025-04-07 RX ORDER — TADALAFIL 10 MG/1
10 TABLET ORAL DAILY PRN
Qty: 20 TABLET | Refills: 0 | Status: SHIPPED | OUTPATIENT
Start: 2025-04-07

## 2025-05-26 DIAGNOSIS — K21.9 GERD WITH APNEA: ICD-10-CM

## 2025-05-26 DIAGNOSIS — N52.8 OTHER MALE ERECTILE DYSFUNCTION: ICD-10-CM

## 2025-05-26 DIAGNOSIS — R06.81 GERD WITH APNEA: ICD-10-CM

## 2025-05-27 RX ORDER — FAMOTIDINE 20 MG/1
20 TABLET, FILM COATED ORAL
Qty: 90 TABLET | Refills: 0 | Status: SHIPPED | OUTPATIENT
Start: 2025-05-27

## 2025-05-27 RX ORDER — TADALAFIL 10 MG/1
10 TABLET ORAL DAILY PRN
Qty: 20 TABLET | Refills: 0 | Status: SHIPPED | OUTPATIENT
Start: 2025-05-27

## 2025-07-23 DIAGNOSIS — N52.8 OTHER MALE ERECTILE DYSFUNCTION: ICD-10-CM

## 2025-07-24 RX ORDER — TADALAFIL 10 MG/1
10 TABLET ORAL DAILY PRN
Qty: 20 TABLET | Refills: 0 | Status: SHIPPED | OUTPATIENT
Start: 2025-07-24